# Patient Record
Sex: MALE | NOT HISPANIC OR LATINO | Employment: FULL TIME | URBAN - METROPOLITAN AREA
[De-identification: names, ages, dates, MRNs, and addresses within clinical notes are randomized per-mention and may not be internally consistent; named-entity substitution may affect disease eponyms.]

---

## 2017-02-05 DIAGNOSIS — E03.4 HYPOTHYROIDISM DUE TO ACQUIRED ATROPHY OF THYROID: Primary | ICD-10-CM

## 2017-02-06 NOTE — TELEPHONE ENCOUNTER
Levothyroxine     Last Written Prescription Date: 11/15/16  Last Quantity: 90, # refills: 1  Last Office Visit with G, P or [facility] prescribing provider: 11/15/16        TSH   Date Value Ref Range Status   11/15/2016 5.18* 0.40 - 4.00 mU/L Final       Sandy Mcbride CMA

## 2017-02-07 RX ORDER — LEVOTHYROXINE SODIUM 112 UG/1
TABLET ORAL
Qty: 90 TABLET | Refills: 1 | OUTPATIENT
Start: 2017-02-07

## 2017-02-07 NOTE — TELEPHONE ENCOUNTER
Routing refill request to provider for review/approval because:  Labs out of range:      Isa Greenwood RN  [facility]  [phone]

## 2017-02-07 NOTE — TELEPHONE ENCOUNTER
Levothyroxine     Last Written Prescription Date: 11/15/16  Last Quantity: 90, # refills: 1      Why does he need more medication ? Let me know.    Demetrio Lawson MD  [facility], [place]

## 2017-03-20 ENCOUNTER — ONCOLOGY VISIT (OUTPATIENT)
Dept: ONCOLOGY | Facility: CLINIC | Age: 67
End: 2017-03-20
Attending: INTERNAL MEDICINE
Payer: COMMERCIAL

## 2017-03-20 ENCOUNTER — APPOINTMENT (OUTPATIENT)
Dept: LAB | Facility: CLINIC | Age: 67
End: 2017-03-20
Attending: INTERNAL MEDICINE
Payer: COMMERCIAL

## 2017-03-20 VITALS
BODY MASS INDEX: 29.12 KG/M2 | SYSTOLIC BLOOD PRESSURE: 178 MMHG | OXYGEN SATURATION: 97 % | WEIGHT: 214.7 LBS | HEART RATE: 70 BPM | TEMPERATURE: 97.8 F | DIASTOLIC BLOOD PRESSURE: 99 MMHG | RESPIRATION RATE: 16 BRPM

## 2017-03-20 DIAGNOSIS — C83.07 SPLENIC MARGINAL ZONE B-CELL LYMPHOMA (H): ICD-10-CM

## 2017-03-20 LAB
ALBUMIN SERPL-MCNC: 3.8 G/DL (ref 3.4–5)
ALP SERPL-CCNC: 53 U/L (ref 40–150)
ALT SERPL W P-5'-P-CCNC: 23 U/L (ref 0–70)
ANION GAP SERPL CALCULATED.3IONS-SCNC: 6 MMOL/L (ref 3–14)
AST SERPL W P-5'-P-CCNC: 15 U/L (ref 0–45)
BASOPHILS # BLD AUTO: 0 10E9/L (ref 0–0.2)
BASOPHILS NFR BLD AUTO: 0.5 %
BILIRUB SERPL-MCNC: 0.6 MG/DL (ref 0.2–1.3)
BUN SERPL-MCNC: 14 MG/DL (ref 7–30)
CALCIUM SERPL-MCNC: 8.4 MG/DL (ref 8.5–10.1)
CHLORIDE SERPL-SCNC: 109 MMOL/L (ref 94–109)
CO2 SERPL-SCNC: 28 MMOL/L (ref 20–32)
CREAT SERPL-MCNC: 0.86 MG/DL (ref 0.66–1.25)
DIFFERENTIAL METHOD BLD: ABNORMAL
EOSINOPHIL # BLD AUTO: 0.1 10E9/L (ref 0–0.7)
EOSINOPHIL NFR BLD AUTO: 1.6 %
ERYTHROCYTE [DISTWIDTH] IN BLOOD BY AUTOMATED COUNT: 13 % (ref 10–15)
GFR SERPL CREATININE-BSD FRML MDRD: 89 ML/MIN/1.7M2
GLUCOSE SERPL-MCNC: 115 MG/DL (ref 70–99)
HCT VFR BLD AUTO: 43.6 % (ref 40–53)
HGB BLD-MCNC: 14.6 G/DL (ref 13.3–17.7)
IMM GRANULOCYTES # BLD: 0 10E9/L (ref 0–0.4)
IMM GRANULOCYTES NFR BLD: 0.2 %
LDH SERPL L TO P-CCNC: 192 U/L (ref 85–227)
LYMPHOCYTES # BLD AUTO: 1.3 10E9/L (ref 0.8–5.3)
LYMPHOCYTES NFR BLD AUTO: 21.8 %
MCH RBC QN AUTO: 33.7 PG (ref 26.5–33)
MCHC RBC AUTO-ENTMCNC: 33.5 G/DL (ref 31.5–36.5)
MCV RBC AUTO: 101 FL (ref 78–100)
MONOCYTES # BLD AUTO: 0.6 10E9/L (ref 0–1.3)
MONOCYTES NFR BLD AUTO: 9.9 %
NEUTROPHILS # BLD AUTO: 3.8 10E9/L (ref 1.6–8.3)
NEUTROPHILS NFR BLD AUTO: 66 %
NRBC # BLD AUTO: 0 10*3/UL
NRBC BLD AUTO-RTO: 0 /100
PLATELET # BLD AUTO: 246 10E9/L (ref 150–450)
POTASSIUM SERPL-SCNC: 4.6 MMOL/L (ref 3.4–5.3)
PROT SERPL-MCNC: 7.1 G/DL (ref 6.8–8.8)
RBC # BLD AUTO: 4.33 10E12/L (ref 4.4–5.9)
SODIUM SERPL-SCNC: 143 MMOL/L (ref 133–144)
WBC # BLD AUTO: 5.7 10E9/L (ref 4–11)

## 2017-03-20 PROCEDURE — 80053 COMPREHEN METABOLIC PANEL: CPT | Performed by: INTERNAL MEDICINE

## 2017-03-20 PROCEDURE — 99213 OFFICE O/P EST LOW 20 MIN: CPT | Mod: ZP | Performed by: INTERNAL MEDICINE

## 2017-03-20 PROCEDURE — 36415 COLL VENOUS BLD VENIPUNCTURE: CPT

## 2017-03-20 PROCEDURE — 99212 OFFICE O/P EST SF 10 MIN: CPT | Mod: ZF

## 2017-03-20 PROCEDURE — 83615 LACTATE (LD) (LDH) ENZYME: CPT | Performed by: INTERNAL MEDICINE

## 2017-03-20 PROCEDURE — 85025 COMPLETE CBC W/AUTO DIFF WBC: CPT | Performed by: INTERNAL MEDICINE

## 2017-03-20 NOTE — MR AVS SNAPSHOT
After Visit Summary   3/20/2017    Moose Serrano    MRN: [id number]           Patient Information     Date Of Birth          1950        Visit Information        Provider Department      3/20/2017 8:30 AM Carlos Beebe MD [facility]        Today's Diagnoses     Splenic marginal zone b-cell lymphoma (H)           Follow-ups after your visit        Your next 10 appointments already scheduled     Sep 18, 2017  8:00 AM CDT   [facility] with  [facility]   [facility] ([facility])    [address] [place] [id number] [phone]            Sep 18, 2017  8:30 AM CDT   (Arrive by 8:15 AM)   Return Visit with Carlos Beebe MD   [facility] ([facility])    [address] [place] [address] [id number] [phone]              Future tests that were ordered for you today     Open Future Orders        Priority Expected Expires Ordered    CBC with platelets differential Routine 9/21/2017 3/21/2018 3/21/2017    Comprehensive metabolic panel Routine 9/21/2017 3/21/2018 3/21/2017    Lactate Dehydrogenase Routine 9/21/2017 3/21/2018 3/21/2017            Who to contact     If you have questions or need follow up information about today's clinic visit or your schedule please contact [facility] directly at [phone].  Normal or non-critical lab and imaging results will be communicated to you by MyChart, letter or phone within 4 business days after the clinic has received the results. If you do not hear from us within 7 days, please contact the clinic through MyChart or phone. If you have a critical or abnormal lab result, we will notify you by phone as soon as possible.  Submit refill requests through Eved or call your pharmacy and they will forward the refill request to us. Please allow 3 business days for your  "refill to be completed.          Additional Information About Your Visit        Nugg-it Information     Nugg-it lets you send messages to your doctor, view your test results, renew your prescriptions, schedule appointments and more. To sign up, go to [email or web address] . Click on \"Log in\" on the left side of the screen, which will take you to the Welcome page. Then click on \"Sign up Now\" on the right side of the page.     You will be asked to enter the access code listed below, as well as some personal information. Please follow the directions to create your username and password.     Your access code is: [email or web address]  Expires: 2017  7:30 AM     Your access code will  in 90 days. If you need help or a new code, please call your [facility] clinic or [phone].        Care EveryWhere ID     This is your [facility] EveryWhere ID. This could be used by other organizations to access your [facility] medical records  [id number]        Your Vitals Were     Pulse Temperature Respirations Pulse Oximetry BMI (Body Mass Index)       70 97.8  F (36.6  C) (Oral) 16 97% 29.12 kg/m2        Blood Pressure from Last 3 Encounters:   17 (!) 178/99   11/15/16 128/88   16 (!) 179/97    Weight from Last 3 Encounters:   17 97.4 kg (214 lb 11.2 oz)   11/15/16 96.2 kg (212 lb)   16 99.8 kg (220 lb 0.3 oz)              We Performed the Following     CBC with platelets differential     Comprehensive metabolic panel     Lactate Dehydrogenase        Primary Care Provider    None       No address on file        Thank you!     Thank you for choosing [facility]  for your care. Our goal is always to provide you with excellent care. Hearing back from our patients is one way we can continue to improve our services. Please take a few minutes to complete the written survey that you may receive in the mail after your visit with us. Thank you!             Your Updated Medication List - Protect others " around you: Learn how to safely use, store and throw away your medicines at www.disposemymeds.org.          This list is accurate as of: 3/20/17 11:59 PM.  Always use your most recent med list.                   Brand Name Dispense Instructions for use    azithromycin 250 MG tablet    ZITHROMAX    6 tablet    Two tablets first day, then one tablet daily for four days.       levothyroxine 112 MCG tablet    SYNTHROID/LEVOTHROID    90 tablet    Take 1 tablet (112 mcg) by mouth daily       Multi-vitamin Tabs tablet      Take 1 tablet by mouth daily

## 2017-03-20 NOTE — NURSING NOTE
Chief Complaint   Patient presents with     Blood Draw     venipuncture     Vitals done and labs drawn, see flow sheets.  OLIVER BEAN, CMA

## 2017-03-20 NOTE — NURSING NOTE
Moose Serrano is a 66 year old male who presents for:  Chief Complaint   Patient presents with     Blood Draw     venipuncture     Oncology Clinic Visit     Splenic marginal zone b-cell lymphoma (H)        Initial Vitals:  BP (!) 178/99  Pulse 70  Temp 97.8  F (36.6  C) (Oral)  Resp 16  Wt 97.4 kg (214 lb 11.2 oz)  SpO2 97%  BMI 29.12 kg/m2 Estimated body mass index is 29.12 kg/(m^2) as calculated from the following:    Height as of 11/15/16: 1.829 m (6').    Weight as of this encounter: 97.4 kg (214 lb 11.2 oz).. Body surface area is 2.22 meters squared. BP completed using cuff size: regular  Data Unavailable No LMP for male patient. Allergies and medications reviewed.     Medications: Medication refills not needed today.  Pharmacy name entered into EPIC:    [facility] [place] [address] [facility] [facility] - [place], [place] - [address]    Comments:     6 minutes for nursing intake (face to face time)   Arlyn Rice CMA

## 2017-03-20 NOTE — PROGRESS NOTES
REASON FOR VISIT:  Follow up splenic marginal zone lymphoma.       HISTORY OF PRESENT ILLNESS:  Mr. 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   To summarize his course, he was noted to have progressive leukocytosis on routine blood work in mid 2013.  PET/CT revealed splenomegaly and lymphadenopathy in the axilla, pretracheal area, and portacaval area.  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  He was initially monitored without treatment.  After developing abdominal pain, weight loss, and night sweats he was started on rituximab in 7/2014.  He had difficulty tolerating rituximab due to severe infusion reaction, so he was treated with CVP alone and then rituximab was successfully added.  He completed 6 cycles of treatment at the end of 10/2014.  He is here for ongoing follow up.    Since his last visit, there have been no new issues.  He continues to feel well and remains very busy with his work.  Currently he is in [facility] for work.  He has had no fevers, chills or night sweats.  Appetite is decent.  Weight is stable.  He has no chest pain, difficulty with breathing or cough.  He has normal bowel function.  He has no urinary complaints.  He has no bleeding, bruising or skin rashes.  He has no headache, vision changes, focal weakness or sensory changes.  Overall, he is managing well.      REVIEW OF SYSTEMS:  A complete 10-point review of systems was negative other than noted.    MEDICATIONS:  Current Outpatient Prescriptions   Medication     azithromycin (ZITHROMAX) 250 MG tablet     levothyroxine (SYNTHROID, LEVOTHROID) 112 MCG tablet     multivitamin, therapeutic with minerals (MULTI-VITAMIN) TABS     [DISCONTINUED] NO ACTIVE MEDICATIONS     No current facility-administered medications for this visit.      PHYSICAL EXAMINATION:  BP (!)  178/99  Pulse 70  Temp 97.8  F (36.6  C) (Oral)  Resp 16  Wt 97.4 kg (214 lb 11.2 oz)  SpO2 97%  BMI 29.12 kg/m2  Wt Readings from Last 5 Encounters:   03/20/17 97.4 kg (214 lb 11.2 oz)   11/15/16 96.2 kg (212 lb)   08/29/16 99.8 kg (220 lb 0.3 oz)   05/16/16 99.5 kg (219 lb 4.8 oz)   02/15/16 101.1 kg (222 lb 14.4 oz)     General: Well appearing white male. No acute distress.  HEENT: Sclerae anicteric. No OP lesions, masses, or tonsilar enlargement.  Lungs: Clear bilaterally without wheezing or crackles.  Heart: Regular rate and rhythm without murmurs.  Abd: Bowel sounds present, no tenderness to palpation, spleen tip not palpable.  Extremities: No lower extremity edema.  Lymph:  No cervical, clavicular, axillary, epitrochlear, or inguinal lymphadenopathy.  Neuro: Grossly non-focal.  Performance status: ECOG 0  Skin:  Soft mobile subq lesions on R clavicle and forehead stable    LABORATORY DATA:  Recent Labs   Lab Test  03/20/17   0803  08/29/16   1018  05/16/16   0752   02/24/14   0826   11/25/13   1009  07/24/13   0755   WBC  5.7  5.9  5.0   < >  13.0*   < >  26.7*  15.9*   HGB  14.6  14.0  14.6   < >  12.7*   < >  14.2  13.9   PLT  246  229  279   < >  212   < >  245  227   ANEU  3.8  3.9  3.0   < >  4.3   < >  6.9  5.9   ALYM  1.3  1.3  1.1   < >  7.0*   < >  17.7*  7.5*   RETICABSCT   --    --    --    --   104.4*   --   80.0  59.2    < > = values in this interval not displayed.     Recent Labs   Lab Test  03/20/17   0803  08/29/16   1018  05/16/16   0752   05/18/15   0711   11/17/14   1204  10/27/14   1037   07/18/14   0906   NA  143  142  140   < >  140   < >  141  139   < >  142   POTASSIUM  4.6  4.6  4.8   < >  4.9   < >  4.0  4.3   < >  4.5   CHLORIDE  109  108  105   < >  106   < >  107  107   < >  102   CO2  28  28  29   < >  27   < >  24  26   < >  29   BUN  14  13  10   < >  13   < >  8  13   < >  16   CR  0.86  1.00  1.07   < >  0.89   < >  1.05  0.94   < >  0.89   RACHEAL  8.4*  8.6  8.5   < >  8.6   <  >  9.1  9.5   < >  9.0   PHOS   --    --    --    --    --    --    --    --    --   2.9   URIC   --    --    --    --   6.1   --   6.3  5.8   < >  5.0   LDH  192  172  202   < >  194   < >  183  218   < >  464    < > = values in this interval not displayed.     Recent Labs   Lab Test  03/20/17   0803  08/29/16   1018  05/16/16   0752   AST  15  9  22   ALT  23  21  34   ALKPHOS  53  57  68   BILITOTAL  0.6  0.6  0.7     IMPRESSION AND PLAN:  Mr. 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      In regards to his lymphoma, he clinically remains in remission.  Blood counts look great today.  We discussed plans for ongoing monitoring at 6-month intervals and the importance that he let us know if there is anything that comes up in between visits.       He has no active infectious issues.  He is up-to-date for pneumococcal and influenza vaccinations.  He will be due for a repeat Pneumovax in 2020.       He is now working with Dr. Lawson for his routine medical issues.  Notably, he continues to be hypertensive when he is at our clinic and relates it to stress of visit (and need to rush to the airport immediately after our visit).  Indeed, at his last visit in his primary care clinic his blood pressure was normal and so we will not make any changes, but will send this note to Dr. Lawson.       I plan to see him back in 6 months with labs.  I reminded him call in the interim if questions, concerns or new issues arise.     Carlos Beebe MD, PhD   of Medicine  Division of Hematology, Oncology, and Transplantation  [email or web address] email  [phone] office  [phone] pager

## 2017-05-03 DIAGNOSIS — E03.4 HYPOTHYROIDISM DUE TO ACQUIRED ATROPHY OF THYROID: ICD-10-CM

## 2017-05-03 NOTE — TELEPHONE ENCOUNTER
Routing refill request to provider for review/approval because:  Labs out of range:  tsh    Isa Greenwood,RN  [facility]  [phone]

## 2017-05-03 NOTE — TELEPHONE ENCOUNTER
levothyroxine (SYNTHROID/LEVOTHROID) 112 MCG tablet     Last Written Prescription Date: 11/15/2016  Last Quantity: 90, # refills: 1  Last Office Visit with [facility], [facility] or [facility] prescribing provider: 11/15/2016        TSH   Date Value Ref Range Status   11/15/2016 5.18 (H) 0.40 - 4.00 mU/L Final

## 2017-05-04 RX ORDER — LEVOTHYROXINE SODIUM 112 UG/1
TABLET ORAL
Qty: 90 TABLET | Refills: 0 | Status: SHIPPED | OUTPATIENT
Start: 2017-05-04 | End: 2017-06-20

## 2017-05-04 NOTE — TELEPHONE ENCOUNTER
"TC called and patient is in [place], should be back Monday  Will call to schedule a \"Lab Only\" appointment to chest TSH  Antonia Perdomo TC  "

## 2017-05-04 NOTE — TELEPHONE ENCOUNTER
Refill ordered.   Future TSH lab ordered. Notify patient to get it checked soon.    Demetrio Lawson MD  [facility], [place]

## 2017-06-14 DIAGNOSIS — E03.4 HYPOTHYROIDISM DUE TO ACQUIRED ATROPHY OF THYROID: ICD-10-CM

## 2017-06-14 LAB — TSH SERPL DL<=0.05 MIU/L-ACNC: 2.53 MU/L (ref 0.4–4)

## 2017-06-14 PROCEDURE — 36415 COLL VENOUS BLD VENIPUNCTURE: CPT | Performed by: FAMILY MEDICINE

## 2017-06-14 PROCEDURE — 84443 ASSAY THYROID STIM HORMONE: CPT | Performed by: FAMILY MEDICINE

## 2017-06-20 ENCOUNTER — OFFICE VISIT (OUTPATIENT)
Dept: FAMILY MEDICINE | Facility: CLINIC | Age: 67
End: 2017-06-20
Payer: COMMERCIAL

## 2017-06-20 VITALS
HEIGHT: 72 IN | SYSTOLIC BLOOD PRESSURE: 143 MMHG | DIASTOLIC BLOOD PRESSURE: 83 MMHG | TEMPERATURE: 98.4 F | OXYGEN SATURATION: 96 % | BODY MASS INDEX: 29.53 KG/M2 | WEIGHT: 218 LBS | HEART RATE: 92 BPM

## 2017-06-20 DIAGNOSIS — E03.4 HYPOTHYROIDISM DUE TO ACQUIRED ATROPHY OF THYROID: Primary | ICD-10-CM

## 2017-06-20 DIAGNOSIS — I10 HTN, GOAL BELOW 140/90: ICD-10-CM

## 2017-06-20 PROCEDURE — 99214 OFFICE O/P EST MOD 30 MIN: CPT | Performed by: FAMILY MEDICINE

## 2017-06-20 RX ORDER — AMLODIPINE BESYLATE 5 MG/1
5 TABLET ORAL DAILY
Qty: 30 TABLET | Refills: 3 | Status: SHIPPED | OUTPATIENT
Start: 2017-06-20 | End: 2017-08-01

## 2017-06-20 RX ORDER — LEVOTHYROXINE SODIUM 112 UG/1
TABLET ORAL
Qty: 90 TABLET | Refills: 3 | Status: SHIPPED | OUTPATIENT
Start: 2017-06-20 | End: 2018-08-03

## 2017-06-20 NOTE — PROGRESS NOTES
SUBJECTIVE:                                                    Moose Serrano is a 66 year old male who presents to clinic today for the following health issues:      Hypothyroidism Follow-up      Since last visit, patient describes the following symptoms: Weight stable, no hair loss, no skin changes, no constipation, no loose stools       Amount of exercise or physical activity: 6-7 days/week for an average of greater than 60 minutes    Problems taking medications regularly: No    Medication side effects: Joint pain from generic doses     Diet: regular (no restrictions)          Patient is noted to have elevated blood pressure today. Patient reports that he has had elevated blood pressure for a long time off and on. Never had been symptomatic therefore never started a medication. Denies any chest pain or pressure. Denies any shortness of breath or palpitations or leg swelling. Denies headaches or visual changes.    Problem list and histories reviewed & adjusted, as indicated.  Additional history: as documented    Patient Active Problem List   Diagnosis     CARDIOVASCULAR SCREENING; LDL GOAL LESS THAN 160     Splenic marginal zone b-cell lymphoma (H)     Hypothyroidism due to acquired atrophy of thyroid     HTN, goal below 140/90     Past Surgical History:   Procedure Laterality Date     COLONOSCOPY  7/30/2013    Procedure: COLONOSCOPY;  Colonoscopy;  Surgeon: Dieudonne Harden MD;  Location:  GI     FINGER SURGERY  1996     right knee surgery  Approx 2002     VASECTOMY       WRIST SURGERY  1996       Social History   Substance Use Topics     Smoking status: Former Smoker     Packs/day: 0.50     Years: 34.00     Types: Cigarettes     Smokeless tobacco: Current User     Types: Chew      Comment: 1 can every 3-4 weeks     Alcohol use Yes      Comment: 2 beers per day     Family History   Problem Relation Age of Onset     Unknown/Adopted No family hx of      Asthma No family hx of      C.A.D. No family hx of       DIABETES No family hx of      Hypertension No family hx of      CEREBROVASCULAR DISEASE No family hx of      Breast Cancer No family hx of      Cancer - colorectal No family hx of      Prostate Cancer No family hx of      Alcohol/Drug No family hx of      Allergies No family hx of      Alzheimer Disease No family hx of      Anesthesia Reaction No family hx of      Arthritis No family hx of      Blood Disease No family hx of      CANCER No family hx of      Cardiovascular No family hx of      Circulatory No family hx of      Congenital Anomalies No family hx of      Connective Tissue Disorder No family hx of      Depression No family hx of      Endocrine Disease No family hx of      Eye Disorder No family hx of      Genetic Disorder No family hx of      GASTROINTESTINAL DISEASE No family hx of      Genitourinary Problems No family hx of      Gynecology No family hx of      HEART DISEASE No family hx of      Lipids No family hx of      Musculoskeletal Disorder No family hx of      Neurologic Disorder No family hx of      Obesity No family hx of      OSTEOPOROSIS No family hx of      Psychotic Disorder No family hx of      Respiratory No family hx of      Thyroid Disease No family hx of      Family History Negative No family hx of      Hearing Loss No family hx of          Current Outpatient Prescriptions   Medication Sig Dispense Refill     levothyroxine (SYNTHROID/LEVOTHROID) 112 MCG tablet TAKE 1 TABLET (112 MCG) BY MOUTH DAILY 90 tablet 3     amLODIPine (NORVASC) 5 MG tablet Take 1 tablet (5 mg) by mouth daily 30 tablet 3     multivitamin, therapeutic with minerals (MULTI-VITAMIN) TABS Take 1 tablet by mouth daily       [DISCONTINUED] levothyroxine (SYNTHROID/LEVOTHROID) 112 MCG tablet TAKE 1 TABLET (112 MCG) BY MOUTH DAILY 90 tablet 0     [DISCONTINUED] NO ACTIVE MEDICATIONS        Allergies   Allergen Reactions     Penicillins Hives     Rituximab Other (See Comments)     Rigors, nausea       Reviewed and updated  as needed this visit by clinical staff       Reviewed and updated as needed this visit by Provider         ROS:  C: NEGATIVE for fever, chills, change in weight  E/M: NEGATIVE for ear, mouth and throat problems  R: NEGATIVE for significant cough or SOB  CV: NEGATIVE for chest pain, palpitations or peripheral edema    OBJECTIVE:                                                    /83  Pulse 92  Temp 98.4  F (36.9  C) (Tympanic)  Ht 6' (1.829 m)  Wt 218 lb (98.9 kg)  SpO2 96%  BMI 29.57 kg/m2  Body mass index is 29.57 kg/(m^2).   GENERAL: healthy, alert, well nourished, well hydrated, no distress  HENT: ear canals- normal; TMs- normal; Nose- normal; Mouth- no ulcers, no lesions  NECK: no tenderness, no adenopathy, no asymmetry, no masses, no stiffness; thyroid- normal to palpation  RESP: lungs clear to auscultation - no rales, no rhonchi, no wheezes  CV: regular rates and rhythm, normal S1 S2, no S3 or S4 and no murmur, no click or rub -  ABDOMEN: soft, no tenderness, no  hepatosplenomegaly, no masses, normal bowel sounds  MS: extremities- no gross deformities noted, no edema    TSH   Date Value Ref Range Status   06/14/2017 2.53 0.40 - 4.00 mU/L Final   ]       ASSESSMENT/PLAN:                                                      1. Hypothyroidism due to acquired atrophy of thyroid  Well-controlled. Refill on medication given. Resume current dose  - levothyroxine (SYNTHROID/LEVOTHROID) 112 MCG tablet; TAKE 1 TABLET (112 MCG) BY MOUTH DAILY  Dispense: 90 tablet; Refill: 3    2. HTN, goal below 140/90  New diagnosis. Recommended to get it treated as elevated blood pressure may lead to cardiac hypertrophy and failure. Patient is in agreement to starting a medication  Recommended to start amlodipine 5 mg daily. Side effect profile and mechanism of action was discussed. Follow-up in one month for recheck.  - amLODIPine (NORVASC) 5 MG tablet; Take 1 tablet (5 mg) by mouth daily  Dispense: 30 tablet; Refill:  3      Follow up with Provider - 1 month     Demetrio Lawson MD  [facility]

## 2017-06-20 NOTE — NURSING NOTE
Chief Complaint   Patient presents with     Thyroid Problem     follow up        Initial /83  Pulse 92  Temp 98.4  F (36.9  C) (Tympanic)  Ht 6' (1.829 m)  Wt 218 lb (98.9 kg)  SpO2 96%  BMI 29.57 kg/m2 Estimated body mass index is 29.57 kg/(m^2) as calculated from the following:    Height as of this encounter: 6' (1.829 m).    Weight as of this encounter: 218 lb (98.9 kg).  Medication Reconciliation: complete

## 2017-06-20 NOTE — MR AVS SNAPSHOT
After Visit Summary   6/20/2017    Moose Serrano    MRN: [id number]           Patient Information     Date Of Birth          1950        Visit Information        Provider Department      6/20/2017 10:00 AM Demetrio Lawson MD [facility] [place]        Today's Diagnoses     HTN, goal below 140/90    -  1    Hypothyroidism due to acquired atrophy of thyroid           Follow-ups after your visit        Follow-up notes from your care team     Return in about 4 weeks (around 7/18/2017) for Physical Exam, Lab Work, BP Recheck.      Your next 10 appointments already scheduled     Sep 18, 2017  8:00 AM CDT   Masonic Lab Draw with  MASONIC LAB DRAW   [facility] ([facility])    [address] [place] [id number] [phone]            Sep 18, 2017  8:30 AM CDT   (Arrive by 8:15 AM)   Return Visit with Carlos Beebe MD   [facility] ([facility])    [address] [place] [id number] [phone]              Who to contact     If you have questions or need follow up information about today's clinic visit or your schedule please contact [facility] [place] directly at [phone].  Normal or non-critical lab and imaging results will be communicated to you by [employer], letter or phone within 4 business days after the clinic has received the results. If you do not hear from us within 7 days, please contact the clinic through MyChart or phone. If you have a critical or abnormal lab result, we will notify you by phone as soon as possible.  Submit refill requests through [email or web address] or call your pharmacy and they will forward the refill request to us. Please allow 3 business days for your refill to be completed.          Additional Information About Your Visit        [email or web address] Information     [email or web address] lets you send messages to your doctor, view your test  "results, renew your prescriptions, schedule appointments and more. To sign up, go to [email or web address] . Click on \"Log in\" on the left side of the screen, which will take you to the Welcome page. Then click on \"Sign up Now\" on the right side of the page.     You will be asked to enter the access code listed below, as well as some personal information. Please follow the directions to create your username and password.     Your access code is: [email or web address]  Expires: 2017 10:24 AM     Your access code will  in 90 days. If you need help or a new code, please call your [place] clinic or [phone].        Care EveryWhere ID     This is your Care EveryWhere ID. This could be used by other organizations to access your [place] medical records  [id number]        Your Vitals Were     Pulse Temperature Height Pulse Oximetry BMI (Body Mass Index)       92 98.4  F (36.9  C) (Tympanic) 6' (1.829 m) 96% 29.57 kg/m2        Blood Pressure from Last 3 Encounters:   17 143/83   17 (!) 178/99   11/15/16 128/88    Weight from Last 3 Encounters:   17 218 lb (98.9 kg)   17 214 lb 11.2 oz (97.4 kg)   11/15/16 212 lb (96.2 kg)              Today, you had the following     No orders found for display         Today's Medication Changes          These changes are accurate as of: 17 10:24 AM.  If you have any questions, ask your nurse or doctor.               Start taking these medicines.        Dose/Directions    amLODIPine 5 MG tablet   Commonly known as:  NORVASC   Used for:  HTN, goal below 140/90   Started by:  Demetrio Lawson MD        Dose:  5 mg   Take 1 tablet (5 mg) by mouth daily   Quantity:  30 tablet   Refills:  3         These medicines have changed or have updated prescriptions.        Dose/Directions    levothyroxine 112 MCG tablet   Commonly known as:  SYNTHROID/LEVOTHROID   This may have changed:  See the new instructions.   Used for:  Hypothyroidism due to acquired atrophy of " thyroid   Changed by:  Demetrio Lawson MD        TAKE 1 TABLET (112 MCG) BY MOUTH DAILY   Quantity:  90 tablet   Refills:  3            Where to get your medicines      [place] [facility] [place] [address]  [address], [place] [address]     Phone:  [phone]     amLODIPine 5 MG tablet    levothyroxine 112 MCG tablet                Primary Care Provider    None       No address on file        Thank you!     Thank you for choosing [facility]  for your care. Our goal is always to provide you with excellent care. Hearing back from our patients is one way we can continue to improve our services. Please take a few minutes to complete the written survey that you may receive in the mail after your visit with us. Thank you!             Your Updated Medication List - Protect others around you: Learn how to safely use, store and throw away your medicines at www.disposemymeds.org.          This list is accurate as of: 6/20/17 10:24 AM.  Always use your most recent med list.                   Brand Name Dispense Instructions for use    amLODIPine 5 MG tablet    NORVASC    30 tablet    Take 1 tablet (5 mg) by mouth daily       levothyroxine 112 MCG tablet    SYNTHROID/LEVOTHROID    90 tablet    TAKE 1 TABLET (112 MCG) BY MOUTH DAILY       Multi-vitamin Tabs tablet      Take 1 tablet by mouth daily

## 2017-08-01 ENCOUNTER — OFFICE VISIT (OUTPATIENT)
Dept: FAMILY MEDICINE | Facility: CLINIC | Age: 67
End: 2017-08-01
Payer: COMMERCIAL

## 2017-08-01 VITALS
HEIGHT: 72 IN | WEIGHT: 218 LBS | DIASTOLIC BLOOD PRESSURE: 82 MMHG | SYSTOLIC BLOOD PRESSURE: 134 MMHG | OXYGEN SATURATION: 95 % | TEMPERATURE: 98 F | HEART RATE: 80 BPM | BODY MASS INDEX: 29.53 KG/M2

## 2017-08-01 DIAGNOSIS — I10 HTN, GOAL BELOW 140/90: ICD-10-CM

## 2017-08-01 PROCEDURE — 99213 OFFICE O/P EST LOW 20 MIN: CPT | Performed by: FAMILY MEDICINE

## 2017-08-01 RX ORDER — AMLODIPINE BESYLATE 5 MG/1
5 TABLET ORAL DAILY
Qty: 90 TABLET | Refills: 3 | Status: SHIPPED | OUTPATIENT
Start: 2017-08-01 | End: 2018-08-14

## 2017-08-01 NOTE — PROGRESS NOTES
SUBJECTIVE:                                                    Moose Serrano is a 66 year old male who presents to clinic today for the following health issues:      Hypertension Follow-up      Outpatient blood pressures are not being checked.    Low Salt Diet: no added salt        Amount of exercise or physical activity: None    Problems taking medications regularly: No    Medication side effects: none  Diet: regular (no restrictions)        Problem list and histories reviewed & adjusted, as indicated.  Additional history: as documented    Patient Active Problem List   Diagnosis     CARDIOVASCULAR SCREENING; LDL GOAL LESS THAN 160     Splenic marginal zone b-cell lymphoma (H)     Hypothyroidism due to acquired atrophy of thyroid     HTN, goal below 140/90     Past Surgical History:   Procedure Laterality Date     COLONOSCOPY  7/30/2013    Procedure: COLONOSCOPY;  Colonoscopy;  Surgeon: Dieudonne Harden MD;  Location:  GI     FINGER SURGERY  1996     right knee surgery  Approx 2002     VASECTOMY       WRIST SURGERY  1996       Social History   Substance Use Topics     Smoking status: Former Smoker     Packs/day: 0.50     Years: 34.00     Types: Cigarettes     Smokeless tobacco: Current User     Types: Chew      Comment: 1 can every 3-4 weeks     Alcohol use Yes      Comment: 2 beers per day     Family History   Problem Relation Age of Onset     Unknown/Adopted No family hx of      Asthma No family hx of      C.A.D. No family hx of      DIABETES No family hx of      Hypertension No family hx of      CEREBROVASCULAR DISEASE No family hx of      Breast Cancer No family hx of      Cancer - colorectal No family hx of      Prostate Cancer No family hx of      Alcohol/Drug No family hx of      Allergies No family hx of      Alzheimer Disease No family hx of      Anesthesia Reaction No family hx of      Arthritis No family hx of      Blood Disease No family hx of      CANCER No family hx of      Cardiovascular No family  hx of      Circulatory No family hx of      Congenital Anomalies No family hx of      Connective Tissue Disorder No family hx of      Depression No family hx of      Endocrine Disease No family hx of      Eye Disorder No family hx of      Genetic Disorder No family hx of      GASTROINTESTINAL DISEASE No family hx of      Genitourinary Problems No family hx of      Gynecology No family hx of      HEART DISEASE No family hx of      Lipids No family hx of      Musculoskeletal Disorder No family hx of      Neurologic Disorder No family hx of      Obesity No family hx of      OSTEOPOROSIS No family hx of      Psychotic Disorder No family hx of      Respiratory No family hx of      Thyroid Disease No family hx of      Family History Negative No family hx of      Hearing Loss No family hx of          Current Outpatient Prescriptions   Medication Sig Dispense Refill     amLODIPine (NORVASC) 5 MG tablet Take 1 tablet (5 mg) by mouth daily 90 tablet 3     levothyroxine (SYNTHROID/LEVOTHROID) 112 MCG tablet TAKE 1 TABLET (112 MCG) BY MOUTH DAILY 90 tablet 3     multivitamin, therapeutic with minerals (MULTI-VITAMIN) TABS Take 1 tablet by mouth daily       [DISCONTINUED] amLODIPine (NORVASC) 5 MG tablet Take 1 tablet (5 mg) by mouth daily 30 tablet 3     [DISCONTINUED] NO ACTIVE MEDICATIONS        Allergies   Allergen Reactions     Penicillins Hives     Rituximab Other (See Comments)     Rigors, nausea         Reviewed and updated as needed this visit by clinical staff     Reviewed and updated as needed this visit by Provider         ROS:  C: NEGATIVE for fever, chills, change in weight  E/M: NEGATIVE for ear, mouth and throat problems  R: NEGATIVE for significant cough or SOB  CV: NEGATIVE for chest pain, palpitations or peripheral edema    OBJECTIVE:                                                    /82 (BP Location: Right arm, Patient Position: Chair, Cuff Size: Adult Large)  Pulse 80  Temp 98  F (36.7  C)  (Tympanic)  Ht 6' (1.829 m)  Wt 218 lb (98.9 kg)  SpO2 95%  BMI 29.57 kg/m2  Body mass index is 29.57 kg/(m^2).   GENERAL: healthy, alert, well nourished, well hydrated, no distress  HENT: ear canals- normal; TMs- normal; Nose- normal; Mouth- no ulcers, no lesions  NECK: no tenderness, no adenopathy, no asymmetry, no masses, no stiffness; thyroid- normal to palpation  RESP: lungs clear to auscultation - no rales, no rhonchi, no wheezes  CV: regular rates and rhythm, normal S1 S2, no S3 or S4 and no murmur, no click or rub -  ABDOMEN: soft, no tenderness, no  hepatosplenomegaly, no masses, normal bowel sounds  MS: extremities- no gross deformities noted, no edema         ASSESSMENT/PLAN:                                                        ICD-10-CM    1. HTN, goal below 140/90 I10 amLODIPine (NORVASC) 5 MG tablet     Lipid panel     Glucose     Well controlled HYPERTENSION. Recommended to resume Amlodipine 5 mg QD. Patient is tolerating it well.   F/u in 6 months for BP check with me.   Fasting labs ordered. Patient would need a lab only visit for that.     Follow up with Provider - 6 months or prn.     Demetrio Lawson MD  [facility]

## 2017-08-01 NOTE — NURSING NOTE
Chief Complaint   Patient presents with     Hypertension       Initial /82 (BP Location: Right arm, Patient Position: Chair, Cuff Size: Adult Large)  Pulse 80  Temp 98  F (36.7  C) (Tympanic)  Ht 6' (1.829 m)  Wt 218 lb (98.9 kg)  SpO2 95%  BMI 29.57 kg/m2 Estimated body mass index is 29.57 kg/(m^2) as calculated from the following:    Height as of this encounter: 6' (1.829 m).    Weight as of this encounter: 218 lb (98.9 kg).  Medication Reconciliation: complete     Sandy Mcbride CMA

## 2017-08-01 NOTE — MR AVS SNAPSHOT
After Visit Summary   8/1/2017    Moose Serrano    MRN: [id number]           Patient Information     Date Of Birth          1950        Visit Information        Provider Department      8/1/2017 11:40 AM Demetrio Lawson MD [facility] [place]        Today's Diagnoses     HTN, goal below 140/90           Follow-ups after your visit        Your next 10 appointments already scheduled     Oct 02, 2017  8:00 AM CDT   Masonic Lab Draw with  [employer] LAB DRAW   [facility]    [address] [place] [id number]   [phone]            Oct 02, 2017  8:30 AM CDT   (Arrive by 8:15 AM)   Return Visit with Carlos Beebe MD   [facility] ([facility])    [address] [place] [id number] [phone]              Future tests that were ordered for you today     Open Future Orders        Priority Expected Expires Ordered    Glucose Routine  3/1/2018 8/1/2017    Lipid panel Routine  3/1/2018 8/1/2017            Who to contact     If you have questions or need follow up information about today's clinic visit or your schedule please contact [facility] [place] directly at [phone].  Normal or non-critical lab and imaging results will be communicated to you by Share[employer]hart, letter or phone within 4 business days after the clinic has received the results. If you do not hear from us within 7 days, please contact the clinic through MyChart or phone. If you have a critical or abnormal lab result, we will notify you by phone as soon as possible.  Submit refill requests through My Single Point or call your pharmacy and they will forward the refill request to us. Please allow 3 business days for your refill to be completed.          Additional Information About Your Visit        My Single Point Information     My Single Point lets you send messages to your doctor, view your  "test results, renew your prescriptions, schedule appointments and more. To sign up, go to [email or web address] . Click on \"Log in\" on the left side of the screen, which will take you to the Welcome page. Then click on \"Sign up Now\" on the right side of the page.     You will be asked to enter the access code listed below, as well as some personal information. Please follow the directions to create your username and password.     Your access code is: [email or web address]  Expires: 2017 10:24 AM     Your access code will  in 90 days. If you need help or a new code, please call your [place] clinic or [phone].        Care EveryWhere ID     This is your Care EveryWhere ID. This could be used by other organizations to access your [place] medical records  [id number]        Your Vitals Were     Pulse Temperature Height Pulse Oximetry BMI (Body Mass Index)       80 98  F (36.7  C) (Tympanic) 6' (1.829 m) 95% 29.57 kg/m2        Blood Pressure from Last 3 Encounters:   17 134/82   17 143/83   17 (!) 178/99    Weight from Last 3 Encounters:   17 218 lb (98.9 kg)   17 218 lb (98.9 kg)   17 214 lb 11.2 oz (97.4 kg)                 Where to get your medicines      These medications were sent to [facility] - [facility], [place] - [address]  [address] [address]     Phone:  [phone]     amLODIPine 5 MG tablet          Primary Care Provider    None       No address on file        Equal Access to Services     MORIAH MENON : Hadii antonio carrasco Sonancy, waaxda luqadaha, qaybta kaalmada adejun, shirley cruz. So [facility] [phone].    ATENCIÓN: Si habla español, tiene a escalante disposición servicios gratuitos de asistencia lingüística. Llame al [phone].    We comply with applicable federal civil rights laws and [place] laws. We do not discriminate on the basis of race, color, national origin, age, disability " sex, sexual orientation or gender identity.            Thank you!     Thank you for choosing [facility] [place]  for your care. Our goal is always to provide you with excellent care. Hearing back from our patients is one way we can continue to improve our services. Please take a few minutes to complete the written survey that you may receive in the mail after your visit with us. Thank you!             Your Updated Medication List - Protect others around you: Learn how to safely use, store and throw away your medicines at www.disposemymeds.org.          This list is accurate as of: 8/1/17 11:56 AM.  Always use your most recent med list.                   Brand Name Dispense Instructions for use Diagnosis    amLODIPine 5 MG tablet    NORVASC    90 tablet    Take 1 tablet (5 mg) by mouth daily    HTN, goal below 140/90       levothyroxine 112 MCG tablet    SYNTHROID/LEVOTHROID    90 tablet    TAKE 1 TABLET (112 MCG) BY MOUTH DAILY    Hypothyroidism due to acquired atrophy of thyroid       Multi-vitamin Tabs tablet      Take 1 tablet by mouth daily

## 2017-10-16 ENCOUNTER — APPOINTMENT (OUTPATIENT)
Dept: LAB | Facility: CLINIC | Age: 67
End: 2017-10-16
Attending: INTERNAL MEDICINE
Payer: COMMERCIAL

## 2017-10-16 ENCOUNTER — ONCOLOGY VISIT (OUTPATIENT)
Dept: ONCOLOGY | Facility: CLINIC | Age: 67
End: 2017-10-16
Attending: INTERNAL MEDICINE
Payer: COMMERCIAL

## 2017-10-16 VITALS
BODY MASS INDEX: 30.57 KG/M2 | DIASTOLIC BLOOD PRESSURE: 107 MMHG | WEIGHT: 225.7 LBS | OXYGEN SATURATION: 99 % | HEART RATE: 79 BPM | SYSTOLIC BLOOD PRESSURE: 182 MMHG | TEMPERATURE: 97.9 F | RESPIRATION RATE: 16 BRPM | HEIGHT: 72 IN

## 2017-10-16 DIAGNOSIS — C83.07 SPLENIC MARGINAL ZONE B-CELL LYMPHOMA (H): ICD-10-CM

## 2017-10-16 LAB
ALBUMIN SERPL-MCNC: 3.9 G/DL (ref 3.4–5)
ALP SERPL-CCNC: 66 U/L (ref 40–150)
ALT SERPL W P-5'-P-CCNC: 27 U/L (ref 0–70)
ANION GAP SERPL CALCULATED.3IONS-SCNC: 5 MMOL/L (ref 3–14)
AST SERPL W P-5'-P-CCNC: 15 U/L (ref 0–45)
BASOPHILS # BLD AUTO: 0.1 10E9/L (ref 0–0.2)
BASOPHILS NFR BLD AUTO: 1 %
BILIRUB SERPL-MCNC: 0.8 MG/DL (ref 0.2–1.3)
BUN SERPL-MCNC: 13 MG/DL (ref 7–30)
CALCIUM SERPL-MCNC: 8.4 MG/DL (ref 8.5–10.1)
CHLORIDE SERPL-SCNC: 106 MMOL/L (ref 94–109)
CO2 SERPL-SCNC: 27 MMOL/L (ref 20–32)
CREAT SERPL-MCNC: 1.04 MG/DL (ref 0.66–1.25)
DIFFERENTIAL METHOD BLD: ABNORMAL
EOSINOPHIL # BLD AUTO: 0.1 10E9/L (ref 0–0.7)
EOSINOPHIL NFR BLD AUTO: 2.1 %
ERYTHROCYTE [DISTWIDTH] IN BLOOD BY AUTOMATED COUNT: 12.6 % (ref 10–15)
GFR SERPL CREATININE-BSD FRML MDRD: 71 ML/MIN/1.7M2
GLUCOSE SERPL-MCNC: 106 MG/DL (ref 70–99)
HCT VFR BLD AUTO: 46 % (ref 40–53)
HGB BLD-MCNC: 15.1 G/DL (ref 13.3–17.7)
IMM GRANULOCYTES # BLD: 0 10E9/L (ref 0–0.4)
IMM GRANULOCYTES NFR BLD: 0.3 %
LDH SERPL L TO P-CCNC: 197 U/L (ref 85–227)
LYMPHOCYTES # BLD AUTO: 1.7 10E9/L (ref 0.8–5.3)
LYMPHOCYTES NFR BLD AUTO: 27.8 %
MCH RBC QN AUTO: 33.5 PG (ref 26.5–33)
MCHC RBC AUTO-ENTMCNC: 32.8 G/DL (ref 31.5–36.5)
MCV RBC AUTO: 102 FL (ref 78–100)
MONOCYTES # BLD AUTO: 0.7 10E9/L (ref 0–1.3)
MONOCYTES NFR BLD AUTO: 12.1 %
NEUTROPHILS # BLD AUTO: 3.5 10E9/L (ref 1.6–8.3)
NEUTROPHILS NFR BLD AUTO: 56.7 %
NRBC # BLD AUTO: 0 10*3/UL
NRBC BLD AUTO-RTO: 0 /100
PLATELET # BLD AUTO: 207 10E9/L (ref 150–450)
POTASSIUM SERPL-SCNC: 4.7 MMOL/L (ref 3.4–5.3)
PROT SERPL-MCNC: 7.6 G/DL (ref 6.8–8.8)
RBC # BLD AUTO: 4.51 10E12/L (ref 4.4–5.9)
SODIUM SERPL-SCNC: 139 MMOL/L (ref 133–144)
WBC # BLD AUTO: 6.1 10E9/L (ref 4–11)

## 2017-10-16 PROCEDURE — 83615 LACTATE (LD) (LDH) ENZYME: CPT | Performed by: INTERNAL MEDICINE

## 2017-10-16 PROCEDURE — 90662 IIV NO PRSV INCREASED AG IM: CPT | Mod: ZF | Performed by: INTERNAL MEDICINE

## 2017-10-16 PROCEDURE — 99213 OFFICE O/P EST LOW 20 MIN: CPT | Mod: ZP | Performed by: INTERNAL MEDICINE

## 2017-10-16 PROCEDURE — G0008 ADMIN INFLUENZA VIRUS VAC: HCPCS

## 2017-10-16 PROCEDURE — 36415 COLL VENOUS BLD VENIPUNCTURE: CPT

## 2017-10-16 PROCEDURE — 99213 OFFICE O/P EST LOW 20 MIN: CPT | Mod: 25

## 2017-10-16 PROCEDURE — 25000128 H RX IP 250 OP 636: Mod: ZF | Performed by: INTERNAL MEDICINE

## 2017-10-16 PROCEDURE — 80053 COMPREHEN METABOLIC PANEL: CPT | Performed by: INTERNAL MEDICINE

## 2017-10-16 PROCEDURE — 85025 COMPLETE CBC W/AUTO DIFF WBC: CPT | Performed by: INTERNAL MEDICINE

## 2017-10-16 RX ADMIN — INFLUENZA A VIRUSA/MICHIGAN/45/2015 X-275 (H1N1) ANTIGEN (FORMALDEHYDE INACTIVATED), INFLUENZA A VIRUS A/HONG KONG/4801/2014 X-263B (H3N2) ANTIGEN (FORMALDEHYDE INACTIVATED), AND INFLUENZA B VIRUS B/BRISBANE/60/2008 ANTIGEN (FORMALDEHYDE INACTIVATED) 0.5 ML: 60; 60; 60 INJECTION, SUSPENSION INTRAMUSCULAR at 08:40

## 2017-10-16 ASSESSMENT — PAIN SCALES - GENERAL: PAINLEVEL: NO PAIN (0)

## 2017-10-16 NOTE — NURSING NOTE
Chief Complaint   Patient presents with     Blood Draw     vpt     Vitals done and labs drawn, see flow sheets.  OLIVER BEAN, CMA

## 2017-10-16 NOTE — NURSING NOTE
"Oncology Rooming Note    October 16, 2017 7:52 AM   Moose Serrano is a 66 year old male who presents for:    Chief Complaint   Patient presents with     Blood Draw     vpt     Oncology Clinic Visit     Lymphoma 6 month follow up- elevated blood pressure today     Initial Vitals: BP (!) 177/106  Pulse 79  Temp 97.9  F (36.6  C) (Oral)  Resp 16  Ht 1.829 m (6' 0.01\")  Wt 102.4 kg (225 lb 11.2 oz)  SpO2 99%  BMI 30.6 kg/m2 Estimated body mass index is 30.6 kg/(m^2) as calculated from the following:    Height as of this encounter: 1.829 m (6' 0.01\").    Weight as of this encounter: 102.4 kg (225 lb 11.2 oz). Body surface area is 2.28 meters squared.  No Pain (0) Comment: Data Unavailable   No LMP for male patient.  Allergies reviewed: Yes  Medications reviewed: Yes    Medications: Medication refills not needed today.  Pharmacy name entered into EPIC:    [facility] [place] [address] [facility] - [place], [place] - [address]    Clinical concerns: Patients blood pressure elevated today. Pain on left side about 1 month ago. Patient would like flu injection today.     10 minutes for nursing intake (face to face time)     Lien Early LPN            "

## 2017-10-16 NOTE — MR AVS SNAPSHOT
After Visit Summary   10/16/2017    Moose Serrano    MRN: [id number]           Patient Information     Date Of Birth          1950        Visit Information        Provider Department      10/16/2017 8:30 AM Carlos Beebe MD [facility]        Today's Diagnoses     Splenic marginal zone b-cell lymphoma (H)           Follow-ups after your visit        Your next 10 appointments already scheduled     Apr 16, 2018  7:30 AM CDT   Masonic Lab Draw with  MASONIC LAB DRAW   [facility] [place]    [address] [place] [id number] [phone]            Apr 16, 2018  8:00 AM CDT   (Arrive by 7:45 AM)   Return Visit with Carlos Beebe MD   [facility]    [address] [place] [id number] [phone]              Future tests that were ordered for you today     Open Future Orders        Priority Expected Expires Ordered    CBC with platelets differential Routine 4/16/2018 10/16/2018 10/16/2017    Comprehensive metabolic panel Routine 4/16/2018 10/16/2018 10/16/2017    Lactate Dehydrogenase Routine 4/16/2018 10/16/2018 10/16/2017            Who to contact     If you have questions or need follow up information about today's clinic visit or your schedule please contact [facility] directly at [phone].  Normal or non-critical lab and imaging results will be communicated to you by MyChart, letter or phone within 4 business days after the clinic has received the results. If you do not hear from us within 7 days, please contact the clinic through MyChart or phone. If you have a critical or abnormal lab result, we will notify you by phone as soon as possible.  Submit refill requests through [employer] or call your pharmacy and they will forward the refill request to us. Please allow 3 business days for  "your refill to be completed.          Additional Information About Your Visit        [employer] Information     [employer] lets you send messages to your doctor, view your test results, renew your prescriptions, schedule appointments and more. To sign up, go to [email or web address] . Click on \"Log in\" on the left side of the screen, which will take you to the Welcome page. Then click on \"Sign up Now\" on the right side of the page.     You will be asked to enter the access code listed below, as well as some personal information. Please follow the directions to create your username and password.     Your access code is: [email or web address]  Expires: 2017  6:30 AM     Your access code will  in 90 days. If you need help or a new code, please call your [place] clinic or [phone].        Care EveryWhere ID     This is your Care EveryWhere ID. This could be used by other organizations to access your [place] medical records  [id number]        Your Vitals Were     Pulse Temperature Respirations Height Pulse Oximetry BMI (Body Mass Index)    79 97.9  F (36.6  C) (Oral) 16 1.829 m (6' 0.01\") 99% 30.6 kg/m2       Blood Pressure from Last 3 Encounters:   10/16/17 (!) 182/107   17 134/82   17 143/83    Weight from Last 3 Encounters:   10/16/17 102.4 kg (225 lb 11.2 oz)   17 98.9 kg (218 lb)   17 98.9 kg (218 lb)              We Performed the Following     CBC with platelets differential     Comprehensive metabolic panel     Lactate Dehydrogenase        Primary Care Provider    None Specified       No primary provider on file.        Equal Access to Services     [facility] : Hadii antonio Leiva, jamila romero, shirley pagan . So [facility] [phone].    ATENCIÓN: Si habla español, tiene a escalante disposición servicios gratuitos de asistencia lingüística. Llame al [phone].    We comply with applicable federal civil rights laws and " [place] laws. We do not discriminate on the basis of race, color, national origin, age, disability, sex, sexual orientation, or gender identity.            Thank you!     Thank you for choosing [facility]  for your care. Our goal is always to provide you with excellent care. Hearing back from our patients is one way we can continue to improve our services. Please take a few minutes to complete the written survey that you may receive in the mail after your visit with us. Thank you!             Your Updated Medication List - Protect others around you: Learn how to safely use, store and throw away your medicines at www.disposemymeds.org.          This list is accurate as of: 10/16/17 10:23 AM.  Always use your most recent med list.                   Brand Name Dispense Instructions for use Diagnosis    amLODIPine 5 MG tablet    NORVASC    90 tablet    Take 1 tablet (5 mg) by mouth daily    HTN, goal below 140/90       levothyroxine 112 MCG tablet    SYNTHROID/LEVOTHROID    90 tablet    TAKE 1 TABLET (112 MCG) BY MOUTH DAILY    Hypothyroidism due to acquired atrophy of thyroid       Multi-vitamin Tabs tablet      Take 1 tablet by mouth daily

## 2017-10-16 NOTE — LETTER
10/16/2017       RE: Moose Serrano  [address] [address]     Dear Colleague,    Thank you for referring your patient, Moose Serrano, to the [facility]. Please see a copy of my visit note below.    REASON FOR VISIT:  Follow up splenic marginal zone lymphoma.       HISTORY OF PRESENT ILLNESS:  Mr. 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   To summarize his course, he was noted to have progressive leukocytosis on routine blood work in mid 2013.  PET/CT revealed splenomegaly and lymphadenopathy in the axilla, pretracheal area, and portacaval area.  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  He was initially monitored without treatment.  After developing abdominal pain, weight loss, and night sweats he was started on rituximab in 7/2014.  He had difficulty tolerating rituximab due to severe infusion reaction, so he was treated with CVP alone and then rituximab was successfully added.  He completed 6 cycles of treatment at the end of 10/2014.  He is here for ongoing follow up.    Since his last visit, there have been no new issues.  He continues to feel well and remains very busy with his work.  Currently he is working in [place].  He has noticed intermittent left flank discomfort, but wonders if it might be digestive/gas.  It is not very bothersome, but he can't help but wonder if it could be lymphoma.  No fevers, chills or night sweats.  Appetite is good.  Weight is up a bit.  He has no chest pain, difficulty with breathing or cough.  He has normal bowel function.  He has no urinary complaints.  He has no bleeding, bruising or skin rashes.  He has no headache, vision changes, focal weakness or sensory changes.  Overall, he is managing well.      REVIEW OF SYSTEMS:  A complete 10-point review of systems was  "negative other than noted.    MEDICATIONS:  Current Outpatient Prescriptions   Medication     amLODIPine (NORVASC) 5 MG tablet     levothyroxine (SYNTHROID/LEVOTHROID) 112 MCG tablet     multivitamin, therapeutic with minerals (MULTI-VITAMIN) TABS     [DISCONTINUED] NO ACTIVE MEDICATIONS     No current facility-administered medications for this visit.      PHYSICAL EXAMINATION:  BP (!) 182/107 (BP Location: Right arm, Patient Position: Sitting, Cuff Size: Adult Regular)  Pulse 79  Temp 97.9  F (36.6  C) (Oral)  Resp 16  Ht 1.829 m (6' 0.01\")  Wt 102.4 kg (225 lb 11.2 oz)  SpO2 99%  BMI 30.6 kg/m2  Wt Readings from Last 5 Encounters:   10/16/17 102.4 kg (225 lb 11.2 oz)   08/01/17 98.9 kg (218 lb)   06/20/17 98.9 kg (218 lb)   03/20/17 97.4 kg (214 lb 11.2 oz)   11/15/16 96.2 kg (212 lb)     BP recheck 165/95  General: Well appearing white male. No acute distress.  HEENT: Sclerae anicteric. No OP lesions, masses, or tonsilar enlargement.  Lungs: Clear bilaterally without wheezing or crackles.  Heart: Regular rate and rhythm without murmurs.  Gastrointestinal: Bowel sounds present, no tenderness to palpation, spleen tip not palpable.  Extremities: No lower extremity edema.  Lymph:  No cervical, clavicular, axillary, epitrochlear, or inguinal lymphadenopathy.  Neuro: Grossly non-focal.  Skin/access: No visible rash. No IV access.  Performance status: ECOG 0    LABORATORY DATA:  Recent Labs   Lab Test  10/16/17   0732  03/20/17   0803  08/29/16   1018   02/24/14   0826   11/25/13   1009  07/24/13   0755   WBC  6.1  5.7  5.9   < >  13.0*   < >  26.7*  15.9*   HGB  15.1  14.6  14.0   < >  12.7*   < >  14.2  13.9   PLT  207  246  229   < >  212   < >  245  227   ANEU  3.5  3.8  3.9   < >  4.3   < >  6.9  5.9   ALYM  1.7  1.3  1.3   < >  7.0*   < >  17.7*  7.5*   RETICABSCT   --    --    --    --   104.4*   --   80.0  59.2    < > = values in this interval not displayed.     Recent Labs   Lab Test  10/16/17   0732  " 03/20/17   0803  08/29/16   1018   05/18/15   0711   11/17/14   1204  10/27/14   1037   07/18/14   0906   NA  139  143  142   < >  140   < >  141  139   < >  142   POTASSIUM  4.7  4.6  4.6   < >  4.9   < >  4.0  4.3   < >  4.5   CHLORIDE  106  109  108   < >  106   < >  107  107   < >  102   CO2  27  28  28   < >  27   < >  24  26   < >  29   BUN  13  14  13   < >  13   < >  8  13   < >  16   CR  1.04  0.86  1.00   < >  0.89   < >  1.05  0.94   < >  0.89   RACHEAL  8.4*  8.4*  8.6   < >  8.6   < >  9.1  9.5   < >  9.0   PHOS   --    --    --    --    --    --    --    --    --   2.9   URIC   --    --    --    --   6.1   --   6.3  5.8   < >  5.0   LDH  197  192  172   < >  194   < >  183  218   < >  464    < > = values in this interval not displayed.     Recent Labs   Lab Test  10/16/17   0732  03/20/17   0803  08/29/16   1018   AST  15  15  9   ALT  27  23  21   ALKPHOS  66  53  57   BILITOTAL  0.8  0.6  0.6     IMPRESSION AND PLAN:  Mr. 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      In regards to his lymphoma, he clinically remains in remission.  Blood counts look great today.  He will let us know if he develops more bothersome or persistent flank discomfort.  We discussed plans for ongoing monitoring at 6-month intervals and the importance that he let us know if there is anything that comes up in between visits.       He has no active infectious issues.  He is up-to-date for pneumococcal vaccinations.  He got a flu shot today.  He will be due for a repeat Pneumovax in 2020.       His blood pressure was elevated again today.  He is asymptomatic and it is coming down.  He has been on amlodipine 5 mg daily.  This has been a recurring issue in our clinic but recent check at PCP was much better.  He will check when he gets home and for the next few days and let Dr. Lawson know if it remains high.       I plan to see him back in about 6 months with labs.  I reminded him call  in the interim if questions, concerns or new issues arise.     Carlos Beebe MD, PhD   of Medicine  Division of Hematology, Oncology, and Transplantation  [email or web address] email  [phone] office  [phone] pager

## 2017-10-16 NOTE — NURSING NOTE
Moose Serrano      1.  Has the patient received the information for the influenza vaccine? YES    2.  Does the patient have any of the following contraindications?     Allergy to eggs? No     Allergic reaction to previous influenza vaccines? No     Any other problems to previous influenza vaccines? No     Paralyzed by Guillain-Eek syndrome? No     Currently pregnant? NO     Current moderate or severe illness? No     Allergy to contact lens solution? No    3.  The vaccine has been administered in the usual fashion : YES    Vaccination given by Lien Early.  Recorded by Lien Early

## 2017-10-16 NOTE — PROGRESS NOTES
REASON FOR VISIT:  Follow up splenic marginal zone lymphoma.       HISTORY OF PRESENT ILLNESS:  Mr. 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   To summarize his course, he was noted to have progressive leukocytosis on routine blood work in mid 2013.  PET/CT revealed splenomegaly and lymphadenopathy in the axilla, pretracheal area, and portacaval area.  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  He was initially monitored without treatment.  After developing abdominal pain, weight loss, and night sweats he was started on rituximab in 7/2014.  He had difficulty tolerating rituximab due to severe infusion reaction, so he was treated with CVP alone and then rituximab was successfully added.  He completed 6 cycles of treatment at the end of 10/2014.  He is here for ongoing follow up.    Since his last visit, there have been no new issues.  He continues to feel well and remains very busy with his work.  Currently he is working in [place].  He has noticed intermittent left flank discomfort, but wonders if it might be digestive/gas.  It is not very bothersome, but he can't help but wonder if it could be lymphoma.  No fevers, chills or night sweats.  Appetite is good.  Weight is up a bit.  He has no chest pain, difficulty with breathing or cough.  He has normal bowel function.  He has no urinary complaints.  He has no bleeding, bruising or skin rashes.  He has no headache, vision changes, focal weakness or sensory changes.  Overall, he is managing well.      REVIEW OF SYSTEMS:  A complete 10-point review of systems was negative other than noted.    MEDICATIONS:  Current Outpatient Prescriptions   Medication     amLODIPine (NORVASC) 5 MG tablet     levothyroxine (SYNTHROID/LEVOTHROID) 112 MCG tablet     multivitamin, therapeutic with minerals (MULTI-VITAMIN)  "TABS     [DISCONTINUED] NO ACTIVE MEDICATIONS     No current facility-administered medications for this visit.      PHYSICAL EXAMINATION:  BP (!) 182/107 (BP Location: Right arm, Patient Position: Sitting, Cuff Size: Adult Regular)  Pulse 79  Temp 97.9  F (36.6  C) (Oral)  Resp 16  Ht 1.829 m (6' 0.01\")  Wt 102.4 kg (225 lb 11.2 oz)  SpO2 99%  BMI 30.6 kg/m2  Wt Readings from Last 5 Encounters:   10/16/17 102.4 kg (225 lb 11.2 oz)   08/01/17 98.9 kg (218 lb)   06/20/17 98.9 kg (218 lb)   03/20/17 97.4 kg (214 lb 11.2 oz)   11/15/16 96.2 kg (212 lb)     BP recheck 165/95  General: Well appearing white male. No acute distress.  HEENT: Sclerae anicteric. No OP lesions, masses, or tonsilar enlargement.  Lungs: Clear bilaterally without wheezing or crackles.  Heart: Regular rate and rhythm without murmurs.  Gastrointestinal: Bowel sounds present, no tenderness to palpation, spleen tip not palpable.  Extremities: No lower extremity edema.  Lymph:  No cervical, clavicular, axillary, epitrochlear, or inguinal lymphadenopathy.  Neuro: Grossly non-focal.  Skin/access: No visible rash. No IV access.  Performance status: ECOG 0    LABORATORY DATA:  Recent Labs   Lab Test  10/16/17   0732  03/20/17   0803  08/29/16   1018   02/24/14   0826   11/25/13   1009  07/24/13   0755   WBC  6.1  5.7  5.9   < >  13.0*   < >  26.7*  15.9*   HGB  15.1  14.6  14.0   < >  12.7*   < >  14.2  13.9   PLT  207  246  229   < >  212   < >  245  227   ANEU  3.5  3.8  3.9   < >  4.3   < >  6.9  5.9   ALYM  1.7  1.3  1.3   < >  7.0*   < >  17.7*  7.5*   RETICABSCT   --    --    --    --   104.4*   --   80.0  59.2    < > = values in this interval not displayed.     Recent Labs   Lab Test  10/16/17   0732  03/20/17   0803  08/29/16   1018   05/18/15   0711   11/17/14   1204  10/27/14   1037   07/18/14   0906   NA  139  143  142   < >  140   < >  141  139   < >  142   POTASSIUM  4.7  4.6  4.6   < >  4.9   < >  4.0  4.3   < >  4.5   CHLORIDE  106  " 109  108   < >  106   < >  107  107   < >  102   CO2  27  28  28   < >  27   < >  24  26   < >  29   BUN  13  14  13   < >  13   < >  8  13   < >  16   CR  1.04  0.86  1.00   < >  0.89   < >  1.05  0.94   < >  0.89   RACHEAL  8.4*  8.4*  8.6   < >  8.6   < >  9.1  9.5   < >  9.0   PHOS   --    --    --    --    --    --    --    --    --   2.9   URIC   --    --    --    --   6.1   --   6.3  5.8   < >  5.0   LDH  197  192  172   < >  194   < >  183  218   < >  464    < > = values in this interval not displayed.     Recent Labs   Lab Test  10/16/17   0732  03/20/17   0803  08/29/16   1018   AST  15  15  9   ALT  27  23  21   ALKPHOS  66  53  57   BILITOTAL  0.8  0.6  0.6     IMPRESSION AND PLAN:  Mr. 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      In regards to his lymphoma, he clinically remains in remission.  Blood counts look great today.  He will let us know if he develops more bothersome or persistent flank discomfort.  We discussed plans for ongoing monitoring at 6-month intervals and the importance that he let us know if there is anything that comes up in between visits.       He has no active infectious issues.  He is up-to-date for pneumococcal vaccinations.  He got a flu shot today.  He will be due for a repeat Pneumovax in 2020.       His blood pressure was elevated again today.  He is asymptomatic and it is coming down.  He has been on amlodipine 5 mg daily.  This has been a recurring issue in our clinic but recent check at PCP was much better.  He will check when he gets home and for the next few days and let Dr. Lawson know if it remains high.       I plan to see him back in about 6 months with labs.  I reminded him call in the interim if questions, concerns or new issues arise.     Carlos Beebe MD, PhD   of Medicine  Division of Hematology, Oncology, and Transplantation  [email or web address] email  [phone] office  [phone] pager

## 2018-04-16 ENCOUNTER — ONCOLOGY VISIT (OUTPATIENT)
Dept: ONCOLOGY | Facility: CLINIC | Age: 68
End: 2018-04-16
Attending: INTERNAL MEDICINE
Payer: COMMERCIAL

## 2018-04-16 VITALS
RESPIRATION RATE: 18 BRPM | OXYGEN SATURATION: 96 % | SYSTOLIC BLOOD PRESSURE: 186 MMHG | WEIGHT: 218.9 LBS | HEART RATE: 65 BPM | DIASTOLIC BLOOD PRESSURE: 96 MMHG | BODY MASS INDEX: 29.68 KG/M2 | TEMPERATURE: 98 F

## 2018-04-16 DIAGNOSIS — C83.07 SPLENIC MARGINAL ZONE B-CELL LYMPHOMA (H): ICD-10-CM

## 2018-04-16 DIAGNOSIS — C83.07 SPLENIC MARGINAL ZONE B-CELL LYMPHOMA (H): Primary | ICD-10-CM

## 2018-04-16 DIAGNOSIS — I10 HTN, GOAL BELOW 140/90: ICD-10-CM

## 2018-04-16 LAB
ALBUMIN SERPL-MCNC: 3.9 G/DL (ref 3.4–5)
ALP SERPL-CCNC: 59 U/L (ref 40–150)
ALT SERPL W P-5'-P-CCNC: 19 U/L (ref 0–70)
ANION GAP SERPL CALCULATED.3IONS-SCNC: 7 MMOL/L (ref 3–14)
AST SERPL W P-5'-P-CCNC: 16 U/L (ref 0–45)
BASOPHILS # BLD AUTO: 0.1 10E9/L (ref 0–0.2)
BASOPHILS NFR BLD AUTO: 0.9 %
BILIRUB SERPL-MCNC: 0.6 MG/DL (ref 0.2–1.3)
BUN SERPL-MCNC: 11 MG/DL (ref 7–30)
CALCIUM SERPL-MCNC: 8.6 MG/DL (ref 8.5–10.1)
CHLORIDE SERPL-SCNC: 106 MMOL/L (ref 94–109)
CO2 SERPL-SCNC: 27 MMOL/L (ref 20–32)
CREAT SERPL-MCNC: 0.98 MG/DL (ref 0.66–1.25)
DIFFERENTIAL METHOD BLD: ABNORMAL
EOSINOPHIL # BLD AUTO: 0.1 10E9/L (ref 0–0.7)
EOSINOPHIL NFR BLD AUTO: 1.6 %
ERYTHROCYTE [DISTWIDTH] IN BLOOD BY AUTOMATED COUNT: 12 % (ref 10–15)
GFR SERPL CREATININE-BSD FRML MDRD: 76 ML/MIN/1.7M2
GLUCOSE SERPL-MCNC: 103 MG/DL (ref 70–99)
HCT VFR BLD AUTO: 45.1 % (ref 40–53)
HGB BLD-MCNC: 15.6 G/DL (ref 13.3–17.7)
IMM GRANULOCYTES # BLD: 0 10E9/L (ref 0–0.4)
IMM GRANULOCYTES NFR BLD: 0.2 %
LDH SERPL L TO P-CCNC: 162 U/L (ref 85–227)
LYMPHOCYTES # BLD AUTO: 1.5 10E9/L (ref 0.8–5.3)
LYMPHOCYTES NFR BLD AUTO: 26.1 %
MCH RBC QN AUTO: 34.1 PG (ref 26.5–33)
MCHC RBC AUTO-ENTMCNC: 34.6 G/DL (ref 31.5–36.5)
MCV RBC AUTO: 99 FL (ref 78–100)
MONOCYTES # BLD AUTO: 0.6 10E9/L (ref 0–1.3)
MONOCYTES NFR BLD AUTO: 11.1 %
NEUTROPHILS # BLD AUTO: 3.5 10E9/L (ref 1.6–8.3)
NEUTROPHILS NFR BLD AUTO: 60.1 %
NRBC # BLD AUTO: 0 10*3/UL
NRBC BLD AUTO-RTO: 0 /100
PLATELET # BLD AUTO: 208 10E9/L (ref 150–450)
POTASSIUM SERPL-SCNC: 4.4 MMOL/L (ref 3.4–5.3)
PROT SERPL-MCNC: 7.3 G/DL (ref 6.8–8.8)
RBC # BLD AUTO: 4.58 10E12/L (ref 4.4–5.9)
SODIUM SERPL-SCNC: 141 MMOL/L (ref 133–144)
WBC # BLD AUTO: 5.8 10E9/L (ref 4–11)

## 2018-04-16 PROCEDURE — 99214 OFFICE O/P EST MOD 30 MIN: CPT | Mod: GC | Performed by: INTERNAL MEDICINE

## 2018-04-16 PROCEDURE — 36415 COLL VENOUS BLD VENIPUNCTURE: CPT

## 2018-04-16 PROCEDURE — 80053 COMPREHEN METABOLIC PANEL: CPT | Performed by: INTERNAL MEDICINE

## 2018-04-16 PROCEDURE — 83615 LACTATE (LD) (LDH) ENZYME: CPT | Performed by: INTERNAL MEDICINE

## 2018-04-16 PROCEDURE — 85025 COMPLETE CBC W/AUTO DIFF WBC: CPT | Performed by: INTERNAL MEDICINE

## 2018-04-16 PROCEDURE — G0463 HOSPITAL OUTPT CLINIC VISIT: HCPCS | Mod: ZF

## 2018-04-16 ASSESSMENT — PAIN SCALES - GENERAL: PAINLEVEL: NO PAIN (0)

## 2018-04-16 NOTE — MR AVS SNAPSHOT
"              After Visit Summary   4/16/2018    Moose Serrano    MRN: [id number]           Patient Information     Date Of Birth          1950        Visit Information        Provider Department      4/16/2018 8:00 AM Carlos Beebe MD [facility]         Follow-ups after your visit        Your next 10 appointments already scheduled     Oct 15, 2018  7:30 AM CDT   [facility] with  [facility]   [facility] ([facility])    [address] [place] [address]  [address]  [facility] [address] [phone]            Oct 15, 2018  8:00 AM CDT   (Arrive by 7:45 AM)   Return Visit with Carlos Beebe MD   [facility] ([facility])    [address] [place] [address]  [address] [place] [id number] [phone]              Who to contact     If you have questions or need follow up information about today's clinic visit or your schedule please contact [facility] directly at [phone].  Normal or non-critical lab and imaging results will be communicated to you by Revolverhart, letter or phone within 4 business days after the clinic has received the results. If you do not hear from us within 7 days, please contact the clinic through Revolverhart or phone. If you have a critical or abnormal lab result, we will notify you by phone as soon as possible.  Submit refill requests through [email or web address] or call your pharmacy and they will forward the refill request to us. Please allow 3 business days for your refill to be completed.          Additional Information About Your Visit        [email or web address] Information     [email or web address] lets you send messages to your doctor, view your test results, renew your prescriptions, schedule appointments and more. To sign up, go to [email or web address] . Click on \"Log in\" on the left side of the screen, which will take you to the Welcome page. Then click on \"Sign up Now\" on the " right side of the page.     You will be asked to enter the access code listed below, as well as some personal information. Please follow the directions to create your username and password.     Your access code is: [email or web address]  Expires: 2018  6:30 AM     Your access code will  in 90 days. If you need help or a new code, please call your [facility] or [phone].        Care EveryWhere ID     This is your Care EveryWhere ID. This could be used by other organizations to access your [place] medical records  [id number]        Your Vitals Were     Pulse Temperature Respirations Pulse Oximetry BMI (Body Mass Index)       65 98  F (36.7  C) (Oral) 18 96% 29.68 kg/m2        Blood Pressure from Last 3 Encounters:   18 (!) 186/96   10/16/17 (!) 182/107   17 134/82    Weight from Last 3 Encounters:   18 99.3 kg (218 lb 14.4 oz)   10/16/17 102.4 kg (225 lb 11.2 oz)   17 98.9 kg (218 lb)              Today, you had the following     No orders found for display       Primary Care Provider Fax #    Physician No Ref-Primary [phone]       No address on file        Equal Access to Services     GEOVANNI MENON : Hadii antonio britoo Soomaali, waaxda luqadaha, qaybta kaalmada adeegyada, shirley lama . So [facility] [phone].    ATENCIÓN: Si habla español, tiene a escalante disposición servicios gratuitos de asistencia lingüística. Llame al [phone].    We comply with applicable federal civil rights laws and [place] laws. We do not discriminate on the basis of race, color, national origin, age, disability, sex, sexual orientation, or gender identity.            Thank you!     Thank you for choosing [facility]  for your care. Our goal is always to provide you with excellent care. Hearing back from our patients is one way we can continue to improve our services. Please take a few minutes to complete the written survey that you may receive in the  mail after your visit with us. Thank you!             Your Updated Medication List - Protect others around you: Learn how to safely use, store and throw away your medicines at www.disposemymeds.org.          This list is accurate as of 4/16/18  8:50 AM.  Always use your most recent med list.                   Brand Name Dispense Instructions for use Diagnosis    amLODIPine 5 MG tablet    NORVASC    90 tablet    Take 1 tablet (5 mg) by mouth daily    HTN, goal below 140/90       levothyroxine 112 MCG tablet    SYNTHROID/LEVOTHROID    90 tablet    TAKE 1 TABLET (112 MCG) BY MOUTH DAILY    Hypothyroidism due to acquired atrophy of thyroid       Multi-vitamin Tabs tablet      Take 1 tablet by mouth daily

## 2018-04-16 NOTE — NURSING NOTE
"Oncology Rooming Note    April 16, 2018 7:57 AM   Moose Serrano is a 67 year old male who presents for:    Chief Complaint   Patient presents with     Oncology Clinic Visit     Return: Lymphoma      Initial Vitals: BP (!) 186/96  Pulse 65  Temp 98  F (36.7  C) (Oral)  Resp 18  Wt 99.3 kg (218 lb 14.4 oz)  SpO2 96%  BMI 29.68 kg/m2 Estimated body mass index is 29.68 kg/(m^2) as calculated from the following:    Height as of 10/16/17: 1.829 m (6' 0.01\").    Weight as of this encounter: 99.3 kg (218 lb 14.4 oz). Body surface area is 2.25 meters squared.  No Pain (0) Comment: Data Unavailable   No LMP for male patient.  Allergies reviewed: Yes  Medications reviewed: Yes    Medications: Medication refills not needed today.  Pharmacy name entered into EPIC:    [facility] [place] [address] [facility] - [place], [place] - [address]    Clinical concerns: no new concerns Dr. Beebe was NOT notified.    10 minutes for nursing intake (face to face time)     Noemí Hammer CMA              "

## 2018-04-16 NOTE — LETTER
4/16/2018       RE: Moose Serrano  [address] [facility] [address]     Dear Colleague,    Thank you for referring your patient, Moose Serrano, to the [facility]. Please see a copy of my visit note below.    REASON FOR VISIT:  Follow up splenic marginal zone lymphoma.       HISTORY OF PRESENT ILLNESS:  Mr. 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   To summarize his course, he was noted to have progressive leukocytosis on routine blood work in mid 2013.  PET/CT revealed splenomegaly and lymphadenopathy in the axilla, pretracheal area, and portacaval area.  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  He was initially monitored without treatment.  After developing abdominal pain, weight loss, and night sweats he was started on rituximab in 7/2014.  He had difficulty tolerating rituximab due to severe infusion reaction, so he was treated with CVP alone and then rituximab was successfully added.  He completed 6 cycles of treatment at the end of 10/2014.  He is here for ongoing follow up.    Since his last visit, there have been no new issues.  He continues to feel well and remains busy with his work.  No fevers, chills, abdominal pain or night sweats.  Appetite is good.  He has no chest pain, difficulty with breathing or cough.  He has not had any serious infectious episodes.  He has not been taking his HTN meds for the past 2 days. Otherwise, overall he is doing quite well.       REVIEW OF SYSTEMS:  A complete 10-point review of systems was negative other than noted.    MEDICATIONS:  Current Outpatient Prescriptions   Medication     amLODIPine (NORVASC) 5 MG tablet     levothyroxine (SYNTHROID/LEVOTHROID) 112 MCG tablet     multivitamin, therapeutic with minerals (MULTI-VITAMIN) TABS     [DISCONTINUED] NO ACTIVE MEDICATIONS      No current facility-administered medications for this visit.      PHYSICAL EXAMINATION:  BP (!) 186/96  Pulse 65  Temp 98  F (36.7  C) (Oral)  Resp 18  Wt 99.3 kg (218 lb 14.4 oz)  SpO2 96%  BMI 29.68 kg/m2  Wt Readings from Last 5 Encounters:   04/16/18 99.3 kg (218 lb 14.4 oz)   10/16/17 102.4 kg (225 lb 11.2 oz)   08/01/17 98.9 kg (218 lb)   06/20/17 98.9 kg (218 lb)   03/20/17 97.4 kg (214 lb 11.2 oz)     BP recheck 165/95  General: Well appearing white male. No acute distress.  HEENT: Sclerae anicteric. No OP lesions, masses, or tonsilar enlargement.  Lungs: Clear bilaterally without wheezing or crackles.  Heart: Regular rate and rhythm without murmurs.  Gastrointestinal: Bowel sounds present, no tenderness to palpation, spleen tip not palpable.  Extremities: No lower extremity edema.  Lymph:  No cervical, clavicular, axillary, epitrochlear, or inguinal lymphadenopathy.  Neuro: Grossly non-focal.  Skin/access: No visible rash. No IV access.  Performance status: ECOG 0    LABORATORY DATA:  Recent Labs   Lab Test  04/16/18   0721  10/16/17   0732  03/20/17   0803   02/24/14   0826   11/25/13   1009  07/24/13   0755   WBC  5.8  6.1  5.7   < >  13.0*   < >  26.7*  15.9*   HGB  15.6  15.1  14.6   < >  12.7*   < >  14.2  13.9   PLT  208  207  246   < >  212   < >  245  227   ANEU  3.5  3.5  3.8   < >  4.3   < >  6.9  5.9   ALYM  1.5  1.7  1.3   < >  7.0*   < >  17.7*  7.5*   RETICABSCT   --    --    --    --   104.4*   --   80.0  59.2    < > = values in this interval not displayed.     Recent Labs   Lab Test  04/16/18   0721  10/16/17   0732  03/20/17   0803   05/18/15   0711   11/17/14   1204  10/27/14   1037   07/18/14   0906   NA  141  139  143   < >  140   < >  141  139   < >  142   POTASSIUM  4.4  4.7  4.6   < >  4.9   < >  4.0  4.3   < >  4.5   CHLORIDE  106  106  109   < >  106   < >  107  107   < >  102   CO2  27  27  28   < >  27   < >  24  26   < >  29   BUN  11  13  14   < >  13   < >  8  13   <  >  16   CR  0.98  1.04  0.86   < >  0.89   < >  1.05  0.94   < >  0.89   RACHEAL  8.6  8.4*  8.4*   < >  8.6   < >  9.1  9.5   < >  9.0   PHOS   --    --    --    --    --    --    --    --    --   2.9   URIC   --    --    --    --   6.1   --   6.3  5.8   < >  5.0   LDH  162  197  192   < >  194   < >  183  218   < >  464    < > = values in this interval not displayed.     Recent Labs   Lab Test  04/16/18   0721  10/16/17   0732  03/20/17   0803   AST  16  15  15   ALT  19  27  23   ALKPHOS  59  66  53   BILITOTAL  0.6  0.8  0.6       IMPRESSION AND PLAN:  Mr. 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      In regards to his lymphoma, he clinically remains in remission.  Blood counts look great today. There is no splenomegaly on physical exam.  We discussed plans for ongoing monitoring at 6-month intervals and the importance that he let us know if there is anything that comes up in between visits.       He has no active infectious issues.  He is up-to-date for pneumococcal vaccinations.  He got a flu shot today.  He will be due for a repeat Pneumovax in 2020.       His blood pressure was elevated again today.  He is asymptomatic. He admits to being forgetful about his blood pressure medication.  I reinforced him to take anti-HTN medication as prescribed.  He will continue to work with Dr. Lawson for his general health maintenance including hypertension.        I plan to see him back in about 6 months with labs.  I reminded him to call in the interim if questions, concerns or new issues arise.     The patient was seen and discussed with Dr. Beebe.    Daniela Medeiros MD  BMT/Hem/Onc Fellow    ATTENDING ADDENDUM:  The patient has been seen and evaluated by me.  I have reviewed today's vital signs, medications, labs, and imaging results independently, and have discussed the patient and plan with the fellow, and agree with the findings and plan outlined in this note.  The  impression and plan were jointly made.    Carlos Beebe MD, PhD  Attending Physician, [facility]   of Medicine, [facility]  Division of Hematology, Oncology, and Transplantation  [email or web address] email  [phone] [facility]  [phone] office  [phone] pager

## 2018-04-16 NOTE — NURSING NOTE
Chief Complaint   Patient presents with     Blood Draw     Labs only     Vitals done, labs drawn by venipuncture.  See doc flow sheets for details.  Cee Reese CMA

## 2018-04-17 NOTE — PROGRESS NOTES
REASON FOR VISIT:  Follow up splenic marginal zone lymphoma.       HISTORY OF PRESENT ILLNESS:  Mr. 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   To summarize his course, he was noted to have progressive leukocytosis on routine blood work in mid 2013.  PET/CT revealed splenomegaly and lymphadenopathy in the axilla, pretracheal area, and portacaval area.  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  He was initially monitored without treatment.  After developing abdominal pain, weight loss, and night sweats he was started on rituximab in 7/2014.  He had difficulty tolerating rituximab due to severe infusion reaction, so he was treated with CVP alone and then rituximab was successfully added.  He completed 6 cycles of treatment at the end of 10/2014.  He is here for ongoing follow up.    Since his last visit, there have been no new issues.  He continues to feel well and remains busy with his work.  No fevers, chills, abdominal pain or night sweats.  Appetite is good.  He has no chest pain, difficulty with breathing or cough.  He has not had any serious infectious episodes.  He has not been taking his HTN meds for the past 2 days. Otherwise, overall he is doing quite well.       REVIEW OF SYSTEMS:  A complete 10-point review of systems was negative other than noted.    MEDICATIONS:  Current Outpatient Prescriptions   Medication     amLODIPine (NORVASC) 5 MG tablet     levothyroxine (SYNTHROID/LEVOTHROID) 112 MCG tablet     multivitamin, therapeutic with minerals (MULTI-VITAMIN) TABS     [DISCONTINUED] NO ACTIVE MEDICATIONS     No current facility-administered medications for this visit.      PHYSICAL EXAMINATION:  BP (!) 186/96  Pulse 65  Temp 98  F (36.7  C) (Oral)  Resp 18  Wt 99.3 kg (218 lb 14.4 oz)  SpO2 96%  BMI 29.68 kg/m2  Wt Readings from  Last 5 Encounters:   04/16/18 99.3 kg (218 lb 14.4 oz)   10/16/17 102.4 kg (225 lb 11.2 oz)   08/01/17 98.9 kg (218 lb)   06/20/17 98.9 kg (218 lb)   03/20/17 97.4 kg (214 lb 11.2 oz)     BP recheck 165/95  General: Well appearing white male. No acute distress.  HEENT: Sclerae anicteric. No OP lesions, masses, or tonsilar enlargement.  Lungs: Clear bilaterally without wheezing or crackles.  Heart: Regular rate and rhythm without murmurs.  Gastrointestinal: Bowel sounds present, no tenderness to palpation, spleen tip not palpable.  Extremities: No lower extremity edema.  Lymph:  No cervical, clavicular, axillary, epitrochlear, or inguinal lymphadenopathy.  Neuro: Grossly non-focal.  Skin/access: No visible rash. No IV access.  Performance status: ECOG 0    LABORATORY DATA:  Recent Labs   Lab Test  04/16/18   0721  10/16/17   0732  03/20/17   0803   02/24/14   0826   11/25/13   1009  07/24/13   0755   WBC  5.8  6.1  5.7   < >  13.0*   < >  26.7*  15.9*   HGB  15.6  15.1  14.6   < >  12.7*   < >  14.2  13.9   PLT  208  207  246   < >  212   < >  245  227   ANEU  3.5  3.5  3.8   < >  4.3   < >  6.9  5.9   ALYM  1.5  1.7  1.3   < >  7.0*   < >  17.7*  7.5*   RETICABSCT   --    --    --    --   104.4*   --   80.0  59.2    < > = values in this interval not displayed.     Recent Labs   Lab Test  04/16/18   0721  10/16/17   0732  03/20/17   0803   05/18/15   0711   11/17/14   1204  10/27/14   1037   07/18/14   0906   NA  141  139  143   < >  140   < >  141  139   < >  142   POTASSIUM  4.4  4.7  4.6   < >  4.9   < >  4.0  4.3   < >  4.5   CHLORIDE  106  106  109   < >  106   < >  107  107   < >  102   CO2  27  27  28   < >  27   < >  24  26   < >  29   BUN  11  13  14   < >  13   < >  8  13   < >  16   CR  0.98  1.04  0.86   < >  0.89   < >  1.05  0.94   < >  0.89   RACHEAL  8.6  8.4*  8.4*   < >  8.6   < >  9.1  9.5   < >  9.0   PHOS   --    --    --    --    --    --    --    --    --   2.9   URIC   --    --    --    --   6.1    --   6.3  5.8   < >  5.0   LDH  162  197  192   < >  194   < >  183  218   < >  464    < > = values in this interval not displayed.     Recent Labs   Lab Test  04/16/18   0721  10/16/17   0732  03/20/17   0803   AST  16  15  15   ALT  19  27  23   ALKPHOS  59  66  53   BILITOTAL  0.6  0.8  0.6       IMPRESSION AND PLAN:  Mr. 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      In regards to his lymphoma, he clinically remains in remission.  Blood counts look great today. There is no splenomegaly on physical exam.  We discussed plans for ongoing monitoring at 6-month intervals and the importance that he let us know if there is anything that comes up in between visits.       He has no active infectious issues.  He is up-to-date for pneumococcal vaccinations.  He got a flu shot today.  He will be due for a repeat Pneumovax in 2020.       His blood pressure was elevated again today.  He is asymptomatic. He admits to being forgetful about his blood pressure medication.  I reinforced him to take anti-HTN medication as prescribed.  He will continue to work with Dr. Lawson for his general health maintenance including hypertension.        I plan to see him back in about 6 months with labs.  I reminded him to call in the interim if questions, concerns or new issues arise.     The patient was seen and discussed with Dr. Beebe.    Daniela Medeiros MD  BMT/Hem/Onc Fellow    ATTENDING ADDENDUM:  The patient has been seen and evaluated by me.  I have reviewed today's vital signs, medications, labs, and imaging results independently, and have discussed the patient and plan with the fellow, and agree with the findings and plan outlined in this note.  The impression and plan were jointly made.    Carlos Beebe MD, PhD  Attending Physician, [facility]   of Medicine, [facility]  Division of Hematology, Oncology, and  Transplantation  [email or web address] email  [phone] [facility]  [phone] office  [phone] pager

## 2018-06-21 NOTE — LETTER
"Citlali Karimi is a 70 y.o. female patient.    Temp: 98 °F (36.7 °C) (06/21/18 1130)  Pulse: 90 (06/21/18 1301)  Resp: 14 (06/21/18 1301)  BP: (!) 132/55 (06/21/18 1135)  SpO2: 100 % (06/21/18 1301)  Weight: 77 kg (169 lb 12.1 oz) (06/21/18 0515)  Height: 5' 2" (157.5 cm) (06/21/18 0515)  Mallampati Scale: Class II  ASA Classification: Class 2    Central Line  Date/Time: 6/21/2018 2:30 PM  Performed by: AXEL FITCH  Pre-operative Diagnosis: respiratory failure  Post-operative diagnosis: same  Consent Done: Yes  Time out: Immediately prior to procedure a "time out" was called to verify the correct patient, procedure, equipment, support staff and site/side marked as required.  Indications: med administration and vascular access  Anesthesia: local infiltration    Anesthesia:  Local Anesthetic: lidocaine 1% without epinephrine  Anesthetic total: 10 mL  Preparation: skin prepped with ChloraPrep  Skin prep agent dried: skin prep agent completely dried prior to procedure  Sterile barriers: all five maximum sterile barriers used - cap, mask, sterile gown, sterile gloves, and large sterile sheet  Hand hygiene: hand hygiene performed prior to central venous catheter insertion  Location details: left internal jugular  Catheter type: triple lumen  Catheter size: 8 Fr  Catheter Length: 14cm    Ultrasound guidance: yes  Vessel Caliber: small, compressibility poor  Needle advanced into vessel with real time Ultrasound guidance.  Guidewire confirmed in vessel.  Sterile sheath used.  Manometry: No   Number of attempts: 2  Assessment: placement verified by x-ray and successful placement  Complications: none  Specimens: No  Implants: No  Post-procedure: line sutured,  chlorhexidine patch,  sterile dressing applied and blood return through all ports  Complications: No          Axel Fitch  6/21/2018  " 3/20/2017       RE: Moose Serrano  [address] [facility] [address]     Dear Colleague,    Thank you for referring your patient, Moose Serrano, to the [facility]. Please see a copy of my visit note below.    REASON FOR VISIT:  Follow up splenic marginal zone lymphoma.       HISTORY OF PRESENT ILLNESS:  Mr. 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   To summarize his course, he was noted to have progressive leukocytosis on routine blood work in mid 2013.  PET/CT revealed splenomegaly and lymphadenopathy in the axilla, pretracheal area, and portacaval area.  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  He was initially monitored without treatment.  After developing abdominal pain, weight loss, and night sweats he was started on rituximab in 7/2014.  He had difficulty tolerating rituximab due to severe infusion reaction, so he was treated with CVP alone and then rituximab was successfully added.  He completed 6 cycles of treatment at the end of 10/2014.  He is here for ongoing follow up.    Since his last visit, there have been no new issues.  He continues to feel well and remains very busy with his work.  Currently he is in [facility] for work.  He has had no fevers, chills or night sweats.  Appetite is decent.  Weight is stable.  He has no chest pain, difficulty with breathing or cough.  He has normal bowel function.  He has no urinary complaints.  He has no bleeding, bruising or skin rashes.  He has no headache, vision changes, focal weakness or sensory changes.  Overall, he is managing well.      REVIEW OF SYSTEMS:  A complete 10-point review of systems was negative other than noted.    MEDICATIONS:  Current Outpatient Prescriptions   Medication     azithromycin (ZITHROMAX) 250 MG tablet     levothyroxine  (SYNTHROID, LEVOTHROID) 112 MCG tablet     multivitamin, therapeutic with minerals (MULTI-VITAMIN) TABS     [DISCONTINUED] NO ACTIVE MEDICATIONS     No current facility-administered medications for this visit.      PHYSICAL EXAMINATION:  BP (!) 178/99  Pulse 70  Temp 97.8  F (36.6  C) (Oral)  Resp 16  Wt 97.4 kg (214 lb 11.2 oz)  SpO2 97%  BMI 29.12 kg/m2  Wt Readings from Last 5 Encounters:   03/20/17 97.4 kg (214 lb 11.2 oz)   11/15/16 96.2 kg (212 lb)   08/29/16 99.8 kg (220 lb 0.3 oz)   05/16/16 99.5 kg (219 lb 4.8 oz)   02/15/16 101.1 kg (222 lb 14.4 oz)     General: Well appearing white male. No acute distress.  HEENT: Sclerae anicteric. No OP lesions, masses, or tonsilar enlargement.  Lungs: Clear bilaterally without wheezing or crackles.  Heart: Regular rate and rhythm without murmurs.  Abd: Bowel sounds present, no tenderness to palpation, spleen tip not palpable.  Extremities: No lower extremity edema.  Lymph:  No cervical, clavicular, axillary, epitrochlear, or inguinal lymphadenopathy.  Neuro: Grossly non-focal.  Performance status: ECOG 0  Skin:  Soft mobile subq lesions on R clavicle and forehead stable    LABORATORY DATA:  Recent Labs   Lab Test  03/20/17   0803  08/29/16   1018  05/16/16   0752   02/24/14   0826   11/25/13   1009  07/24/13   0755   WBC  5.7  5.9  5.0   < >  13.0*   < >  26.7*  15.9*   HGB  14.6  14.0  14.6   < >  12.7*   < >  14.2  13.9   PLT  246  229  279   < >  212   < >  245  227   ANEU  3.8  3.9  3.0   < >  4.3   < >  6.9  5.9   ALYM  1.3  1.3  1.1   < >  7.0*   < >  17.7*  7.5*   RETICABSCT   --    --    --    --   104.4*   --   80.0  59.2    < > = values in this interval not displayed.     Recent Labs   Lab Test  03/20/17   0803  08/29/16   1018  05/16/16   0752   05/18/15   0711   11/17/14   1204  10/27/14   1037   07/18/14   0906   NA  143  142  140   < >  140   < >  141  139   < >  142   POTASSIUM  4.6  4.6  4.8   < >  4.9   < >  4.0  4.3   < >  4.5   CHLORIDE  109  108   105   < >  106   < >  107  107   < >  102   CO2  28  28  29   < >  27   < >  24  26   < >  29   BUN  14  13  10   < >  13   < >  8  13   < >  16   CR  0.86  1.00  1.07   < >  0.89   < >  1.05  0.94   < >  0.89   RACHEAL  8.4*  8.6  8.5   < >  8.6   < >  9.1  9.5   < >  9.0   PHOS   --    --    --    --    --    --    --    --    --   2.9   URIC   --    --    --    --   6.1   --   6.3  5.8   < >  5.0   LDH  192  172  202   < >  194   < >  183  218   < >  464    < > = values in this interval not displayed.     Recent Labs   Lab Test  03/20/17   0803  08/29/16   1018  05/16/16   0752   AST  15  9  22   ALT  23  21  34   ALKPHOS  53  57  68   BILITOTAL  0.6  0.6  0.7     IMPRESSION AND PLAN:  Mr. 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      In regards to his lymphoma, he clinically remains in remission.  Blood counts look great today.  We discussed plans for ongoing monitoring at 6-month intervals and the importance that he let us know if there is anything that comes up in between visits.       He has no active infectious issues.  He is up-to-date for pneumococcal and influenza vaccinations.  He will be due for a repeat Pneumovax in 2020.       He is now working with Dr. Lawson for his routine medical issues.  Notably, he continues to be hypertensive when he is at our clinic and relates it to stress of visit (and need to rush to the airport immediately after our visit).  Indeed, at his last visit in his primary care clinic his blood pressure was normal and so we will not make any changes, but will send this note to Dr. Lawson.       I plan to see him back in 6 months with labs.  I reminded him call in the interim if questions, concerns or new issues arise.     Carlos Beebe MD, PhD   of Medicine  Division of Hematology, Oncology, and Transplantation  [email or web address] email  [phone] office  [phone] pager

## 2018-08-03 DIAGNOSIS — E03.4 HYPOTHYROIDISM DUE TO ACQUIRED ATROPHY OF THYROID: ICD-10-CM

## 2018-08-03 RX ORDER — LEVOTHYROXINE SODIUM 112 UG/1
TABLET ORAL
Qty: 30 TABLET | Refills: 0 | Status: SHIPPED | OUTPATIENT
Start: 2018-08-03 | End: 2018-08-14

## 2018-08-03 NOTE — TELEPHONE ENCOUNTER
Patient due for non fasting office visit- 30 days supply given.  Routing to team to schedule appointment     Isa Greenwood RN  [facility]  [phone]

## 2018-08-03 NOTE — TELEPHONE ENCOUNTER
"Requested Prescriptions   Pending Prescriptions Disp Refills     levothyroxine (SYNTHROID/LEVOTHROID) 112 MCG tablet [Pharmacy Med Name: LEVOTHYROXINE SODIUM 112 MCG TABLET] 90 tablet     Last Written Prescription Date:  6/20/17  Last Fill Quantity: 90,  # refills: 3   Last office visit: 8/1/2017 with prescribing provider:     Future Office Visit:     Sig: TAKE 1 TABLET (112 MCG) BY MOUTH DAILY    Thyroid Protocol Failed    8/3/2018  8:00 AM       Failed - Recent (12 mo) or future (30 days) visit within the authorizing provider's specialty    Patient had office visit in the last 12 months or has a visit in the next 30 days with authorizing provider or within the authorizing provider's specialty.  See \"Patient Info\" tab in inbasket, or \"Choose Columns\" in Meds & Orders section of the refill encounter.           Failed - Normal TSH on file in past 12 months    Recent Labs   Lab Test  06/14/17   0817   TSH  2.53             Passed - Patient is 12 years or older          "

## 2018-08-14 ENCOUNTER — OFFICE VISIT (OUTPATIENT)
Dept: FAMILY MEDICINE | Facility: CLINIC | Age: 68
End: 2018-08-14
Payer: COMMERCIAL

## 2018-08-14 VITALS
HEIGHT: 72 IN | DIASTOLIC BLOOD PRESSURE: 78 MMHG | HEART RATE: 64 BPM | SYSTOLIC BLOOD PRESSURE: 118 MMHG | OXYGEN SATURATION: 98 % | BODY MASS INDEX: 26.55 KG/M2 | TEMPERATURE: 98.6 F | WEIGHT: 196 LBS

## 2018-08-14 DIAGNOSIS — I10 HTN, GOAL BELOW 140/90: ICD-10-CM

## 2018-08-14 DIAGNOSIS — C83.07 SPLENIC MARGINAL ZONE B-CELL LYMPHOMA (H): ICD-10-CM

## 2018-08-14 DIAGNOSIS — E03.4 HYPOTHYROIDISM DUE TO ACQUIRED ATROPHY OF THYROID: Primary | ICD-10-CM

## 2018-08-14 DIAGNOSIS — Z13.1 SCREENING FOR DIABETES MELLITUS: ICD-10-CM

## 2018-08-14 DIAGNOSIS — N30.00 ACUTE CYSTITIS WITHOUT HEMATURIA: ICD-10-CM

## 2018-08-14 DIAGNOSIS — Z12.5 SCREENING FOR PROSTATE CANCER: ICD-10-CM

## 2018-08-14 LAB
ALBUMIN UR-MCNC: NEGATIVE MG/DL
APPEARANCE UR: CLEAR
BILIRUB UR QL STRIP: NEGATIVE
CHOLEST SERPL-MCNC: 151 MG/DL
COLOR UR AUTO: YELLOW
GLUCOSE SERPL-MCNC: 98 MG/DL (ref 70–99)
GLUCOSE UR STRIP-MCNC: NEGATIVE MG/DL
HDLC SERPL-MCNC: 93 MG/DL
HGB UR QL STRIP: NEGATIVE
KETONES UR STRIP-MCNC: NEGATIVE MG/DL
LDLC SERPL CALC-MCNC: 40 MG/DL
LEUKOCYTE ESTERASE UR QL STRIP: NEGATIVE
NITRATE UR QL: NEGATIVE
NONHDLC SERPL-MCNC: 58 MG/DL
PH UR STRIP: 7 PH (ref 5–7)
SOURCE: NORMAL
SP GR UR STRIP: 1.01 (ref 1–1.03)
TRIGL SERPL-MCNC: 89 MG/DL
TSH SERPL DL<=0.005 MIU/L-ACNC: 1.28 MU/L (ref 0.4–4)
UROBILINOGEN UR STRIP-ACNC: 0.2 EU/DL (ref 0.2–1)

## 2018-08-14 PROCEDURE — 82947 ASSAY GLUCOSE BLOOD QUANT: CPT | Performed by: FAMILY MEDICINE

## 2018-08-14 PROCEDURE — 99214 OFFICE O/P EST MOD 30 MIN: CPT | Performed by: FAMILY MEDICINE

## 2018-08-14 PROCEDURE — 81003 URINALYSIS AUTO W/O SCOPE: CPT | Performed by: FAMILY MEDICINE

## 2018-08-14 PROCEDURE — 84443 ASSAY THYROID STIM HORMONE: CPT | Performed by: FAMILY MEDICINE

## 2018-08-14 PROCEDURE — 36415 COLL VENOUS BLD VENIPUNCTURE: CPT | Performed by: FAMILY MEDICINE

## 2018-08-14 PROCEDURE — G0103 PSA SCREENING: HCPCS | Performed by: FAMILY MEDICINE

## 2018-08-14 PROCEDURE — 80061 LIPID PANEL: CPT | Performed by: FAMILY MEDICINE

## 2018-08-14 RX ORDER — AMLODIPINE BESYLATE 5 MG/1
5 TABLET ORAL DAILY
Qty: 90 TABLET | Refills: 3 | Status: SHIPPED | OUTPATIENT
Start: 2018-08-14 | End: 2019-04-29

## 2018-08-14 RX ORDER — LEVOTHYROXINE SODIUM 112 UG/1
112 TABLET ORAL DAILY
Qty: 90 TABLET | Refills: 3 | Status: SHIPPED | OUTPATIENT
Start: 2018-08-14 | End: 2019-09-08

## 2018-08-14 NOTE — PROGRESS NOTES
SUBJECTIVE:   Moose Serrano is a 67 year old male who presents to clinic today for the following health issues:      Hypertension Follow-up      Outpatient blood pressures are being checked at home.  Results are 140/90.    Low Salt Diet: no added salt      Amount of exercise or physical activity: push up and active at work    Problems taking medications regularly: No    Medication side effects: none    Diet: regular (no restrictions)        Medication Followup of levothyroxine    Taking Medication as prescribed: yes    Side Effects:  None    Medication Helping Symptoms:  yes       Problem list and histories reviewed & adjusted, as indicated.  Additional history: as documented    Patient Active Problem List   Diagnosis     CARDIOVASCULAR SCREENING; LDL GOAL LESS THAN 160     Splenic marginal zone b-cell lymphoma (H)     Hypothyroidism due to acquired atrophy of thyroid     HTN, goal below 140/90     Past Surgical History:   Procedure Laterality Date     COLONOSCOPY  7/30/2013    Procedure: COLONOSCOPY;  Colonoscopy;  Surgeon: Dieudonne Harden MD;  Location:  GI     FINGER SURGERY  1996     right knee surgery  Approx 2002     VASECTOMY       WRIST SURGERY  1996       Social History   Substance Use Topics     Smoking status: Former Smoker     Packs/day: 0.50     Years: 34.00     Types: Cigarettes     Smokeless tobacco: Current User     Types: Chew      Comment: 1 can every 3-4 weeks     Alcohol use Yes      Comment: 2 beers per day     Family History   Problem Relation Age of Onset     Unknown/Adopted No family hx of      Asthma No family hx of      C.A.D. No family hx of      Diabetes No family hx of      Hypertension No family hx of      Cerebrovascular Disease No family hx of      Breast Cancer No family hx of      Cancer - colorectal No family hx of      Prostate Cancer No family hx of      Alcohol/Drug No family hx of      Allergies No family hx of      Alzheimer Disease No family hx of      Anesthesia  Reaction No family hx of      Arthritis No family hx of      Blood Disease No family hx of      Cancer No family hx of      Cardiovascular No family hx of      Circulatory No family hx of      Congenital Anomalies No family hx of      Connective Tissue Disorder No family hx of      Depression No family hx of      Endocrine Disease No family hx of      Eye Disorder No family hx of      Genetic Disorder No family hx of      GASTROINTESTINAL DISEASE No family hx of      Genitourinary Problems No family hx of      Gynecology No family hx of      HEART DISEASE No family hx of      Lipids No family hx of      Musculoskeletal Disorder No family hx of      Neurologic Disorder No family hx of      Obesity No family hx of      Osteoperosis No family hx of      Psychotic Disorder No family hx of      Respiratory No family hx of      Thyroid Disease No family hx of      Family History Negative No family hx of      Hearing Loss No family hx of          Current Outpatient Prescriptions   Medication Sig Dispense Refill     amLODIPine (NORVASC) 5 MG tablet Take 1 tablet (5 mg) by mouth daily 90 tablet 3     levothyroxine (SYNTHROID/LEVOTHROID) 112 MCG tablet Take 1 tablet (112 mcg) by mouth daily 90 tablet 3     multivitamin, therapeutic with minerals (MULTI-VITAMIN) TABS Take 1 tablet by mouth daily       [DISCONTINUED] amLODIPine (NORVASC) 5 MG tablet Take 1 tablet (5 mg) by mouth daily 90 tablet 3     [DISCONTINUED] levothyroxine (SYNTHROID/LEVOTHROID) 112 MCG tablet TAKE 1 TABLET (112 MCG) BY MOUTH DAILY 30 tablet 0     [DISCONTINUED] NO ACTIVE MEDICATIONS        Allergies   Allergen Reactions     Penicillins Hives     Rituximab Other (See Comments)     Rigors, nausea     BP Readings from Last 3 Encounters:   08/14/18 118/78   04/16/18 (!) 186/96   10/16/17 (!) 182/107    Wt Readings from Last 3 Encounters:   08/14/18 196 lb (88.9 kg)   04/16/18 218 lb 14.4 oz (99.3 kg)   10/16/17 225 lb 11.2 oz (102.4 kg)                  Labs  "reviewed in EPIC    Reviewed and updated as needed this visit by clinical staff  Tobacco  Allergies  Meds  Med Hx  Surg Hx  Fam Hx  Soc Hx      Reviewed and updated as needed this visit by Provider  Tobacco         ROS:  CONSTITUTIONAL: NEGATIVE for fever, chills, change in weight  ENT/MOUTH: NEGATIVE for ear, mouth and throat problems  RESP: NEGATIVE for significant cough or SOB  CV: NEGATIVE for chest pain, palpitations or peripheral edema    OBJECTIVE:                                                    /78  Pulse 64  Temp 98.6  F (37  C) (Tympanic)  Ht 6' 0.01\" (1.829 m)  Wt 196 lb (88.9 kg)  SpO2 98%  BMI 26.58 kg/m2  Body mass index is 26.58 kg/(m^2).   GENERAL: healthy, alert, well nourished, well hydrated, no distress  HENT: ear canals- normal; TMs- normal; Nose- normal; Mouth- no ulcers, no lesions  NECK: no tenderness, no adenopathy, no asymmetry, no masses, no stiffness; thyroid- normal to palpation  RESP: lungs clear to auscultation - no rales, no rhonchi, no wheezes  CV: regular rates and rhythm, normal S1 S2, no S3 or S4 and no murmur, no click or rub -  ABDOMEN: soft, no tenderness, no  hepatosplenomegaly, no masses, normal bowel sounds  MS: extremities- no gross deformities noted, no edema           ASSESSMENT/PLAN:                                                      1. Hypothyroidism due to acquired atrophy of thyroid  Previously well controlled.  Await the blood work results.  Refill ordered.  - TSH  - levothyroxine (SYNTHROID/LEVOTHROID) 112 MCG tablet; Take 1 tablet (112 mcg) by mouth daily  Dispense: 90 tablet; Refill: 3    2. HTN, goal below 140/90  Hypertension is well managed.  Screening for cholesterol ordered.  Renal functions are normal and were checked in April of this year.  Refill on medication ordered.  - Lipid Profile  - amLODIPine (NORVASC) 5 MG tablet; Take 1 tablet (5 mg) by mouth daily  Dispense: 90 tablet; Refill: 3    3. Screening for diabetes mellitus    - " Glucose    4. Screening for prostate cancer    - Prostate spec antigen screen    5. Acute cystitis without hematuria  Patient has had a recent urinary tract infection.  No active symptoms.  Requested a repeat urine analysis to make sure infection has resolved completely.  - *UA reflex to Microscopic      6. Splenic marginal zone b-cell lymphoma (H)  Management per oncology.  Patient is currently being seen every 6 months.      Follow up with Provider - 1 year     Demetrio Lawson MD  [facility]

## 2018-08-14 NOTE — MR AVS SNAPSHOT
After Visit Summary   8/14/2018    Moose Serrano    MRN: [id number]           Patient Information     Date Of Birth          1950        Visit Information        Provider Department      8/14/2018 8:40 AM Demetrio Lawson MD [facility] [place]        Today's Diagnoses     Hypothyroidism due to acquired atrophy of thyroid    -  1    HTN, goal below 140/90        Screening for diabetes mellitus        Screening for prostate cancer        Acute cystitis without hematuria           Follow-ups after your visit        Follow-up notes from your care team     Return in about 1 year (around 8/14/2019) for Annual Physical Exam.      Your next 10 appointments already scheduled     Oct 15, 2018  7:30 AM CDT   [facility] with  [facility]   [facility])    [address] [place] [address]  [address] [place] [id number] [phone]            Oct 15, 2018  8:00 AM CDT   (Arrive by 7:45 AM)   Return Visit with Carlos Beebe MD   [facility] ([facility])    [address] [place] [address]  [address] [place] [id number] [phone]              Who to contact     If you have questions or need follow up information about today's clinic visit or your schedule please contact [facility] [place] directly at [phone].  Normal or non-critical lab and imaging results will be communicated to you by MyChart, letter or phone within 4 business days after the clinic has received the results. If you do not hear from us within 7 days, please contact the clinic through MyChart or phone. If you have a critical or abnormal lab result, we will notify you by phone as soon as possible.  Submit refill requests through [employer] or call your pharmacy and they will forward the refill request to us. Please allow 3 business days for your refill to be completed.          Additional Information About Your  "Visit        Care EveryWhere ID     This is your Care EveryWhere ID. This could be used by other organizations to access your [place] medical records  [id number]        Your Vitals Were     Pulse Temperature Height Pulse Oximetry BMI (Body Mass Index)       64 98.6  F (37  C) (Tympanic) 6' 0.01\" (1.829 m) 98% 26.58 kg/m2        Blood Pressure from Last 3 Encounters:   08/14/18 118/78   04/16/18 (!) 186/96   10/16/17 (!) 182/107    Weight from Last 3 Encounters:   08/14/18 196 lb (88.9 kg)   04/16/18 218 lb 14.4 oz (99.3 kg)   10/16/17 225 lb 11.2 oz (102.4 kg)              We Performed the Following     *UA reflex to Microscopic     Glucose     Lipid Profile     Prostate spec antigen screen     TSH          Where to get your medicines      These [place] [facility] [place] [address]  [address] [address]     Phone:  [phone]     amLODIPine 5 MG tablet    levothyroxine 112 MCG tablet          Primary Care Provider Office Phone # Fax #    Demetrio Lawson [phone]       [address]  [place] [address]        Equal Access to Services     [facility] AH: Hadii aad ku hadasho Soomaali, waaxda luqadaha, qaybta kaalmada adeegyada, waxay edgar hayhawa laam . So [facility] [phone].    ATENCIÓN: Si habla español, tiene a escalante disposición servicios gratuitos de asistencia lingüística. Llame al [phone].    We comply with applicable federal civil rights laws and [place] laws. We do not discriminate on the basis of race, color, national origin, age, disability, sex, sexual orientation, or gender identity.            Thank you!     Thank you for choosing [facility] [place]  for your care. Our goal is always to provide you with excellent care. Hearing back from our patients is one way we can continue to improve our services. Please take a few minutes to complete the written survey that you may " receive in the mail after your visit with us. Thank you!             Your Updated Medication List - Protect others around you: Learn how to safely use, store and throw away your medicines at www.disposemymeds.org.          This list is accurate as of 8/14/18  8:58 AM.  Always use your most recent med list.                   Brand Name Dispense Instructions for use Diagnosis    amLODIPine 5 MG tablet    NORVASC    90 tablet    Take 1 tablet (5 mg) by mouth daily    HTN, goal below 140/90       levothyroxine 112 MCG tablet    SYNTHROID/LEVOTHROID    90 tablet    Take 1 tablet (112 mcg) by mouth daily    Hypothyroidism due to acquired atrophy of thyroid       Multi-vitamin Tabs tablet      Take 1 tablet by mouth daily

## 2018-08-14 NOTE — LETTER
August 15, 2018      Moose Serrano  [address] [address]        Dear ,    I have reviewed your recent labs. Here are the results:    -Cholesterol levels (LDL,HDL, Triglycerides) are normal.  ADVISE: rechecking in 1 year.  -PSA (prostate specific antigen) test is normal.  This indicates a low likelihood of prostate cancer.  ADVISE: yearly recheck.   -TSH (thyroid stimulating hormone) level is normal which indicates normal thyroid function.  -Glucose (diabetic screening test) is normal.  -Urine is normal.    Resulted Orders   *UA reflex to Microscopic   Result Value Ref Range    Color Urine Yellow     Appearance Urine Clear     Glucose Urine Negative NEG^Negative mg/dL    Bilirubin Urine Negative NEG^Negative    Ketones Urine Negative NEG^Negative mg/dL    Specific Gravity Urine 1.010 1.003 - 1.035    Blood Urine Negative NEG^Negative    pH Urine 7.0 5.0 - 7.0 pH    Protein Albumin Urine Negative NEG^Negative mg/dL    Urobilinogen Urine 0.2 0.2 - 1.0 EU/dL    Nitrite Urine Negative NEG^Negative    Leukocyte Esterase Urine Negative NEG^Negative    Source Midstream Urine    TSH   Result Value Ref Range    TSH 1.28 0.40 - 4.00 mU/L   Lipid Profile   Result Value Ref Range    Cholesterol 151 <200 mg/dL    Triglycerides 89 <150 mg/dL      Comment:      Fasting specimen    HDL Cholesterol 93 >39 mg/dL    LDL Cholesterol Calculated 40 <100 mg/dL      Comment:      Desirable:       <100 mg/dl    Non HDL Cholesterol 58 <130 mg/dL   Prostate spec antigen screen   Result Value Ref Range    PSA 1.39 0 - 4 ug/L      Comment:      Assay Method:  Chemiluminescence using Siemens Vista analyzer   Glucose   Result Value Ref Range    Glucose 98 70 - 99 mg/dL      Comment:      Fasting specimen       If you have any questions or concerns, please call the clinic at the number listed above.       Sincerely,        Demetrio Lawson MD

## 2018-08-15 LAB — PSA SERPL-ACNC: 1.39 UG/L (ref 0–4)

## 2018-09-15 DIAGNOSIS — I10 HTN, GOAL BELOW 140/90: ICD-10-CM

## 2018-09-17 RX ORDER — AMLODIPINE BESYLATE 5 MG/1
TABLET ORAL
Qty: 90 TABLET | Refills: 3 | Status: SHIPPED | OUTPATIENT
Start: 2018-09-17 | End: 2019-09-17

## 2018-09-17 NOTE — TELEPHONE ENCOUNTER
"Requested Prescriptions   Pending Prescriptions Disp Refills     amLODIPine (NORVASC) 5 MG tablet [Pharmacy Med Name: AMLODIPINE BESYLATE 5 MG TABLET]  Last Written Prescription Date:  8/14/18  Last Fill Quantity: 90,  # refills: 3   Last office visit: 8/14/2018 with prescribing provider:  Renny   Future Office Visit:     90 tablet      Sig: Take 1 tablet (5 mg) by mouth daily    Calcium Channel Blockers Protocol  Passed    9/15/2018  8:00 AM       Passed - Blood pressure under 140/90 in past 12 months    BP Readings from Last 3 Encounters:   08/14/18 118/78   04/16/18 (!) 186/96   10/16/17 (!) 182/107                Passed - Recent (12 mo) or future (30 days) visit within the authorizing provider's specialty    Patient had office visit in the last 12 months or has a visit in the next 30 days with authorizing provider or within the authorizing provider's specialty.  See \"Patient Info\" tab in inbasket, or \"Choose Columns\" in Meds & Orders section of the refill encounter.           Passed - Patient is age 18 or older       Passed - Normal serum creatinine on file in past 12 months    Recent Labs   Lab Test  04/16/18   0721   CR  0.98               "

## 2018-10-26 ENCOUNTER — APPOINTMENT (OUTPATIENT)
Dept: LAB | Facility: CLINIC | Age: 68
End: 2018-10-26
Attending: INTERNAL MEDICINE
Payer: COMMERCIAL

## 2018-10-26 ENCOUNTER — ONCOLOGY VISIT (OUTPATIENT)
Dept: ONCOLOGY | Facility: CLINIC | Age: 68
End: 2018-10-26
Attending: INTERNAL MEDICINE
Payer: COMMERCIAL

## 2018-10-26 VITALS
RESPIRATION RATE: 18 BRPM | HEIGHT: 72 IN | OXYGEN SATURATION: 97 % | HEART RATE: 81 BPM | DIASTOLIC BLOOD PRESSURE: 106 MMHG | TEMPERATURE: 98.6 F | WEIGHT: 197.8 LBS | SYSTOLIC BLOOD PRESSURE: 154 MMHG | BODY MASS INDEX: 26.79 KG/M2

## 2018-10-26 DIAGNOSIS — C83.07 SPLENIC MARGINAL ZONE B-CELL LYMPHOMA (H): ICD-10-CM

## 2018-10-26 LAB
ALBUMIN SERPL-MCNC: 3.9 G/DL (ref 3.4–5)
ALP SERPL-CCNC: 58 U/L (ref 40–150)
ALT SERPL W P-5'-P-CCNC: 26 U/L (ref 0–70)
ANION GAP SERPL CALCULATED.3IONS-SCNC: 8 MMOL/L (ref 3–14)
AST SERPL W P-5'-P-CCNC: 19 U/L (ref 0–45)
BASOPHILS # BLD AUTO: 0.1 10E9/L (ref 0–0.2)
BASOPHILS NFR BLD AUTO: 0.9 %
BILIRUB SERPL-MCNC: 0.4 MG/DL (ref 0.2–1.3)
BUN SERPL-MCNC: 17 MG/DL (ref 7–30)
CALCIUM SERPL-MCNC: 8.4 MG/DL (ref 8.5–10.1)
CHLORIDE SERPL-SCNC: 106 MMOL/L (ref 94–109)
CO2 SERPL-SCNC: 23 MMOL/L (ref 20–32)
CREAT SERPL-MCNC: 1.1 MG/DL (ref 0.66–1.25)
DIFFERENTIAL METHOD BLD: ABNORMAL
EOSINOPHIL # BLD AUTO: 0.1 10E9/L (ref 0–0.7)
EOSINOPHIL NFR BLD AUTO: 1.8 %
ERYTHROCYTE [DISTWIDTH] IN BLOOD BY AUTOMATED COUNT: 12.6 % (ref 10–15)
GFR SERPL CREATININE-BSD FRML MDRD: 67 ML/MIN/1.7M2
GLUCOSE SERPL-MCNC: 98 MG/DL (ref 70–99)
HCT VFR BLD AUTO: 42.1 % (ref 40–53)
HGB BLD-MCNC: 14.6 G/DL (ref 13.3–17.7)
IMM GRANULOCYTES # BLD: 0 10E9/L (ref 0–0.4)
IMM GRANULOCYTES NFR BLD: 0.3 %
LDH SERPL L TO P-CCNC: 212 U/L (ref 85–227)
LYMPHOCYTES # BLD AUTO: 1.6 10E9/L (ref 0.8–5.3)
LYMPHOCYTES NFR BLD AUTO: 24 %
MCH RBC QN AUTO: 34.1 PG (ref 26.5–33)
MCHC RBC AUTO-ENTMCNC: 34.7 G/DL (ref 31.5–36.5)
MCV RBC AUTO: 98 FL (ref 78–100)
MONOCYTES # BLD AUTO: 0.7 10E9/L (ref 0–1.3)
MONOCYTES NFR BLD AUTO: 10.9 %
NEUTROPHILS # BLD AUTO: 4.1 10E9/L (ref 1.6–8.3)
NEUTROPHILS NFR BLD AUTO: 62.1 %
NRBC # BLD AUTO: 0 10*3/UL
NRBC BLD AUTO-RTO: 0 /100
PLATELET # BLD AUTO: 259 10E9/L (ref 150–450)
POTASSIUM SERPL-SCNC: 4.3 MMOL/L (ref 3.4–5.3)
PROT SERPL-MCNC: 7.7 G/DL (ref 6.8–8.8)
RBC # BLD AUTO: 4.28 10E12/L (ref 4.4–5.9)
SODIUM SERPL-SCNC: 137 MMOL/L (ref 133–144)
WBC # BLD AUTO: 6.5 10E9/L (ref 4–11)

## 2018-10-26 PROCEDURE — 99213 OFFICE O/P EST LOW 20 MIN: CPT | Mod: ZP | Performed by: INTERNAL MEDICINE

## 2018-10-26 PROCEDURE — 36415 COLL VENOUS BLD VENIPUNCTURE: CPT

## 2018-10-26 PROCEDURE — 83615 LACTATE (LD) (LDH) ENZYME: CPT | Performed by: INTERNAL MEDICINE

## 2018-10-26 PROCEDURE — G0463 HOSPITAL OUTPT CLINIC VISIT: HCPCS | Mod: ZF

## 2018-10-26 PROCEDURE — 80053 COMPREHEN METABOLIC PANEL: CPT | Performed by: INTERNAL MEDICINE

## 2018-10-26 PROCEDURE — 85025 COMPLETE CBC W/AUTO DIFF WBC: CPT | Performed by: INTERNAL MEDICINE

## 2018-10-26 ASSESSMENT — PAIN SCALES - GENERAL: PAINLEVEL: NO PAIN (0)

## 2018-10-26 NOTE — NURSING NOTE
Chief Complaint   Patient presents with     Blood Draw     Labs drawn via  by RN. VS taken.     Kenia Cox RN

## 2018-10-26 NOTE — PROGRESS NOTES
REASON FOR VISIT:  Follow up splenic marginal zone lymphoma.       HISTORY OF PRESENT ILLNESS:  Mr. 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   His wife, Trevor, was not with him today.  To summarize his course, he was noted to have progressive leukocytosis on routine blood work in mid 2013.  PET/CT revealed splenomegaly and lymphadenopathy in the axilla, pretracheal area, and portacaval area.  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  He was initially monitored without treatment.  After developing abdominal pain, weight loss, and night sweats he was started on rituximab in 7/2014.  He had difficulty tolerating rituximab due to severe infusion reaction, so he was treated with CVP alone and then rituximab was successfully added with later cycles.  Otherwise, he tolerated treatment very well and completed 6 cycles at the end of 10/2014.  He is here for ongoing follow up.    Since his last visit, there have been no new issues.  Energy level and appetite are good.  Weight down with dieting.  No fevers, chills, or night sweats.  No headache, vision changes, focal weakness or sensory changes.  No dyspnea, cough, or chest pain.  Normal bowel function.  No urinary complaints.  No bleeding, bruising, or skin rashes.  No pain.  No lumps or bumps.  Managing well.  [place]     REVIEW OF SYSTEMS:  A complete 10-point review of systems was negative other than noted.    MEDICATIONS:  Current Outpatient Prescriptions   Medication     amLODIPine (NORVASC) 5 MG tablet     amLODIPine (NORVASC) 5 MG tablet     levothyroxine (SYNTHROID/LEVOTHROID) 112 MCG tablet     multivitamin, therapeutic with minerals (MULTI-VITAMIN) TABS     [DISCONTINUED] NO ACTIVE MEDICATIONS     No current facility-administered medications for this  "visit.      PHYSICAL EXAMINATION:  BP (!) 154/106 (BP Location: Right arm, Patient Position: Sitting, Cuff Size: Adult Regular)  Pulse 81  Temp 98.6  F (37  C) (Oral)  Resp 18  Ht 1.829 m (6' 0.01\")  Wt 89.7 kg (197 lb 12.8 oz)  SpO2 97%  BMI 26.82 kg/m2  Wt Readings from Last 5 Encounters:   10/26/18 89.7 kg (197 lb 12.8 oz)   08/14/18 88.9 kg (196 lb)   04/16/18 99.3 kg (218 lb 14.4 oz)   10/16/17 102.4 kg (225 lb 11.2 oz)   08/01/17 98.9 kg (218 lb)     BP recheck 165/95  General: Well appearing white male. No acute distress.  HEENT: Sclerae anicteric. No OP lesions, masses, or tonsilar enlargement.  Lungs: Clear bilaterally without wheezing or crackles.  Heart: Regular rate and rhythm without murmurs.  Gastrointestinal: Bowel sounds present, no tenderness to palpation, spleen tip not palpable.  Extremities: No lower extremity edema.  Lymph:  No cervical, clavicular, axillary, epitrochlear, or inguinal lymphadenopathy.  Neuro: Grossly non-focal.  Skin/access: No visible rash. No IV access.  Performance status: ECOG 0    LABORATORY DATA:  Recent Labs   Lab Test  10/26/18   1101  04/16/18   0721  10/16/17   0732   02/24/14   0826   11/25/13   1009  07/24/13   0755   WBC  6.5  5.8  6.1   < >  13.0*   < >  26.7*  15.9*   HGB  14.6  15.6  15.1   < >  12.7*   < >  14.2  13.9   PLT  259  208  207   < >  212   < >  245  227   ANEU  4.1  3.5  3.5   < >  4.3   < >  6.9  5.9   ALYM  1.6  1.5  1.7   < >  7.0*   < >  17.7*  7.5*   RETICABSCT   --    --    --    --   104.4*   --   80.0  59.2    < > = values in this interval not displayed.     Recent Labs   Lab Test  10/26/18   1101  04/16/18   0721  10/16/17   0732   05/18/15   0711   11/17/14   1204  10/27/14   1037   07/18/14   0906   NA  137  141  139   < >  140   < >  141  139   < >  142   POTASSIUM  4.3  4.4  4.7   < >  4.9   < >  4.0  4.3   < >  4.5   CHLORIDE  106  106  106   < >  106   < >  107  107   < >  102   CO2  23  27  27   < >  27   < >  24  26   < >  29 "   BUN  17  11  13   < >  13   < >  8  13   < >  16   CR  1.10  0.98  1.04   < >  0.89   < >  1.05  0.94   < >  0.89   RACHEAL  8.4*  8.6  8.4*   < >  8.6   < >  9.1  9.5   < >  9.0   PHOS   --    --    --    --    --    --    --    --    --   2.9   URIC   --    --    --    --   6.1   --   6.3  5.8   < >  5.0   LDH  212  162  197   < >  194   < >  183  218   < >  464    < > = values in this interval not displayed.     Recent Labs   Lab Test  10/26/18   1101  04/16/18   0721  10/16/17   0732   AST  19  16  15   ALT  26  19  27   ALKPHOS  58  59  66   BILITOTAL  0.4  0.6  0.8       IMPRESSION AND PLAN:  Mr. 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      In regards to his lymphoma, he clinically remains in remission.  Blood counts look great.  There is no splenomegaly or adenopathy on exam.  He has no lymphoma related complications.  We will continue to monitor.       He has no active infectious issues.  He declined a flu shot but will get one soon.  I also suggested he would benefit from Shingrix but he declined at this time.  He will be due for a repeat Pneumovax sometime in 2020.       His blood pressure was slightly elevated again today but has been better at home.  He will continue to work with Dr. Lawson for his general health maintenance including hypertension.        I plan to see him back in about 6 months with labs.  I reminded him to call in the interim if questions, concerns or new issues arise.     Carlos Beebe MD, PhD  Attending Physician, [facility]   of Medicine, [facility]  Division of Hematology, Oncology, and Transplantation  [email or web address] email  [phone] clinic  [phone] office  [phone] pager

## 2018-10-26 NOTE — LETTER
10/26/2018       RE: Moose Serrano  [address] [facility] [address]     Dear Colleague,    Thank you for referring your patient, Moose Serrano, to the [facility]. Please see a copy of my visit note below.    REASON FOR VISIT:  Follow up splenic marginal zone lymphoma.       HISTORY OF PRESENT ILLNESS:  Mr. 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   His wife, Trevor, was not with him today.  To summarize his course, he was noted to have progressive leukocytosis on routine blood work in mid 2013.  PET/CT revealed splenomegaly and lymphadenopathy in the axilla, pretracheal area, and portacaval area.  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  He was initially monitored without treatment.  After developing abdominal pain, weight loss, and night sweats he was started on rituximab in 7/2014.  He had difficulty tolerating rituximab due to severe infusion reaction, so he was treated with CVP alone and then rituximab was successfully added with later cycles.  Otherwise, he tolerated treatment very well and completed 6 cycles at the end of 10/2014.  He is here for ongoing follow up.    Since his last visit, there have been no new issues.  Energy level and appetite are good.  Weight down with dieting.  No fevers, chills, or night sweats.  No headache, vision changes, focal weakness or sensory changes.  No dyspnea, cough, or chest pain.  Normal bowel function.  No urinary complaints.  No bleeding, bruising, or skin rashes.  No pain.  No lumps or bumps.  Managing well.  [place]     REVIEW OF SYSTEMS:  A complete 10-point review of systems was negative other than noted.    MEDICATIONS:  Current Outpatient Prescriptions   Medication     amLODIPine (NORVASC) 5 MG tablet     amLODIPine (NORVASC)  "5 MG tablet     levothyroxine (SYNTHROID/LEVOTHROID) 112 MCG tablet     multivitamin, therapeutic with minerals (MULTI-VITAMIN) TABS     [DISCONTINUED] NO ACTIVE MEDICATIONS     No current facility-administered medications for this visit.      PHYSICAL EXAMINATION:  BP (!) 154/106 (BP Location: Right arm, Patient Position: Sitting, Cuff Size: Adult Regular)  Pulse 81  Temp 98.6  F (37  C) (Oral)  Resp 18  Ht 1.829 m (6' 0.01\")  Wt 89.7 kg (197 lb 12.8 oz)  SpO2 97%  BMI 26.82 kg/m2  Wt Readings from Last 5 Encounters:   10/26/18 89.7 kg (197 lb 12.8 oz)   08/14/18 88.9 kg (196 lb)   04/16/18 99.3 kg (218 lb 14.4 oz)   10/16/17 102.4 kg (225 lb 11.2 oz)   08/01/17 98.9 kg (218 lb)     BP recheck 165/95  General: Well appearing white male. No acute distress.  HEENT: Sclerae anicteric. No OP lesions, masses, or tonsilar enlargement.  Lungs: Clear bilaterally without wheezing or crackles.  Heart: Regular rate and rhythm without murmurs.  Gastrointestinal: Bowel sounds present, no tenderness to palpation, spleen tip not palpable.  Extremities: No lower extremity edema.  Lymph:  No cervical, clavicular, axillary, epitrochlear, or inguinal lymphadenopathy.  Neuro: Grossly non-focal.  Skin/access: No visible rash. No IV access.  Performance status: ECOG 0    LABORATORY DATA:  Recent Labs   Lab Test  10/26/18   1101  04/16/18   0721  10/16/17   0732   02/24/14   0826   11/25/13   1009  07/24/13   0755   WBC  6.5  5.8  6.1   < >  13.0*   < >  26.7*  15.9*   HGB  14.6  15.6  15.1   < >  12.7*   < >  14.2  13.9   PLT  259  208  207   < >  212   < >  245  227   ANEU  4.1  3.5  3.5   < >  4.3   < >  6.9  5.9   ALYM  1.6  1.5  1.7   < >  7.0*   < >  17.7*  7.5*   RETICABSCT   --    --    --    --   104.4*   --   80.0  59.2    < > = values in this interval not displayed.     Recent Labs   Lab Test  10/26/18   1101  04/16/18   0721  10/16/17   0732   05/18/15   0711   11/17/14   1204  10/27/14   1037   07/18/14   0906   NA  " 137  141  139   < >  140   < >  141  139   < >  142   POTASSIUM  4.3  4.4  4.7   < >  4.9   < >  4.0  4.3   < >  4.5   CHLORIDE  106  106  106   < >  106   < >  107  107   < >  102   CO2  23  27  27   < >  27   < >  24  26   < >  29   BUN  17  11  13   < >  13   < >  8  13   < >  16   CR  1.10  0.98  1.04   < >  0.89   < >  1.05  0.94   < >  0.89   RACHEAL  8.4*  8.6  8.4*   < >  8.6   < >  9.1  9.5   < >  9.0   PHOS   --    --    --    --    --    --    --    --    --   2.9   URIC   --    --    --    --   6.1   --   6.3  5.8   < >  5.0   LDH  212  162  197   < >  194   < >  183  218   < >  464    < > = values in this interval not displayed.     Recent Labs   Lab Test  10/26/18   1101  04/16/18   0721  10/16/17   0732   AST  19  16  15   ALT  26  19  27   ALKPHOS  58  59  66   BILITOTAL  0.4  0.6  0.8       IMPRESSION AND PLAN:  Mr. 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      In regards to his lymphoma, he clinically remains in remission.  Blood counts look great.  There is no splenomegaly or adenopathy on exam.  He has no lymphoma related complications.  We will continue to monitor.       He has no active infectious issues.  He declined a flu shot but will get one soon.  I also suggested he would benefit from Shingrix but he declined at this time.  He will be due for a repeat Pneumovax sometime in 2020.       His blood pressure was slightly elevated again today but has been better at home.  He will continue to work with Dr. Lawson for his general health maintenance including hypertension.        I plan to see him back in about 6 months with labs.  I reminded him to call in the interim if questions, concerns or new issues arise.     Carlos Beebe MD, PhD  Attending Physician, [facility]   of Medicine, [facility]  Division of Hematology, Oncology, and Transplantation  [email or web address] email  [phone] [facility]  [phone]  office  [phone] pager

## 2018-10-26 NOTE — NURSING NOTE
"Oncology Rooming Note    October 26, 2018 11:49 AM   Moose Serrano is a 67 year old male who presents for:    Chief Complaint   Patient presents with     Blood Draw     Labs drawn via  by RN. VS taken.     Oncology Clinic Visit     Return visit related to Lymphoma     Initial Vitals: BP (!) 154/106 (BP Location: Right arm, Patient Position: Sitting, Cuff Size: Adult Regular)  Pulse 81  Temp 98.6  F (37  C) (Oral)  Resp 18  Ht 1.829 m (6' 0.01\")  Wt 89.7 kg (197 lb 12.8 oz)  SpO2 97%  BMI 26.82 kg/m2 Estimated body mass index is 26.82 kg/(m^2) as calculated from the following:    Height as of this encounter: 1.829 m (6' 0.01\").    Weight as of this encounter: 89.7 kg (197 lb 12.8 oz). Body surface area is 2.13 meters squared.  No Pain (0) Comment: Data Unavailable   No LMP for male patient.  Allergies reviewed: Yes  Medications reviewed: Yes    Medications: Medication refills not needed today.  Pharmacy name entered into EPIC:    [facility] [place] [address] [facility] - [place], [place] - [address]    Clinical concerns: No new concerns. Provider was notified.    10 minutes for nursing intake (face to face time)     Bridget Huang LPN            "

## 2018-10-26 NOTE — MR AVS SNAPSHOT
After Visit Summary   10/26/2018    Moose Serrano    MRN: [id number]           Patient Information     Date Of Birth          1950        Visit Information        Provider Department      10/26/2018 12:00 PM Carlos Beebe MD [facility]        Today's Diagnoses     Splenic marginal zone b-cell lymphoma (H)           Follow-ups after your visit        Your next 10 appointments already scheduled     Apr 29, 2019  6:45 AM CDT   [facility] with [facility] LAB DRAW   [facility] Draw ([facility])    [address] [place] [address]  [address] [place] [id number] [phone]            Apr 29, 2019  7:15 AM CDT   (Arrive by 7:00 AM)   Return Visit with Carlos Beebe MD   [facility] ([facility])    [address] [place] [address]  [address] [place] [id number] [phone]              Who to contact     If you have questions or need follow up information about today's clinic visit or your schedule please contact [facility] directly at [phone].  Normal or non-critical lab and imaging results will be communicated to you by MyChart, letter or phone within 4 business days after the clinic has received the results. If you do not hear from us within 7 days, please contact the clinic through MyChart or phone. If you have a critical or abnormal lab result, we will notify you by phone as soon as possible.  Submit refill requests through [employer] or call your pharmacy and they will forward the refill request to us. Please allow 3 business days for your refill to be completed.          Additional Information About Your Visit        Care EveryWhere ID     This is your Care EveryWhere ID. This could be used by other organizations to access your [facility] medical records  [id number]        Your Vitals Were     Pulse Temperature Respirations Height Pulse Oximetry BMI (Body Mass  "Index)    81 98.6  F (37  C) (Oral) 18 1.829 m (6' 0.01\") 97% 26.82 kg/m2       Blood Pressure from Last 3 Encounters:   10/26/18 (!) 154/106   08/14/18 118/78   04/16/18 (!) 186/96    Weight from Last 3 Encounters:   10/26/18 89.7 kg (197 lb 12.8 oz)   08/14/18 88.9 kg (196 lb)   04/16/18 99.3 kg (218 lb 14.4 oz)              We Performed the Following     CBC with platelets differential     Comprehensive metabolic panel     Lactate Dehydrogenase        Primary Care Provider Office Phone # Fax #    Demetrio Lawson [phone]       [address]  [place] [address]        Equal Access to Services     [facility]: Hadii aad ku hadasho Sonancy, waaxda luqadaha, qaybta kaalmada tiffany, shirley lama . So [facility] [phone].    ATENCIÓN: Si habla español, tiene a escalante disposición servicios gratuitos de asistencia lingüística. [facility] [phone].    We comply with applicable federal civil rights laws and [place] laws. We do not discriminate on the basis of race, color, national origin, age, disability, sex, sexual orientation, or gender identity.            Thank you!     Thank you for choosing [facility]  for your care. Our goal is always to provide you with excellent care. Hearing back from our patients is one way we can continue to improve our services. Please take a few minutes to complete the written survey that you may receive in the mail after your visit with us. Thank you!             Your Updated Medication List - Protect others around you: Learn how to safely use, store and throw away your medicines at www.disposemymeds.org.          This list is accurate as of 10/26/18 11:59 PM.  Always use your most recent med list.                   Brand Name Dispense Instructions for use Diagnosis    * amLODIPine 5 MG tablet    NORVASC    90 tablet    Take 1 tablet (5 mg) by mouth daily    HTN, goal below 140/90       * amLODIPine 5 MG tablet "    NORVASC    90 tablet    Take 1 tablet (5 mg) by mouth daily    HTN, goal below 140/90       levothyroxine 112 MCG tablet    SYNTHROID/LEVOTHROID    90 tablet    Take 1 tablet (112 mcg) by mouth daily    Hypothyroidism due to acquired atrophy of thyroid       Multi-vitamin Tabs tablet      Take 1 tablet by mouth daily        * Notice:  This list has 2 medication(s) that are the same as other medications prescribed for you. Read the directions carefully, and ask your doctor or other care provider to review them with you.

## 2019-04-29 ENCOUNTER — APPOINTMENT (OUTPATIENT)
Dept: LAB | Facility: CLINIC | Age: 69
End: 2019-04-29
Attending: INTERNAL MEDICINE
Payer: COMMERCIAL

## 2019-04-29 ENCOUNTER — ONCOLOGY VISIT (OUTPATIENT)
Dept: ONCOLOGY | Facility: CLINIC | Age: 69
End: 2019-04-29
Attending: INTERNAL MEDICINE
Payer: COMMERCIAL

## 2019-04-29 VITALS
RESPIRATION RATE: 16 BRPM | BODY MASS INDEX: 27.51 KG/M2 | TEMPERATURE: 98.2 F | HEART RATE: 64 BPM | SYSTOLIC BLOOD PRESSURE: 160 MMHG | WEIGHT: 202.9 LBS | OXYGEN SATURATION: 97 % | DIASTOLIC BLOOD PRESSURE: 94 MMHG

## 2019-04-29 DIAGNOSIS — C83.07 SPLENIC MARGINAL ZONE B-CELL LYMPHOMA (H): ICD-10-CM

## 2019-04-29 LAB
ALBUMIN SERPL-MCNC: 3.8 G/DL (ref 3.4–5)
ALP SERPL-CCNC: 64 U/L (ref 40–150)
ALT SERPL W P-5'-P-CCNC: 22 U/L (ref 0–70)
ANION GAP SERPL CALCULATED.3IONS-SCNC: 5 MMOL/L (ref 3–14)
AST SERPL W P-5'-P-CCNC: 17 U/L (ref 0–45)
BASOPHILS # BLD AUTO: 0.1 10E9/L (ref 0–0.2)
BASOPHILS NFR BLD AUTO: 0.9 %
BILIRUB SERPL-MCNC: 0.6 MG/DL (ref 0.2–1.3)
BUN SERPL-MCNC: 18 MG/DL (ref 7–30)
CALCIUM SERPL-MCNC: 8.8 MG/DL (ref 8.5–10.1)
CHLORIDE SERPL-SCNC: 104 MMOL/L (ref 94–109)
CO2 SERPL-SCNC: 28 MMOL/L (ref 20–32)
CREAT SERPL-MCNC: 1.01 MG/DL (ref 0.66–1.25)
DIFFERENTIAL METHOD BLD: ABNORMAL
EOSINOPHIL # BLD AUTO: 0.1 10E9/L (ref 0–0.7)
EOSINOPHIL NFR BLD AUTO: 2 %
ERYTHROCYTE [DISTWIDTH] IN BLOOD BY AUTOMATED COUNT: 12.2 % (ref 10–15)
GFR SERPL CREATININE-BSD FRML MDRD: 76 ML/MIN/{1.73_M2}
GLUCOSE SERPL-MCNC: 128 MG/DL (ref 70–99)
HCT VFR BLD AUTO: 44 % (ref 40–53)
HGB BLD-MCNC: 14.8 G/DL (ref 13.3–17.7)
IMM GRANULOCYTES # BLD: 0 10E9/L (ref 0–0.4)
IMM GRANULOCYTES NFR BLD: 0.5 %
LDH SERPL L TO P-CCNC: 191 U/L (ref 85–227)
LYMPHOCYTES # BLD AUTO: 1.4 10E9/L (ref 0.8–5.3)
LYMPHOCYTES NFR BLD AUTO: 25.3 %
MCH RBC QN AUTO: 33.9 PG (ref 26.5–33)
MCHC RBC AUTO-ENTMCNC: 33.6 G/DL (ref 31.5–36.5)
MCV RBC AUTO: 101 FL (ref 78–100)
MONOCYTES # BLD AUTO: 0.7 10E9/L (ref 0–1.3)
MONOCYTES NFR BLD AUTO: 12.6 %
NEUTROPHILS # BLD AUTO: 3.3 10E9/L (ref 1.6–8.3)
NEUTROPHILS NFR BLD AUTO: 58.7 %
NRBC # BLD AUTO: 0 10*3/UL
NRBC BLD AUTO-RTO: 0 /100
PLATELET # BLD AUTO: 248 10E9/L (ref 150–450)
POTASSIUM SERPL-SCNC: 4.3 MMOL/L (ref 3.4–5.3)
PROT SERPL-MCNC: 7.3 G/DL (ref 6.8–8.8)
RBC # BLD AUTO: 4.37 10E12/L (ref 4.4–5.9)
SODIUM SERPL-SCNC: 137 MMOL/L (ref 133–144)
WBC # BLD AUTO: 5.6 10E9/L (ref 4–11)

## 2019-04-29 PROCEDURE — 99207: CPT | Mod: ZP | Performed by: INTERNAL MEDICINE

## 2019-04-29 PROCEDURE — 83615 LACTATE (LD) (LDH) ENZYME: CPT | Performed by: INTERNAL MEDICINE

## 2019-04-29 PROCEDURE — 36415 COLL VENOUS BLD VENIPUNCTURE: CPT

## 2019-04-29 PROCEDURE — 80053 COMPREHEN METABOLIC PANEL: CPT | Performed by: INTERNAL MEDICINE

## 2019-04-29 PROCEDURE — 85025 COMPLETE CBC W/AUTO DIFF WBC: CPT | Performed by: INTERNAL MEDICINE

## 2019-04-29 ASSESSMENT — PAIN SCALES - GENERAL: PAINLEVEL: NO PAIN (0)

## 2019-04-29 NOTE — LETTER
4/29/2019       RE: Moose Serrano  [address] [address]     Dear Colleague,    Thank you for referring your patient, Moose Serrano, to the [facility]. Please see a copy of my visit note below.    No show    Again, thank you for allowing me to participate in the care of your patient.      Sincerely,    Carlos Beebe MD

## 2019-04-29 NOTE — NURSING NOTE
Chief Complaint   Patient presents with     Blood Draw     Labs drawn via  by RN in lab. VS taken.      Labs drawn via venipuncture. Vital signs taken. Checked into next appointment.     Jammie Monson RN

## 2019-05-01 ENCOUNTER — CARE COORDINATION (OUTPATIENT)
Dept: ONCOLOGY | Facility: CLINIC | Age: 69
End: 2019-05-01

## 2019-05-01 DIAGNOSIS — C83.07 SPLENIC MARGINAL ZONE B-CELL LYMPHOMA (H): Primary | ICD-10-CM

## 2019-05-01 NOTE — PROGRESS NOTES
Unable to reach Corbin on either phone. Left Mondays appointment without being seen. Had to leave for work, even though appointment time was on time. Labs done, looked good. Left generic message on identified VM stating that everything form Monday looked good. Caller would call back next week with more details/recommendations. Per someone at home number, he was in [place].

## 2019-05-02 NOTE — PROGRESS NOTES
I spoke to patient today; we reviewed Dr Beebe's recommendations. Labs fr 4/29/19 look stable, plan to see MD with labs again in 6 months. Most importantly, pt should plan to stay and see Dr Beebe after labs get drawn. Pt agrees & verbalizes understanding of this plan.

## 2019-09-08 DIAGNOSIS — E03.4 HYPOTHYROIDISM DUE TO ACQUIRED ATROPHY OF THYROID: ICD-10-CM

## 2019-09-09 RX ORDER — LEVOTHYROXINE SODIUM 112 UG/1
112 TABLET ORAL DAILY
Qty: 30 TABLET | Refills: 0 | Status: SHIPPED | OUTPATIENT
Start: 2019-09-09 | End: 2019-09-17

## 2019-09-09 NOTE — TELEPHONE ENCOUNTER
"Requested Prescriptions   Pending Prescriptions Disp Refills     levothyroxine (SYNTHROID/LEVOTHROID) 112 MCG tablet [Pharmacy Med Name: LEVOTHYROXINE SODIUM 112 MCG TABLET] 90 tablet 3     Sig: Take 1 tablet (112 mcg) by mouth daily  Last Written Prescription Date:  8/14/18  Last Fill Quantity: 90,  # refills: 3   Last office visit: 8/14/2018 with prescribing provider:  Renny   Future Office Visit:           Thyroid Protocol Failed - 9/8/2019  8:01 AM        Failed - Recent (12 mo) or future (30 days) visit within the authorizing provider's specialty     Patient had office visit in the last 12 months or has a visit in the next 30 days with authorizing provider or within the authorizing provider's specialty.  See \"Patient Info\" tab in inbasket, or \"Choose Columns\" in Meds & Orders section of the refill encounter.              Failed - Normal TSH on file in past 12 months     Recent Labs   Lab Test 08/14/18  0900   TSH 1.28              Passed - Patient is 12 years or older        Passed - Medication is active on med list          "

## 2019-09-09 NOTE — TELEPHONE ENCOUNTER
30 day supply given.  Patient is due for yearly physical and lab work.  Please call and assist with scheduling appointment prior to next refill   Dominique CHUNG RN   [facility]

## 2019-09-17 ENCOUNTER — OFFICE VISIT (OUTPATIENT)
Dept: FAMILY MEDICINE | Facility: CLINIC | Age: 69
End: 2019-09-17
Payer: COMMERCIAL

## 2019-09-17 VITALS
HEIGHT: 73 IN | RESPIRATION RATE: 16 BRPM | BODY MASS INDEX: 27.96 KG/M2 | WEIGHT: 211 LBS | DIASTOLIC BLOOD PRESSURE: 80 MMHG | SYSTOLIC BLOOD PRESSURE: 136 MMHG | TEMPERATURE: 98.1 F | HEART RATE: 78 BPM | OXYGEN SATURATION: 97 %

## 2019-09-17 DIAGNOSIS — Z23 NEED FOR PROPHYLACTIC VACCINATION AND INOCULATION AGAINST INFLUENZA: ICD-10-CM

## 2019-09-17 DIAGNOSIS — Z12.5 SCREENING FOR PROSTATE CANCER: ICD-10-CM

## 2019-09-17 DIAGNOSIS — E03.4 HYPOTHYROIDISM DUE TO ACQUIRED ATROPHY OF THYROID: ICD-10-CM

## 2019-09-17 DIAGNOSIS — I10 HTN, GOAL BELOW 140/90: Primary | ICD-10-CM

## 2019-09-17 LAB
CHOLEST SERPL-MCNC: 178 MG/DL
HDLC SERPL-MCNC: 63 MG/DL
LDLC SERPL CALC-MCNC: 98 MG/DL
NONHDLC SERPL-MCNC: 115 MG/DL
PSA SERPL-ACNC: 1.68 UG/L (ref 0–4)
TRIGL SERPL-MCNC: 87 MG/DL
TSH SERPL DL<=0.005 MIU/L-ACNC: 2.34 MU/L (ref 0.4–4)

## 2019-09-17 PROCEDURE — 80061 LIPID PANEL: CPT | Performed by: FAMILY MEDICINE

## 2019-09-17 PROCEDURE — 90471 IMMUNIZATION ADMIN: CPT | Performed by: FAMILY MEDICINE

## 2019-09-17 PROCEDURE — 99214 OFFICE O/P EST MOD 30 MIN: CPT | Mod: 25 | Performed by: FAMILY MEDICINE

## 2019-09-17 PROCEDURE — G0103 PSA SCREENING: HCPCS | Performed by: FAMILY MEDICINE

## 2019-09-17 PROCEDURE — 36415 COLL VENOUS BLD VENIPUNCTURE: CPT | Performed by: FAMILY MEDICINE

## 2019-09-17 PROCEDURE — 90662 IIV NO PRSV INCREASED AG IM: CPT | Performed by: FAMILY MEDICINE

## 2019-09-17 PROCEDURE — 84443 ASSAY THYROID STIM HORMONE: CPT | Performed by: FAMILY MEDICINE

## 2019-09-17 RX ORDER — AMLODIPINE BESYLATE 5 MG/1
5 TABLET ORAL DAILY
Qty: 90 TABLET | Refills: 3 | Status: SHIPPED | OUTPATIENT
Start: 2019-09-17 | End: 2020-09-16

## 2019-09-17 RX ORDER — LEVOTHYROXINE SODIUM 112 UG/1
112 TABLET ORAL DAILY
Qty: 90 TABLET | Refills: 3 | Status: SHIPPED | OUTPATIENT
Start: 2019-09-17 | End: 2020-09-16

## 2019-09-17 ASSESSMENT — MIFFLIN-ST. JEOR: SCORE: 1777.79

## 2019-09-17 NOTE — PROGRESS NOTES
Subjective     Moose Serrano is a 68 year old male who presents to clinic today for the following health issues:    HPI   Hypertension Follow-up      Do you check your blood pressure regularly outside of the clinic? No     Are you following a low salt diet? Yes    Are your blood pressures ever more than 140 on the top number (systolic) OR more   than 90 on the bottom number (diastolic), for example 140/90? No      How many servings of fruits and vegetables do you eat daily?  2-3    On average, how many sweetened beverages do you drink each day (soda, juice, sweet tea, etc)?   0    How many days per week do you miss taking your medication? 0        Medication Followup of thyroid medication    Taking Medication as prescribed: yes    Side Effects:  None    Medication Helping Symptoms:  yes     Hypothyroidism Follow-up      Since last visit, patient describes the following symptoms: Weight stable, no hair loss, no skin changes, no constipation, no loose stools      Patient Active Problem List   Diagnosis     CARDIOVASCULAR SCREENING; LDL GOAL LESS THAN 160     Splenic marginal zone b-cell lymphoma (H)     Hypothyroidism due to acquired atrophy of thyroid     HTN, goal below 140/90     Past Surgical History:   Procedure Laterality Date     COLONOSCOPY  7/30/2013    Procedure: COLONOSCOPY;  Colonoscopy;  Surgeon: Dieudonne Harden MD;  Location:  GI     FINGER SURGERY  1996     right knee surgery  Approx 2002     VASECTOMY       WRIST SURGERY  1996       Social History     Tobacco Use     Smoking status: Former Smoker     Packs/day: 0.50     Years: 34.00     Pack years: 17.00     Types: Cigarettes     Smokeless tobacco: Current User     Types: Chew     Tobacco comment: 1 can every 3-4 weeks   Substance Use Topics     Alcohol use: Yes     Comment: 2 beers per day     Family History   Problem Relation Age of Onset     Unknown/Adopted No family hx of      Asthma No family hx of      C.A.D. No family hx of      Diabetes No  family hx of      Hypertension No family hx of      Cerebrovascular Disease No family hx of      Breast Cancer No family hx of      Cancer - colorectal No family hx of      Prostate Cancer No family hx of      Alcohol/Drug No family hx of      Allergies No family hx of      Alzheimer Disease No family hx of      Anesthesia Reaction No family hx of      Arthritis No family hx of      Blood Disease No family hx of      Cancer No family hx of      Cardiovascular No family hx of      Circulatory No family hx of      Congenital Anomalies No family hx of      Connective Tissue Disorder No family hx of      Depression No family hx of      Endocrine Disease No family hx of      Eye Disorder No family hx of      Genetic Disorder No family hx of      Gastrointestinal Disease No family hx of      Genitourinary Problems No family hx of      Gynecology No family hx of      Heart Disease No family hx of      Lipids No family hx of      Musculoskeletal Disorder No family hx of      Neurologic Disorder No family hx of      Obesity No family hx of      Osteoporosis No family hx of      Psychotic Disorder No family hx of      Respiratory No family hx of      Thyroid Disease No family hx of      Family History Negative No family hx of      Hearing Loss No family hx of          Current Outpatient Medications   Medication Sig Dispense Refill     amLODIPine (NORVASC) 5 MG tablet Take 1 tablet (5 mg) by mouth daily 90 tablet 3     levothyroxine (SYNTHROID/LEVOTHROID) 112 MCG tablet Take 1 tablet (112 mcg) by mouth daily 90 tablet 3     multivitamin, therapeutic with minerals (MULTI-VITAMIN) TABS Take 1 tablet by mouth daily       Allergies   Allergen Reactions     Penicillins Hives     Rituximab Nausea     Rigors         Reviewed and updated as needed this visit by Provider         Review of Systems   ROS COMP: CONSTITUTIONAL: NEGATIVE for fever, chills, change in weight  ENT/MOUTH: NEGATIVE for ear, mouth and throat problems  RESP:  "NEGATIVE for significant cough or SOB  CV: NEGATIVE for chest pain, palpitations or peripheral edema      Objective    /80   Pulse 78   Temp 98.1  F (36.7  C) (Tympanic)   Resp 16   Ht 1.849 m (6' 0.8\")   Wt 95.7 kg (211 lb)   SpO2 97%   BMI 27.99 kg/m    Body mass index is 27.99 kg/m .  Physical Exam   GENERAL: healthy, alert and no distress  NECK: no adenopathy, no asymmetry, masses, or scars and thyroid normal to palpation  RESP: lungs clear to auscultation - no rales, rhonchi or wheezes  CV: regular rate and rhythm, normal S1 S2, no S3 or S4, no murmur, click or rub, no peripheral edema and peripheral pulses strong  ABDOMEN: soft, nontender, no hepatosplenomegaly, no masses and bowel sounds normal  MS: no gross musculoskeletal defects noted, no edema            Assessment & Plan     1. HTN, goal below 140/90  Well-controlled.  Medications ordered.  Discussed importance of monitoring blood pressure himself.  If any abnormal readings noted, follow-up.  - amLODIPine (NORVASC) 5 MG tablet; Take 1 tablet (5 mg) by mouth daily  Dispense: 90 tablet; Refill: 3  - Lipid panel reflex to direct LDL Fasting    2. Hypothyroidism due to acquired atrophy of thyroid  Previously well controlled over a year ago.  Repeat labs ordered.    - levothyroxine (SYNTHROID/LEVOTHROID) 112 MCG tablet; Take 1 tablet (112 mcg) by mouth daily  Dispense: 90 tablet; Refill: 3  - TSH    3. Screening for prostate cancer    - Prostate spec antigen screen    4. Need for prophylactic vaccination and inoculation against influenza    - INFLUENZA (HIGH DOSE) 3 VALENT VACCINE [85435]  - Vaccine Administration, Initial [40672]     BMI:   Estimated body mass index is 27.99 kg/m  as calculated from the following:    Height as of this encounter: 1.849 m (6' 0.8\").    Weight as of this encounter: 95.7 kg (211 lb).   Weight management plan: Discussed healthy diet and exercise guidelines            Return in about 1 year (around 9/17/2020) for " Annual Physical Exam.    Demetrio Lawson MD  [facility] [place]

## 2019-10-24 NOTE — PROGRESS NOTES
REASON FOR VISIT:  Follow up splenic marginal zone lymphoma.       HISTORY OF PRESENT ILLNESS:  Mr. Serrano is a 68 year old male with splenic marginal zone lymphoma.   His wife, Trevor, was not with him today.  To summarize his course, he was noted to have progressive leukocytosis on routine blood work in mid 2013.  PET/CT revealed splenomegaly and lymphadenopathy in the axilla, pretracheal area, and portacaval area.  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  He was initially monitored without treatment.  After developing abdominal pain, weight loss, and night sweats he was started on rituximab in 7/2014.  He had difficulty tolerating rituximab due to severe infusion reaction, so he was treated with CVP alone and then rituximab was successfully added with later cycles.  Otherwise, he tolerated treatment very well and completed 6 cycles at the end of 10/2014.  He is here for ongoing follow up.    Since his last visit, there have been no new issues.  Energy level and appetite are good.  Weight stable.  No fevers, chills, or night sweats.  No headache, vision changes, focal weakness or sensory changes.  No dyspnea, cough, or chest pain.  Normal bowel function.  No urinary complaints.  No bleeding, bruising, or skin rashes.  No pain.  No lumps or bumps.  Managing well.  He is off to [employer] for work for the week.     REVIEW OF SYSTEMS:  A complete review of systems was negative other than noted.    MEDICATIONS:  Current Outpatient Medications   Medication     amLODIPine (NORVASC) 5 MG tablet     levothyroxine (SYNTHROID/LEVOTHROID) 112 MCG tablet     multivitamin, therapeutic with minerals (MULTI-VITAMIN) TABS     No current facility-administered medications for this visit.      PHYSICAL EXAMINATION:  BP (!) 163/106   Pulse 69   Temp 97.9  F (36.6  C) (Oral)   Resp 18   Ht  1.829 m (6')   Wt 95 kg (209 lb 6.4 oz)   SpO2 98%   BMI 28.40 kg/m    Wt Readings from Last 5 Encounters:   10/28/19 95 kg (209 lb 6.4 oz)   09/17/19 95.7 kg (211 lb)   04/29/19 92 kg (202 lb 14.4 oz)   10/26/18 89.7 kg (197 lb 12.8 oz)   08/14/18 88.9 kg (196 lb)     BP recheck 165/95  General: Well appearing white male. No acute distress.  HEENT: Sclerae anicteric. No OP lesions, masses, or tonsilar enlargement.  Lungs: Clear bilaterally without wheezing or crackles.  Heart: Regular rate and rhythm without murmurs.  Gastrointestinal: Bowel sounds present, no tenderness to palpation, spleen tip not palpable.  Extremities: No lower extremity edema.  Lymph:  No cervical, clavicular, axillary, epitrochlear, or inguinal lymphadenopathy.  Neuro: Grossly non-focal.  Skin/access: No visible rash. No IV access.  Performance status: ECOG 0    LABORATORY DATA:  Recent Labs   Lab Test 10/28/19  0814 04/29/19  0645 10/26/18  1101  02/24/14  0826  11/25/13  1009 07/24/13  0755   WBC 5.2 5.6 6.5   < > 13.0*   < > 26.7* 15.9*   HGB 14.9 14.8 14.6   < > 12.7*   < > 14.2 13.9    248 259   < > 212   < > 245 227   ANEU 3.1 3.3 4.1   < > 4.3   < > 6.9 5.9   ALYM 1.3 1.4 1.6   < > 7.0*   < > 17.7* 7.5*   RETICABSCT  --   --   --   --  104.4*  --  80.0 59.2    < > = values in this interval not displayed.     Recent Labs   Lab Test 10/28/19  0814 04/29/19  0645 10/26/18  1101  05/18/15  0711  11/17/14  1204 10/27/14  1037  07/18/14  0906    137 137   < > 140   < > 141 139   < > 142   POTASSIUM 4.1 4.3 4.3   < > 4.9   < > 4.0 4.3   < > 4.5   CHLORIDE 107 104 106   < > 106   < > 107 107   < > 102   CO2 27 28 23   < > 27   < > 24 26   < > 29   BUN 15 18 17   < > 13   < > 8 13   < > 16   CR 1.00 1.01 1.10   < > 0.89   < > 1.05 0.94   < > 0.89   RACHEAL 8.8 8.8 8.4*   < > 8.6   < > 9.1 9.5   < > 9.0   PHOS  --   --   --   --   --   --   --   --   --  2.9   URIC  --   --   --   --  6.1  --  6.3 5.8   < > 5.0    191 212   <  > 194   < > 183 218   < > 464    < > = values in this interval not displayed.     Recent Labs   Lab Test 10/28/19  0814 04/29/19  0645 10/26/18  1101   AST 17 17 19   ALT 26 22 26   ALKPHOS 60 64 58   BILITOTAL 0.6 0.6 0.4       IMPRESSION AND PLAN:  Mr. Serrano is a 68 year old male with splenic marginal zone lymphoma, here for ongoing management.      In regards to his lymphoma, he clinically remains in remission.  Blood counts look great.  There is no splenomegaly or adenopathy on exam.  He has no lymphoma related complications.  We will continue to monitor.       He has no active infectious issues.  He already got a flu shot.  I recommended Shingrix but he declined at this time and will consider it in the future.  He will be due for a repeat Pneumovax sometime in 2020.       His blood pressure was slightly elevated again today but has been better at home, he'll recheck later this week and let Dr. Lawson know if it remains elevated.  He will continue to work with Dr. Lawson for his general health maintenance including hypertension.        I plan to see him back in about 6 months with labs.  I reminded him to contact us in the interim if questions, concerns or new issues arise.     Carlos Beebe MD, PhD  Attending Physician, [facility]   of Medicine, [facility]  Division of Hematology, Oncology, and Transplantation  [email or web address] email  [phone] clinic  [phone] pager

## 2019-10-28 ENCOUNTER — APPOINTMENT (OUTPATIENT)
Dept: LAB | Facility: CLINIC | Age: 69
End: 2019-10-28
Attending: INTERNAL MEDICINE
Payer: COMMERCIAL

## 2019-10-28 ENCOUNTER — ONCOLOGY VISIT (OUTPATIENT)
Dept: ONCOLOGY | Facility: CLINIC | Age: 69
End: 2019-10-28
Attending: INTERNAL MEDICINE
Payer: COMMERCIAL

## 2019-10-28 VITALS
SYSTOLIC BLOOD PRESSURE: 163 MMHG | DIASTOLIC BLOOD PRESSURE: 106 MMHG | WEIGHT: 209.4 LBS | HEIGHT: 72 IN | OXYGEN SATURATION: 98 % | RESPIRATION RATE: 18 BRPM | BODY MASS INDEX: 28.36 KG/M2 | TEMPERATURE: 97.9 F | HEART RATE: 69 BPM

## 2019-10-28 DIAGNOSIS — C83.07 SPLENIC MARGINAL ZONE B-CELL LYMPHOMA (H): Primary | ICD-10-CM

## 2019-10-28 LAB
ALBUMIN SERPL-MCNC: 4 G/DL (ref 3.4–5)
ALP SERPL-CCNC: 60 U/L (ref 40–150)
ALT SERPL W P-5'-P-CCNC: 26 U/L (ref 0–70)
ANION GAP SERPL CALCULATED.3IONS-SCNC: 3 MMOL/L (ref 3–14)
AST SERPL W P-5'-P-CCNC: 17 U/L (ref 0–45)
BASOPHILS # BLD AUTO: 0.1 10E9/L (ref 0–0.2)
BASOPHILS NFR BLD AUTO: 1 %
BILIRUB SERPL-MCNC: 0.6 MG/DL (ref 0.2–1.3)
BUN SERPL-MCNC: 15 MG/DL (ref 7–30)
CALCIUM SERPL-MCNC: 8.8 MG/DL (ref 8.5–10.1)
CHLORIDE SERPL-SCNC: 107 MMOL/L (ref 94–109)
CO2 SERPL-SCNC: 27 MMOL/L (ref 20–32)
CREAT SERPL-MCNC: 1 MG/DL (ref 0.66–1.25)
DIFFERENTIAL METHOD BLD: ABNORMAL
EOSINOPHIL # BLD AUTO: 0.1 10E9/L (ref 0–0.7)
EOSINOPHIL NFR BLD AUTO: 1.7 %
ERYTHROCYTE [DISTWIDTH] IN BLOOD BY AUTOMATED COUNT: 12.4 % (ref 10–15)
GFR SERPL CREATININE-BSD FRML MDRD: 77 ML/MIN/{1.73_M2}
GLUCOSE SERPL-MCNC: 99 MG/DL (ref 70–99)
HCT VFR BLD AUTO: 45 % (ref 40–53)
HGB BLD-MCNC: 14.9 G/DL (ref 13.3–17.7)
IMM GRANULOCYTES # BLD: 0 10E9/L (ref 0–0.4)
IMM GRANULOCYTES NFR BLD: 0.2 %
LDH SERPL L TO P-CCNC: 191 U/L (ref 85–227)
LYMPHOCYTES # BLD AUTO: 1.3 10E9/L (ref 0.8–5.3)
LYMPHOCYTES NFR BLD AUTO: 24.9 %
MCH RBC QN AUTO: 33.4 PG (ref 26.5–33)
MCHC RBC AUTO-ENTMCNC: 33.1 G/DL (ref 31.5–36.5)
MCV RBC AUTO: 101 FL (ref 78–100)
MONOCYTES # BLD AUTO: 0.6 10E9/L (ref 0–1.3)
MONOCYTES NFR BLD AUTO: 12.3 %
NEUTROPHILS # BLD AUTO: 3.1 10E9/L (ref 1.6–8.3)
NEUTROPHILS NFR BLD AUTO: 59.9 %
NRBC # BLD AUTO: 0 10*3/UL
NRBC BLD AUTO-RTO: 0 /100
PLATELET # BLD AUTO: 209 10E9/L (ref 150–450)
POTASSIUM SERPL-SCNC: 4.1 MMOL/L (ref 3.4–5.3)
PROT SERPL-MCNC: 7.5 G/DL (ref 6.8–8.8)
RBC # BLD AUTO: 4.46 10E12/L (ref 4.4–5.9)
SODIUM SERPL-SCNC: 137 MMOL/L (ref 133–144)
WBC # BLD AUTO: 5.2 10E9/L (ref 4–11)

## 2019-10-28 PROCEDURE — 85025 COMPLETE CBC W/AUTO DIFF WBC: CPT | Performed by: INTERNAL MEDICINE

## 2019-10-28 PROCEDURE — 99214 OFFICE O/P EST MOD 30 MIN: CPT | Mod: ZP | Performed by: INTERNAL MEDICINE

## 2019-10-28 PROCEDURE — 80053 COMPREHEN METABOLIC PANEL: CPT | Performed by: INTERNAL MEDICINE

## 2019-10-28 PROCEDURE — G0463 HOSPITAL OUTPT CLINIC VISIT: HCPCS | Mod: ZF

## 2019-10-28 PROCEDURE — 36415 COLL VENOUS BLD VENIPUNCTURE: CPT

## 2019-10-28 PROCEDURE — 83615 LACTATE (LD) (LDH) ENZYME: CPT | Performed by: INTERNAL MEDICINE

## 2019-10-28 ASSESSMENT — PAIN SCALES - GENERAL: PAINLEVEL: NO PAIN (0)

## 2019-10-28 ASSESSMENT — MIFFLIN-ST. JEOR: SCORE: 1757.83

## 2019-10-28 NOTE — NURSING NOTE
Oncology Rooming Note    October 28, 2019 8:34 AM   Moose Serrano is a 68 year old male who presents for:    Chief Complaint   Patient presents with     Blood Draw     VPT blood draw and vitals     Oncology Clinic Visit     Return; Lymphoma     Initial Vitals: BP (!) 163/106   Pulse 69   Temp 97.9  F (36.6  C) (Oral)   Resp 18   Ht 1.829 m (6')   Wt 95 kg (209 lb 6.4 oz)   SpO2 98%   BMI 28.40 kg/m   Estimated body mass index is 28.4 kg/m  as calculated from the following:    Height as of this encounter: 1.829 m (6').    Weight as of this encounter: 95 kg (209 lb 6.4 oz). Body surface area is 2.2 meters squared.  No Pain (0) Comment: Data Unavailable   No LMP for male patient.  Allergies reviewed: Yes  Medications reviewed: Yes    Medications: Medication refills not needed today.  Pharmacy name entered into EPIC:    [facility] [place] [address] [facility] - [place], [place] - [address]    Clinical concerns: No Need concerns. Pt states he needs a knee replacement but he wants to make sure its okay to have the surgery. No other concerns.      Dorothy Machado, [facility]

## 2019-10-28 NOTE — NURSING NOTE
Chief Complaint   Patient presents with     Blood Draw     VPT blood draw and vitals     Venipuncture labs drawn from left arm. Patient has high b.p., he did take blood pressure medication this morning.

## 2019-10-28 NOTE — LETTER
10/28/2019       RE: Moose Serrano  [address] [address]     Dear Colleague,    Thank you for referring your patient, Moose Serrano, to the [facility]. Please see a copy of my visit note below.    REASON FOR VISIT:  Follow up splenic marginal zone lymphoma.       HISTORY OF PRESENT ILLNESS:  Mr. Serrano is a 68 year old male with splenic marginal zone lymphoma.   His wife, Trevor, was not with him today.  To summarize his course, he was noted to have progressive leukocytosis on routine blood work in mid 2013.  PET/CT revealed splenomegaly and lymphadenopathy in the axilla, pretracheal area, and portacaval area.  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  He was initially monitored without treatment.  After developing abdominal pain, weight loss, and night sweats he was started on rituximab in 7/2014.  He had difficulty tolerating rituximab due to severe infusion reaction, so he was treated with CVP alone and then rituximab was successfully added with later cycles.  Otherwise, he tolerated treatment very well and completed 6 cycles at the end of 10/2014.  He is here for ongoing follow up.    Since his last visit, there have been no new issues.  Energy level and appetite are good.  Weight stable.  No fevers, chills, or night sweats.  No headache, vision changes, focal weakness or sensory changes.  No dyspnea, cough, or chest pain.  Normal bowel function.  No urinary complaints.  No bleeding, bruising, or skin rashes.  No pain.  No lumps or bumps.  Managing well.  He is off to [employer] for work for the week.     REVIEW OF SYSTEMS:  A complete review of systems was negative other than noted.    MEDICATIONS:  Current Outpatient Medications   Medication     amLODIPine (NORVASC) 5 MG tablet     levothyroxine (SYNTHROID/LEVOTHROID) 112 MCG tablet      multivitamin, therapeutic with minerals (MULTI-VITAMIN) TABS     No current facility-administered medications for this visit.      PHYSICAL EXAMINATION:  BP (!) 163/106   Pulse 69   Temp 97.9  F (36.6  C) (Oral)   Resp 18   Ht 1.829 m (6')   Wt 95 kg (209 lb 6.4 oz)   SpO2 98%   BMI 28.40 kg/m     Wt Readings from Last 5 Encounters:   10/28/19 95 kg (209 lb 6.4 oz)   09/17/19 95.7 kg (211 lb)   04/29/19 92 kg (202 lb 14.4 oz)   10/26/18 89.7 kg (197 lb 12.8 oz)   08/14/18 88.9 kg (196 lb)     BP recheck 165/95  General: Well appearing white male. No acute distress.  HEENT: Sclerae anicteric. No OP lesions, masses, or tonsilar enlargement.  Lungs: Clear bilaterally without wheezing or crackles.  Heart: Regular rate and rhythm without murmurs.  Gastrointestinal: Bowel sounds present, no tenderness to palpation, spleen tip not palpable.  Extremities: No lower extremity edema.  Lymph:  No cervical, clavicular, axillary, epitrochlear, or inguinal lymphadenopathy.  Neuro: Grossly non-focal.  Skin/access: No visible rash. No IV access.  Performance status: ECOG 0    LABORATORY DATA:  Recent Labs   Lab Test 10/28/19  0814 04/29/19  0645 10/26/18  1101  02/24/14  0826  11/25/13  1009 07/24/13  0755   WBC 5.2 5.6 6.5   < > 13.0*   < > 26.7* 15.9*   HGB 14.9 14.8 14.6   < > 12.7*   < > 14.2 13.9    248 259   < > 212   < > 245 227   ANEU 3.1 3.3 4.1   < > 4.3   < > 6.9 5.9   ALYM 1.3 1.4 1.6   < > 7.0*   < > 17.7* 7.5*   RETICABSCT  --   --   --   --  104.4*  --  80.0 59.2    < > = values in this interval not displayed.     Recent Labs   Lab Test 10/28/19  0814 04/29/19  0645 10/26/18  1101  05/18/15  0711  11/17/14  1204 10/27/14  1037  07/18/14  0906    137 137   < > 140   < > 141 139   < > 142   POTASSIUM 4.1 4.3 4.3   < > 4.9   < > 4.0 4.3   < > 4.5   CHLORIDE 107 104 106   < > 106   < > 107 107   < > 102   CO2 27 28 23   < > 27   < > 24 26   < > 29   BUN 15 18 17   < > 13   < > 8 13   < > 16   CR  1.00 1.01 1.10   < > 0.89   < > 1.05 0.94   < > 0.89   RACHEAL 8.8 8.8 8.4*   < > 8.6   < > 9.1 9.5   < > 9.0   PHOS  --   --   --   --   --   --   --   --   --  2.9   URIC  --   --   --   --  6.1  --  6.3 5.8   < > 5.0    191 212   < > 194   < > 183 218   < > 464    < > = values in this interval not displayed.     Recent Labs   Lab Test 10/28/19  0814 04/29/19  0645 10/26/18  1101   AST 17 17 19   ALT 26 22 26   ALKPHOS 60 64 58   BILITOTAL 0.6 0.6 0.4       IMPRESSION AND PLAN:  Mr. Serrano is a 68 year old male with splenic marginal zone lymphoma, here for ongoing management.      In regards to his lymphoma, he clinically remains in remission.  Blood counts look great.  There is no splenomegaly or adenopathy on exam.  He has no lymphoma related complications.  We will continue to monitor.       He has no active infectious issues.  He already got a flu shot.  I recommended Shingrix but he declined at this time and will consider it in the future.  He will be due for a repeat Pneumovax sometime in 2020.       His blood pressure was slightly elevated again today but has been better at home, he'll recheck later this week and let Dr. Lawson know if it remains elevated.  He will continue to work with Dr. Lawson for his general health maintenance including hypertension.        I plan to see him back in about 6 months with labs.  I reminded him to contact us in the interim if questions, concerns or new issues arise.     Carlos Beebe MD, PhD  Attending Physician, [facility]   of Medicine, [facility]  Division of Hematology, Oncology, and Transplantation  [email or web address] email  [phone] [facility]  [phone] pager

## 2020-02-05 ENCOUNTER — DOCUMENTATION ONLY (OUTPATIENT)
Dept: FAMILY MEDICINE | Facility: CLINIC | Age: 70
End: 2020-02-05

## 2020-02-05 NOTE — PROGRESS NOTES
Patient is overdue for blood pressure recheck and annual examination.  Please have him schedule an appointment with me in the next few weeks.    Demetrio Lawson MD  [facility], [place]

## 2020-02-05 NOTE — LETTER
February 19, 2020      Moose Serrano  [address] [address]        Dear Moose,    I care about your health and have reviewed your health plan. I have reviewed your medical conditions, medication list, and lab results and am making recommendations based on this review, to better manage your health.    You are in particular need of attention regarding:  -High Blood Pressure    I am recommending that you:  -Schedule an appointment    Here is a list of Health Maintenance topics that are due now or due soon:  Health Maintenance Due   Topic Date Due     Zoster (Shingles) Vaccine (1 of 2) 11/17/2000     Annual Wellness Visit  05/18/2016     Discuss Advance Care Planning  12/12/2018     FALL RISK ASSESSMENT  08/14/2019     PHQ-2  01/01/2020       Please call us at [phone] (or use [employer]) to address the above recommendations.     Thank you for trusting [facility] and we appreciate the opportunity to serve you.  We look forward to supporting your healthcare needs in the future.    Healthy Regards,    Demetrio Lawson MD

## 2020-02-05 NOTE — PROGRESS NOTES
Yolanda Blevins contacted Moose on 02/05/20 and left a message. If patient calls back please schedule appointment as soon as possible for a BP check.      .Yolanda WILLSON    [facility] [place]

## 2020-02-12 NOTE — PROGRESS NOTES
Yolanda Blevins contacted Moose on 02/12/20 and left a message. If patient calls back please schedule appointment as soon as possible for a BP check.        .Yolanda WILLSON    [facility] [place]

## 2020-02-19 NOTE — PROGRESS NOTES
Yolanda Blevins contacted Moose on 02/19/20 and left a message. If patient calls back please schedule appointment as soon as possible for a BP check. Letter sent.        .Yolanda WILLSON    [facility] [place]

## 2020-04-28 NOTE — PROGRESS NOTES
REASON FOR VISIT:  Follow up splenic marginal zone lymphoma.       HISTORY OF PRESENT ILLNESS:  Mr. 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  To summarize his course, he was noted to have progressive leukocytosis on routine blood work in mid 2013.  PET/CT revealed splenomegaly and lymphadenopathy in the axilla, pretracheal area, and portacaval area.  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  He was initially monitored without treatment.  After developing abdominal pain, weight loss, and night sweats he was started on rituximab in 7/2014.  He had difficulty tolerating rituximab due to severe infusion reaction, so he was treated with CVP alone and then rituximab was successfully added with later cycles.  Otherwise, he tolerated treatment very well and completed 6 cycles at the end of 10/2014.  Telephone visit for ongoing follow up.    His wife, Trevor, was not with him today.  Since his last visit, he was on [place] and thinks he may have had COVID-19 and fortunately has completely recovered.  [place]  Energy level and appetite are good.  Weight stable.  No fevers, chills, or night sweats.  No headache, vision changes, focal weakness or sensory changes.  No dyspnea, cough, or chest pain.  Normal bowel function.  No urinary complaints.  No bleeding, bruising, or skin rashes.  No pain.  No lumps or bumps.  Managing well.  He is off to [employer] for work for the week.     REVIEW OF SYSTEMS:  A complete review of systems was negative other than noted.    MEDICATIONS:  Current Outpatient Medications   Medication     amLODIPine (NORVASC) 5 MG tablet     levothyroxine (SYNTHROID/LEVOTHROID) 112 MCG tablet     multivitamin, therapeutic with minerals (MULTI-VITAMIN) TABS     No current  facility-administered medications for this visit.      PHYSICAL EXAMINATION:  There were no vitals taken for this visit.  Wt Readings from Last 5 Encounters:   04/29/20 99.3 kg (218 lb 14.4 oz)   10/28/19 95 kg (209 lb 6.4 oz)   09/17/19 95.7 kg (211 lb)   04/29/19 92 kg (202 lb 14.4 oz)   10/26/18 89.7 kg (197 lb 12.8 oz)     LABORATORY DATA:  Recent Labs   Lab Test 04/29/20  0809 10/28/19  0814 04/29/19  0645  02/24/14  0826  11/25/13  1009 07/24/13  0755   WBC 4.8 5.2 5.6   < > 13.0*   < > 26.7* 15.9*   HGB 14.3 14.9 14.8   < > 12.7*   < > 14.2 13.9    209 248   < > 212   < > 245 227   ANEU 2.9 3.1 3.3   < > 4.3   < > 6.9 5.9   ALYM 1.1 1.3 1.4   < > 7.0*   < > 17.7* 7.5*   RETICABSCT  --   --   --   --  104.4*  --  80.0 59.2    < > = values in this interval not displayed.     Recent Labs   Lab Test 04/29/20  0809 10/28/19  0814 04/29/19  0645  05/18/15  0711  11/17/14  1204 10/27/14  1037  07/18/14  0906    137 137   < > 140   < > 141 139   < > 142   POTASSIUM 4.5 4.1 4.3   < > 4.9   < > 4.0 4.3   < > 4.5   CHLORIDE 110* 107 104   < > 106   < > 107 107   < > 102   CO2 29 27 28   < > 27   < > 24 26   < > 29   BUN 14 15 18   < > 13   < > 8 13   < > 16   CR 0.94 1.00 1.01   < > 0.89   < > 1.05 0.94   < > 0.89   RACHEAL 8.4* 8.8 8.8   < > 8.6   < > 9.1 9.5   < > 9.0   PHOS  --   --   --   --   --   --   --   --   --  2.9   URIC  --   --   --   --  6.1  --  6.3 5.8   < > 5.0    191 191   < > 194   < > 183 218   < > 464    < > = values in this interval not displayed.     Recent Labs   Lab Test 04/29/20  0809 10/28/19  0814 04/29/19  0645   AST 18 17 17   ALT 30 26 22   ALKPHOS 109 60 64   BILITOTAL 0.6 0.6 0.6     IMPRESSION AND PLAN:  Mr. 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      In regards to his lymphoma, he clinically remains in remission.  Blood counts look good.  He has no lymphoma related complications.  We will continue to monitor.       He has no active infectious  issues.  He already got a flu shot.  He is due for Shingrix and repeat Pneumovax but we will wait until after the COVID-19 situation has improved.       He will continue to work with Dr. Lawson for his general health maintenance including hypertension.        I plan to see him again in about 6 months with labs.  I reminded him to contact us in the interim if questions, concerns or new issues arise.     Call start time: 9:16 AM  Call end time: 9:27 AM  Call duration: 11 minutes    Carlos Beebe MD, PhD  Attending Physician, [facility]   of Medicine, [facility]  Division of Hematology, Oncology, and Transplantation  [email or web address] email  [phone] clinic  [phone] pager

## 2020-04-29 ENCOUNTER — VIRTUAL VISIT (OUTPATIENT)
Dept: ONCOLOGY | Facility: CLINIC | Age: 70
End: 2020-04-29
Attending: INTERNAL MEDICINE
Payer: COMMERCIAL

## 2020-04-29 VITALS
BODY MASS INDEX: 29.69 KG/M2 | OXYGEN SATURATION: 98 % | WEIGHT: 218.9 LBS | RESPIRATION RATE: 18 BRPM | HEART RATE: 65 BPM | TEMPERATURE: 98 F | SYSTOLIC BLOOD PRESSURE: 182 MMHG | DIASTOLIC BLOOD PRESSURE: 109 MMHG

## 2020-04-29 DIAGNOSIS — C83.07 SPLENIC MARGINAL ZONE B-CELL LYMPHOMA (H): Primary | ICD-10-CM

## 2020-04-29 DIAGNOSIS — C83.07 SPLENIC MARGINAL ZONE B-CELL LYMPHOMA (H): ICD-10-CM

## 2020-04-29 LAB
ALBUMIN SERPL-MCNC: 3.6 G/DL (ref 3.4–5)
ALP SERPL-CCNC: 109 U/L (ref 40–150)
ALT SERPL W P-5'-P-CCNC: 30 U/L (ref 0–70)
ANION GAP SERPL CALCULATED.3IONS-SCNC: 2 MMOL/L (ref 3–14)
AST SERPL W P-5'-P-CCNC: 18 U/L (ref 0–45)
BASOPHILS # BLD AUTO: 0 10E9/L (ref 0–0.2)
BASOPHILS NFR BLD AUTO: 0.8 %
BILIRUB SERPL-MCNC: 0.6 MG/DL (ref 0.2–1.3)
BUN SERPL-MCNC: 14 MG/DL (ref 7–30)
CALCIUM SERPL-MCNC: 8.4 MG/DL (ref 8.5–10.1)
CHLORIDE SERPL-SCNC: 110 MMOL/L (ref 94–109)
CO2 SERPL-SCNC: 29 MMOL/L (ref 20–32)
CREAT SERPL-MCNC: 0.94 MG/DL (ref 0.66–1.25)
DIFFERENTIAL METHOD BLD: ABNORMAL
EOSINOPHIL # BLD AUTO: 0.1 10E9/L (ref 0–0.7)
EOSINOPHIL NFR BLD AUTO: 2.5 %
ERYTHROCYTE [DISTWIDTH] IN BLOOD BY AUTOMATED COUNT: 11.9 % (ref 10–15)
GFR SERPL CREATININE-BSD FRML MDRD: 82 ML/MIN/{1.73_M2}
GLUCOSE SERPL-MCNC: 114 MG/DL (ref 70–99)
HCT VFR BLD AUTO: 42.6 % (ref 40–53)
HGB BLD-MCNC: 14.3 G/DL (ref 13.3–17.7)
IMM GRANULOCYTES # BLD: 0 10E9/L (ref 0–0.4)
IMM GRANULOCYTES NFR BLD: 0.2 %
LDH SERPL L TO P-CCNC: 182 U/L (ref 85–227)
LYMPHOCYTES # BLD AUTO: 1.1 10E9/L (ref 0.8–5.3)
LYMPHOCYTES NFR BLD AUTO: 22.8 %
MCH RBC QN AUTO: 33.7 PG (ref 26.5–33)
MCHC RBC AUTO-ENTMCNC: 33.6 G/DL (ref 31.5–36.5)
MCV RBC AUTO: 101 FL (ref 78–100)
MONOCYTES # BLD AUTO: 0.6 10E9/L (ref 0–1.3)
MONOCYTES NFR BLD AUTO: 13.4 %
NEUTROPHILS # BLD AUTO: 2.9 10E9/L (ref 1.6–8.3)
NEUTROPHILS NFR BLD AUTO: 60.3 %
NRBC # BLD AUTO: 0 10*3/UL
NRBC BLD AUTO-RTO: 0 /100
PLATELET # BLD AUTO: 220 10E9/L (ref 150–450)
POTASSIUM SERPL-SCNC: 4.5 MMOL/L (ref 3.4–5.3)
PROT SERPL-MCNC: 7.1 G/DL (ref 6.8–8.8)
RBC # BLD AUTO: 4.24 10E12/L (ref 4.4–5.9)
SODIUM SERPL-SCNC: 141 MMOL/L (ref 133–144)
WBC # BLD AUTO: 4.8 10E9/L (ref 4–11)

## 2020-04-29 PROCEDURE — 99213 OFFICE O/P EST LOW 20 MIN: CPT | Mod: 95 | Performed by: INTERNAL MEDICINE

## 2020-04-29 PROCEDURE — 36415 COLL VENOUS BLD VENIPUNCTURE: CPT

## 2020-04-29 PROCEDURE — 40000114 ZZH STATISTIC NO CHARGE CLINIC VISIT

## 2020-04-29 PROCEDURE — 83615 LACTATE (LD) (LDH) ENZYME: CPT | Performed by: INTERNAL MEDICINE

## 2020-04-29 PROCEDURE — 80053 COMPREHEN METABOLIC PANEL: CPT | Performed by: INTERNAL MEDICINE

## 2020-04-29 PROCEDURE — 85025 COMPLETE CBC W/AUTO DIFF WBC: CPT | Performed by: INTERNAL MEDICINE

## 2020-04-29 ASSESSMENT — PAIN SCALES - GENERAL: PAINLEVEL: NO PAIN (0)

## 2020-04-29 NOTE — PROGRESS NOTES
"Moose Serrano is a 69 year old male who is being evaluated via a billable telephone visit.      The patient has been notified of following:     \"This telephone visit will be conducted via a call between you and your physician/provider. We have found that certain health care needs can be provided without the need for a physical exam.  This service lets us provide the care you need with a short phone conversation.  If a prescription is necessary we can send it directly to your pharmacy.  If lab work is needed we can place an order for that and you can then stop by our lab to have the test done at a later time.    Telephone visits are billed at different rates depending on your insurance coverage. During this emergency period, for some insurers they may be billed the same as an in-person visit.  Please reach out to your insurance provider with any questions.    If during the course of the call the physician/provider feels a telephone visit is not appropriate, you will not be charged for this service.\"    *Pt has no new needs or concerns.  Leora Valadez CMA on 4/29/2020 at 9:12 AM  Rooming staff = Leora WEBB, [phone]    Patient has given verbal consent for Telephone visit?  Yes    How would you like to obtain your AVS? MyChart    "

## 2020-04-29 NOTE — LETTER
4/29/2020       RE: Moose Serrano  [address] [facility] [address]     Dear Colleague,    Thank you for referring your patient, Moose Serrano, to the [facility]. Please see a copy of my visit note below.    REASON FOR VISIT:  Follow up splenic marginal zone lymphoma.       HISTORY OF PRESENT ILLNESS:  Mr. 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  To summarize his course, he was noted to have progressive leukocytosis on routine blood work in mid 2013.  PET/CT revealed splenomegaly and lymphadenopathy in the axilla, pretracheal area, and portacaval area.  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  He was initially monitored without treatment.  After developing abdominal pain, weight loss, and night sweats he was started on rituximab in 7/2014.  He had difficulty tolerating rituximab due to severe infusion reaction, so he was treated with CVP alone and then rituximab was successfully added with later cycles.  Otherwise, he tolerated treatment very well and completed 6 cycles at the end of 10/2014.  Telephone visit for ongoing follow up.    His wife, Trevor, was not with him today.  Since his last visit, he was on [place] and thinks he may have had COVID-19 and fortunately has completely recovered.  [place]  Energy level and appetite are good.  Weight stable.  No fevers, chills, or night sweats.  No headache, vision changes, focal weakness or sensory changes.  No dyspnea, cough, or chest pain.  Normal bowel function.  No urinary complaints.  No bleeding, bruising, or skin rashes.  No pain.  No lumps or bumps.  Managing well.  He is off to [place] for work for the week.     REVIEW OF SYSTEMS:  A complete review of systems was negative other than  noted.    MEDICATIONS:  Current Outpatient Medications   Medication     amLODIPine (NORVASC) 5 MG tablet     levothyroxine (SYNTHROID/LEVOTHROID) 112 MCG tablet     multivitamin, therapeutic with minerals (MULTI-VITAMIN) TABS     No current facility-administered medications for this visit.      PHYSICAL EXAMINATION:  There were no vitals taken for this visit.  Wt Readings from Last 5 Encounters:   04/29/20 99.3 kg (218 lb 14.4 oz)   10/28/19 95 kg (209 lb 6.4 oz)   09/17/19 95.7 kg (211 lb)   04/29/19 92 kg (202 lb 14.4 oz)   10/26/18 89.7 kg (197 lb 12.8 oz)     LABORATORY DATA:  Recent Labs   Lab Test 04/29/20  0809 10/28/19  0814 04/29/19  0645  02/24/14  0826  11/25/13  1009 07/24/13  0755   WBC 4.8 5.2 5.6   < > 13.0*   < > 26.7* 15.9*   HGB 14.3 14.9 14.8   < > 12.7*   < > 14.2 13.9    209 248   < > 212   < > 245 227   ANEU 2.9 3.1 3.3   < > 4.3   < > 6.9 5.9   ALYM 1.1 1.3 1.4   < > 7.0*   < > 17.7* 7.5*   RETICABSCT  --   --   --   --  104.4*  --  80.0 59.2    < > = values in this interval not displayed.     Recent Labs   Lab Test 04/29/20  0809 10/28/19  0814 04/29/19  0645  05/18/15  0711  11/17/14  1204 10/27/14  1037  07/18/14  0906    137 137   < > 140   < > 141 139   < > 142   POTASSIUM 4.5 4.1 4.3   < > 4.9   < > 4.0 4.3   < > 4.5   CHLORIDE 110* 107 104   < > 106   < > 107 107   < > 102   CO2 29 27 28   < > 27   < > 24 26   < > 29   BUN 14 15 18   < > 13   < > 8 13   < > 16   CR 0.94 1.00 1.01   < > 0.89   < > 1.05 0.94   < > 0.89   RACHEAL 8.4* 8.8 8.8   < > 8.6   < > 9.1 9.5   < > 9.0   PHOS  --   --   --   --   --   --   --   --   --  2.9   URIC  --   --   --   --  6.1  --  6.3 5.8   < > 5.0    191 191   < > 194   < > 183 218   < > 464    < > = values in this interval not displayed.     Recent Labs   Lab Test 04/29/20  0809 10/28/19  0814 04/29/19  0645   AST 18 17 17   ALT 30 26 22   ALKPHOS 109 60 64   BILITOTAL 0.6 0.6 0.6     IMPRESSION AND PLAN:  Mr. Serrano is a 69 year old  "male with splenic marginal zone lymphoma.      In regards to his lymphoma, he clinically remains in remission.  Blood counts look good.  He has no lymphoma related complications.  We will continue to monitor.       He has no active infectious issues.  He already got a flu shot.  He is due for Shingrix and repeat Pneumovax but we will wait until after the COVID-19 situation has improved.       He will continue to work with Dr. Lawson for his general health maintenance including hypertension.        I plan to see him again in about 6 months with labs.  I reminded him to contact us in the interim if questions, concerns or new issues arise.     Call start time: 9:16 AM  Call end time: 9:27 AM  Call duration: 11 minutes    Carlos Beebe MD, PhD  Attending Physician, [facility]   of Medicine, [facility]  Division of Hematology, Oncology, and Transplantation  [email or web address] email  [phone] clinic  [phone] pager        Moose Serrano is a 69 year old male who is being evaluated via a billable telephone visit.      The patient has been notified of following:     \"This telephone visit will be conducted via a call between you and your physician/provider. We have found that certain health care needs can be provided without the need for a physical exam.  This service lets us provide the care you need with a short phone conversation.  If a prescription is necessary we can send it directly to your pharmacy.  If lab work is needed we can place an order for that and you can then stop by our lab to have the test done at a later time.    Telephone visits are billed at different rates depending on your insurance coverage. During this emergency period, for some insurers they may be billed the same as an in-person visit.  Please reach out to your insurance provider with any questions.    If during the course of the call the physician/provider feels a telephone visit is not appropriate, " "you will not be charged for this service.\"    *Pt has no new needs or concerns.  Leora Valadez CMA on 4/29/2020 at 9:12 AM  Rooming staff = Leora WEBB, [phone]    Patient has given verbal consent for Telephone visit?  Yes    How would you like to obtain your AVS? [place]      Again, thank you for allowing me to participate in the care of your patient.      Sincerely,    Carlos Beebe MD    "

## 2020-04-29 NOTE — PROGRESS NOTES
Chief Complaint   Patient presents with     Lab Only     Labs drawn by  to left arm. VS done. BP elevated at 163/106. Pt asymptomatic. Forgot BP meds this morning. Dr. Beebe notified.

## 2020-09-16 ENCOUNTER — TELEPHONE (OUTPATIENT)
Dept: FAMILY MEDICINE | Facility: CLINIC | Age: 70
End: 2020-09-16

## 2020-09-16 DIAGNOSIS — I10 HTN, GOAL BELOW 140/90: ICD-10-CM

## 2020-09-16 DIAGNOSIS — E03.4 HYPOTHYROIDISM DUE TO ACQUIRED ATROPHY OF THYROID: ICD-10-CM

## 2020-09-16 RX ORDER — LEVOTHYROXINE SODIUM 112 UG/1
TABLET ORAL
Qty: 90 TABLET | Refills: 0 | Status: SHIPPED | OUTPATIENT
Start: 2020-09-16 | End: 2020-10-12

## 2020-09-16 RX ORDER — AMLODIPINE BESYLATE 5 MG/1
TABLET ORAL
Qty: 30 TABLET | Refills: 0 | Status: SHIPPED | OUTPATIENT
Start: 2020-09-16 | End: 2020-10-12

## 2020-09-16 NOTE — TELEPHONE ENCOUNTER
Routing refill request to provider for review/approval because:  Labs out of range:  BP    MIRELLA BhatN, RN  [employer] Triage

## 2020-09-16 NOTE — LETTER
September 22, 2020      Moose Serrano  [address] [facility] [address] [facility] [address]        Dear Moose,    I care about your health and have reviewed your health plan. I have reviewed your medical conditions, medication list, and lab results and am making recommendations based on this review, to better manage your health.    You are in particular need of attention regarding:  -Wellness (Physical) Visit   -Flu Vaccine    I am recommending that you:  -Schedule a nurse appointment for a blood pressure check    Here is a list of Health Maintenance topics that are due now or due soon:  Health Maintenance Due   Topic Date Due     Zoster (Shingles) Vaccine (1 of 2) 11/17/2000     Annual Wellness Visit  05/18/2016     Discuss Advance Care Planning  12/12/2018     FALL RISK ASSESSMENT  08/14/2019     PHQ-2  01/01/2020     Flu Vaccine (1) 09/01/2020       Please call us at [phone] (or use [employer]) to address the above recommendations.     Thank you for trusting [facility] and we appreciate the opportunity to serve you.  We look forward to supporting your healthcare needs in the future.    Healthy Regards,    Dr. Lawson

## 2020-09-16 NOTE — TELEPHONE ENCOUNTER
A 90 day supply is given, patient is due for an office visit.  Please call to  assist the patient in scheduling an appointment.  APRIL Bhat, RN  [employer] Triage

## 2020-09-22 NOTE — TELEPHONE ENCOUNTER
vm left for patient, letter also sent.    .Yolanda WILLSON    [facility] Sabrina Livingston

## 2020-09-25 NOTE — TELEPHONE ENCOUNTER
S/w pt and advised an rx for amlodipine 5 mg # 30 was sent to the pharmacy on 9/16.  Should have enough meds to get to appt on 10/12 with Dr. Lawson.    Pt states understanding.    Ivett SALES RN  EP Triage

## 2020-09-25 NOTE — TELEPHONE ENCOUNTER
Corbin is calling stating that he will be out of town until 10/10 for work. Would like an extension on his amlodipine. Has PX for scheduled for 10/12. Please call Corbin when rx has been renewed. [phone].

## 2020-10-12 ENCOUNTER — OFFICE VISIT (OUTPATIENT)
Dept: FAMILY MEDICINE | Facility: CLINIC | Age: 70
End: 2020-10-12
Payer: COMMERCIAL

## 2020-10-12 VITALS
WEIGHT: 219 LBS | HEIGHT: 72 IN | DIASTOLIC BLOOD PRESSURE: 72 MMHG | BODY MASS INDEX: 29.66 KG/M2 | HEART RATE: 71 BPM | TEMPERATURE: 96 F | OXYGEN SATURATION: 98 % | SYSTOLIC BLOOD PRESSURE: 146 MMHG

## 2020-10-12 DIAGNOSIS — Z13.1 SCREENING FOR DIABETES MELLITUS: ICD-10-CM

## 2020-10-12 DIAGNOSIS — Z00.00 ANNUAL PHYSICAL EXAM: Primary | ICD-10-CM

## 2020-10-12 DIAGNOSIS — E03.4 HYPOTHYROIDISM DUE TO ACQUIRED ATROPHY OF THYROID: ICD-10-CM

## 2020-10-12 DIAGNOSIS — Z23 NEED FOR PROPHYLACTIC VACCINATION AND INOCULATION AGAINST INFLUENZA: ICD-10-CM

## 2020-10-12 DIAGNOSIS — Z12.5 SCREENING FOR PROSTATE CANCER: ICD-10-CM

## 2020-10-12 DIAGNOSIS — I10 HTN, GOAL BELOW 140/90: ICD-10-CM

## 2020-10-12 LAB
ANION GAP SERPL CALCULATED.3IONS-SCNC: 5 MMOL/L (ref 3–14)
BUN SERPL-MCNC: 14 MG/DL (ref 7–30)
CALCIUM SERPL-MCNC: 9.2 MG/DL (ref 8.5–10.1)
CHLORIDE SERPL-SCNC: 107 MMOL/L (ref 94–109)
CHOLEST SERPL-MCNC: 180 MG/DL
CO2 SERPL-SCNC: 26 MMOL/L (ref 20–32)
CREAT SERPL-MCNC: 1.06 MG/DL (ref 0.66–1.25)
GFR SERPL CREATININE-BSD FRML MDRD: 71 ML/MIN/{1.73_M2}
GLUCOSE SERPL-MCNC: 107 MG/DL (ref 70–99)
HBA1C MFR BLD: 5.1 % (ref 0–5.6)
HDLC SERPL-MCNC: 62 MG/DL
LDLC SERPL CALC-MCNC: 96 MG/DL
NONHDLC SERPL-MCNC: 118 MG/DL
POTASSIUM SERPL-SCNC: 4.6 MMOL/L (ref 3.4–5.3)
PSA SERPL-ACNC: 2.37 UG/L (ref 0–4)
SODIUM SERPL-SCNC: 138 MMOL/L (ref 133–144)
TRIGL SERPL-MCNC: 109 MG/DL
TSH SERPL DL<=0.005 MIU/L-ACNC: 2.46 MU/L (ref 0.4–4)

## 2020-10-12 PROCEDURE — 84443 ASSAY THYROID STIM HORMONE: CPT | Performed by: FAMILY MEDICINE

## 2020-10-12 PROCEDURE — 99214 OFFICE O/P EST MOD 30 MIN: CPT | Mod: 25 | Performed by: FAMILY MEDICINE

## 2020-10-12 PROCEDURE — 80061 LIPID PANEL: CPT | Performed by: FAMILY MEDICINE

## 2020-10-12 PROCEDURE — 90471 IMMUNIZATION ADMIN: CPT | Performed by: FAMILY MEDICINE

## 2020-10-12 PROCEDURE — 83036 HEMOGLOBIN GLYCOSYLATED A1C: CPT | Performed by: FAMILY MEDICINE

## 2020-10-12 PROCEDURE — G0103 PSA SCREENING: HCPCS | Performed by: FAMILY MEDICINE

## 2020-10-12 PROCEDURE — 90662 IIV NO PRSV INCREASED AG IM: CPT | Performed by: FAMILY MEDICINE

## 2020-10-12 PROCEDURE — 99397 PER PM REEVAL EST PAT 65+ YR: CPT | Mod: 25 | Performed by: FAMILY MEDICINE

## 2020-10-12 PROCEDURE — 80048 BASIC METABOLIC PNL TOTAL CA: CPT | Performed by: FAMILY MEDICINE

## 2020-10-12 RX ORDER — LEVOTHYROXINE SODIUM 112 UG/1
112 TABLET ORAL DAILY
Qty: 90 TABLET | Refills: 3 | Status: SHIPPED | OUTPATIENT
Start: 2020-10-12 | End: 2021-11-26

## 2020-10-12 RX ORDER — AMLODIPINE BESYLATE 5 MG/1
7.5 TABLET ORAL DAILY
Qty: 135 TABLET | Refills: 0 | Status: SHIPPED | OUTPATIENT
Start: 2020-10-12 | End: 2021-01-12

## 2020-10-12 ASSESSMENT — MIFFLIN-ST. JEOR: SCORE: 1796.38

## 2020-10-12 ASSESSMENT — ACTIVITIES OF DAILY LIVING (ADL): CURRENT_FUNCTION: NO ASSISTANCE NEEDED

## 2020-10-12 NOTE — LETTER
October 13, 2020      Moose Serrano  [address] [facility] [address]        Dear ,    I have reviewed your recent labs. Here are the results:    -PSA (prostate specific antigen) test is normal but higher than the pervious few years. I would like to recheck that in 4 months again.     -Cholesterol levels (LDL,HDL, Triglycerides) are normal.  ADVISE: rechecking in 1 year.   -Kidney function (GFR) is normal.  -Sodium is normal.  -Potassium is normal.  -Calcium is normal.    -Glucose is slight elevated and may be a sign of early diabetes (prediabetes). ADVISE:: eating a low carbohydrate diet, exercising, trying to lose weight (if necessary) and rechecking your glucose level in 4 months.    -A1C (diabetic test) is normal.    -TSH (thyroid stimulating hormone) level is normal which indicates normal thyroid function.        Results for orders placed or performed in visit on 10/12/20   Hemoglobin A1c     Status: None   Result Value Ref Range    Hemoglobin A1C 5.1 0 - 5.6 %   Prostate spec antigen screen     Status: None   Result Value Ref Range    PSA 2.37 0 - 4 ug/L   TSH     Status: None   Result Value Ref Range    TSH 2.46 0.40 - 4.00 mU/L   Lipid panel reflex to direct LDL Fasting     Status: None   Result Value Ref Range    Cholesterol 180 <200 mg/dL    Triglycerides 109 <150 mg/dL    HDL Cholesterol 62 >39 mg/dL    LDL Cholesterol Calculated 96 <100 mg/dL    Non HDL Cholesterol 118 <130 mg/dL   Basic metabolic panel     Status: Abnormal   Result Value Ref Range    Sodium 138 133 - 144 mmol/L    Potassium 4.6 3.4 - 5.3 mmol/L    Chloride 107 94 - 109 mmol/L    Carbon Dioxide 26 20 - 32 mmol/L    Anion Gap 5 3 - 14 mmol/L    Glucose 107 (H) 70 - 99 mg/dL    Urea Nitrogen 14 7 - 30 mg/dL    Creatinine 1.06 0.66 - 1.25 mg/dL    GFR Estimate 71 >60 mL/min/[1.73_m2]    GFR Estimate If Black 82 >60 mL/min/[1.73_m2]    Calcium 9.2 8.5 - 10.1 mg/dL         If you have any questions or concerns, please call the  clinic at the number listed above.       Sincerely,        Demetrio Lawson MD

## 2020-10-12 NOTE — PROGRESS NOTES
"SUBJECTIVE:   Moose Serrano is a 69 year old male who presents for Preventive Visit.      Patient has been advised of split billing requirements and indicates understanding: Yes   Are you in the first 12 months of your Medicare coverage?  No    Healthy Habits:    In general, how would you rate your overall health?  Very good    Frequency of exercise:  6-7 days/week    Duration of exercise:  15-30 minutes    Do you usually eat at least 4 servings of fruit and vegetables a day, include whole grains    & fiber and avoid regularly eating high fat or \"junk\" foods?  Yes    Taking medications regularly:  Yes    Barriers to taking medications:  None    Medication side effects:  None    Ability to successfully perform activities of daily living:  No assistance needed    Home Safety:  No safety concerns identified    Hearing Impairment:  No hearing concerns    In the past 6 months, have you been bothered by leaking of urine?  No    In general, how would you rate your overall mental or emotional health?  Very good      PHQ-2 Total Score:    Additional concerns today:  Yes       Would like to check for diabetes    Do you feel safe in your environment? YES    Have you ever done Advance Care Planning? (For example, a Health Directive, POLST, or a discussion with a medical provider or your loved ones about your wishes): No, advance care planning information given to patient to review.  Patient plans to discuss their wishes with loved ones or provider.        Fall risk  Fallen 2 or more times in the past year?: No  Any fall with injury in the past year?: No    Cognitive Screening   1) Repeat 3 items (Leader, Season, Table)    2) Clock draw: NORMAL  3) 3 item recall: Recalls 3 objects  Results: 3 items recalled: COGNITIVE IMPAIRMENT LESS LIKELY    Mini-CogTM Copyright WON Machado. Licensed by the author for use in [facility]; reprinted with permission ([email or web address]@.[facility]). All rights reserved.      Do you have sleep apnea, " excessive snoring or daytime drowsiness?: no    Reviewed and updated as needed this visit by clinical staff  Tobacco  Allergies  Meds              Reviewed and updated as needed this visit by Provider                Social History     Tobacco Use     Smoking status: Former Smoker     Packs/day: 0.50     Years: 34.00     Pack years: 17.00     Types: Cigarettes     Smokeless tobacco: Current User     Types: Chew     Tobacco comment: 1 can every 3-4 weeks   Substance Use Topics     Alcohol use: Yes     Comment: 2 beers per day     If you drink alcohol do you typically have >3 drinks per day or >7 drinks per week? Yes      Alcohol Use 10/12/2020   Prescreen: >3 drinks/day or >7 drinks/week? Yes           Hypertension Follow-up      Do you check your blood pressure regularly outside of the clinic? No     Are you following a low salt diet? No    Are your blood pressures ever more than 140 on the top number (systolic) OR more   than 90 on the bottom number (diastolic), for example 140/90? No    Hypothyroidism Follow-up      Since last visit, patient describes the following symptoms: Weight stable, no hair loss, no skin changes, no constipation, no loose stools      Current providers sharing in care for this patient include:   Patient Care Team:  Demetrio Lawson MD as PCP - General (Family Practice)  Venus Travis, RN as Nurse Coordinator (Hematology & Oncology)  Demetrio Lawson MD as Assigned PCP    The following health maintenance items are reviewed in Epic and correct as of today:  Health Maintenance   Topic Date Due     ZOSTER IMMUNIZATION (1 of 2) 11/17/2000     FALL RISK ASSESSMENT  08/14/2019     PHQ-2  01/01/2020     Pneumococcal Vaccine: Pediatrics (0 to 5 Years) and At-Risk Patients (6 to 64 Years) (3 of 3 - PPSV23) 11/16/2020     Pneumococcal Vaccine: 65+ Years (2 of 2 - PPSV23) 11/16/2020     MEDICARE ANNUAL WELLNESS VISIT  10/12/2021     COLORECTAL CANCER SCREENING  07/30/2023     LIPID  09/17/2024      DTAP/TDAP/TD IMMUNIZATION (2 - Td) 05/18/2025     ADVANCE CARE PLANNING  10/12/2025     HEPATITIS C SCREENING  Completed     INFLUENZA VACCINE  Completed     AORTIC ANEURYSM SCREENING (SYSTEM ASSIGNED)  Completed     IPV IMMUNIZATION  Aged Out     MENINGITIS IMMUNIZATION  Aged Out     HEPATITIS B IMMUNIZATION  Aged Out     Patient Active Problem List   Diagnosis     CARDIOVASCULAR SCREENING; LDL GOAL LESS THAN 160     Splenic marginal zone b-cell lymphoma (H)     Hypothyroidism due to acquired atrophy of thyroid     HTN, goal below 140/90     Past Surgical History:   Procedure Laterality Date     COLONOSCOPY  7/30/2013    Procedure: COLONOSCOPY;  Colonoscopy;  Surgeon: Dieudonne Harden MD;  Location:  GI     FINGER SURGERY  1996     right knee surgery  Approx 2002     VASECTOMY       WRIST SURGERY  1996       Social History     Tobacco Use     Smoking status: Former Smoker     Packs/day: 0.50     Years: 34.00     Pack years: 17.00     Types: Cigarettes     Smokeless tobacco: Current User     Types: Chew     Tobacco comment: 1 can every 3-4 weeks   Substance Use Topics     Alcohol use: Yes     Comment: 2 beers per day     Family History   Problem Relation Age of Onset     Unknown/Adopted No family hx of      Asthma No family hx of      C.A.D. No family hx of      Diabetes No family hx of      Hypertension No family hx of      Cerebrovascular Disease No family hx of      Breast Cancer No family hx of      Cancer - colorectal No family hx of      Prostate Cancer No family hx of      Alcohol/Drug No family hx of      Allergies No family hx of      Alzheimer Disease No family hx of      Anesthesia Reaction No family hx of      Arthritis No family hx of      Blood Disease No family hx of      Cancer No family hx of      Cardiovascular No family hx of      Circulatory No family hx of      Congenital Anomalies No family hx of      Connective Tissue Disorder No family hx of      Depression No family hx of      Endocrine  Disease No family hx of      Eye Disorder No family hx of      Genetic Disorder No family hx of      Gastrointestinal Disease No family hx of      Genitourinary Problems No family hx of      Gynecology No family hx of      Heart Disease No family hx of      Lipids No family hx of      Musculoskeletal Disorder No family hx of      Neurologic Disorder No family hx of      Obesity No family hx of      Osteoporosis No family hx of      Psychotic Disorder No family hx of      Respiratory No family hx of      Thyroid Disease No family hx of      Family History Negative No family hx of      Hearing Loss No family hx of          Current Outpatient Medications   Medication Sig Dispense Refill     amLODIPine (NORVASC) 5 MG tablet Take 1.5 tablets (7.5 mg) by mouth daily 135 tablet 0     levothyroxine (SYNTHROID/LEVOTHROID) 112 MCG tablet Take 1 tablet (112 mcg) by mouth daily 90 tablet 3     multivitamin, therapeutic with minerals (MULTI-VITAMIN) TABS Take 1 tablet by mouth daily       Allergies   Allergen Reactions     Penicillins Hives     Rituximab Nausea     Rigors         Review of Systems  CONSTITUTIONAL: NEGATIVE for fever, chills, change in weight  INTEGUMENTARY/SKIN: NEGATIVE for worrisome rashes, moles or lesions  EYES: NEGATIVE for vision changes or irritation  ENT/MOUTH: NEGATIVE for ear, mouth and throat problems  RESP: NEGATIVE for significant cough or SOB  BREAST: NEGATIVE for masses, tenderness or discharge  CV: NEGATIVE for chest pain, palpitations or peripheral edema  GI: NEGATIVE for nausea, abdominal pain, heartburn, or change in bowel habits  : NEGATIVE for frequency, dysuria, or hematuria  MUSCULOSKELETAL: NEGATIVE for significant arthralgias or myalgia  NEURO: NEGATIVE for weakness, dizziness or paresthesias  ENDOCRINE: NEGATIVE for temperature intolerance, skin/hair changes  HEME: NEGATIVE for bleeding problems  PSYCHIATRIC: NEGATIVE for changes in mood or affect    OBJECTIVE:   BP (!) 146/72 (BP  Location: Left arm, Patient Position: Chair, Cuff Size: Adult Large)   Pulse 71   Temp 96  F (35.6  C) (Tympanic)   Ht 1.829 m (6')   Wt 99.3 kg (219 lb)   SpO2 98%   BMI 29.70 kg/m   Estimated body mass index is 29.7 kg/m  as calculated from the following:    Height as of this encounter: 1.829 m (6').    Weight as of this encounter: 99.3 kg (219 lb).  Physical Exam  GENERAL: healthy, alert and no distress  EYES: Eyes grossly normal to inspection, PERRL and conjunctivae and sclerae normal  HENT: ear canals and TM's normal, nose and mouth without ulcers or lesions  NECK: no adenopathy, no asymmetry, masses, or scars and thyroid normal to palpation  RESP: lungs clear to auscultation - no rales, rhonchi or wheezes  CV: regular rate and rhythm, normal S1 S2, no S3 or S4, no murmur, click or rub, no peripheral edema and peripheral pulses strong  ABDOMEN: soft, nontender, no hepatosplenomegaly, no masses and bowel sounds normal  MS: no gross musculoskeletal defects noted, no edema  SKIN: no suspicious lesions or rashes  NEURO: Normal strength and tone, mentation intact and speech normal  PSYCH: mentation appears normal, affect normal/bright        ASSESSMENT / PLAN:   1. Annual physical exam      2. HTN, goal below 140/90  Not well controlled.  Increasing the dose of amlodipine from 5 to 7.5 mg daily.  Patient has an upcoming appointment with oncology.  Blood pressure is not improved here, is asked to call me back to further adjust the dose of the medication.  - amLODIPine (NORVASC) 5 MG tablet; Take 1.5 tablets (7.5 mg) by mouth daily  Dispense: 135 tablet; Refill: 0  - Lipid panel reflex to direct LDL Fasting  - Basic metabolic panel    3. Hypothyroidism due to acquired atrophy of thyroid  Thyroid function test ordered.  Questionable control at this time.  Previously it was within normal range.  - levothyroxine (SYNTHROID/LEVOTHROID) 112 MCG tablet; Take 1 tablet (112 mcg) by mouth daily  Dispense: 90 tablet;  Refill: 3  - TSH    4. Screening for prostate cancer    - Prostate spec antigen screen    5. Screening for diabetes mellitus    - Hemoglobin A1c    6. Need for prophylactic vaccination and inoculation against influenza    - FLUZONE HIGH DOSE 65+  [97879]  - Vaccine Administration, Initial [80755]    Patient has been advised of split billing requirements and indicates understanding:   COUNSELING:  Reviewed preventive health counseling, as reflected in patient instructions    Estimated body mass index is 29.7 kg/m  as calculated from the following:    Height as of this encounter: 1.829 m (6').    Weight as of this encounter: 99.3 kg (219 lb).        He reports that he has quit smoking. His smoking use included cigarettes. He has a 17.00 pack-year smoking history. His smokeless tobacco use includes chew.      Appropriate preventive services were discussed with this patient, including applicable screening as appropriate for cardiovascular disease, diabetes, osteopenia/osteoporosis, and glaucoma.  As appropriate for age/gender, discussed screening for colorectal cancer, prostate cancer, breast cancer, and cervical cancer. Checklist reviewing preventive services available has been given to the patient.    Reviewed patients plan of care and provided an AVS. The Intermediate Care Plan ( asthma action plan, low back pain action plan, and migraine action plan) for Moose meets the Care Plan requirement. This Care Plan has been established and reviewed with the Patient.    Counseling Resources:  ATP IV Guidelines  Pooled Cohorts Equation Calculator  Breast Cancer Risk Calculator  Breast Cancer: Medication to Reduce Risk  FRAX Risk Assessment  ICSI Preventive Guidelines  Dietary Guidelines for Americans, 2010  [employer]'s MyPlate  ASA Prophylaxis  Lung CA Screening    Demetrio Lawson MD  [facility]    Identified Health Risks:

## 2020-11-17 VITALS
WEIGHT: 218.6 LBS | OXYGEN SATURATION: 97 % | HEART RATE: 86 BPM | TEMPERATURE: 97.7 F | SYSTOLIC BLOOD PRESSURE: 168 MMHG | BODY MASS INDEX: 29.65 KG/M2 | RESPIRATION RATE: 18 BRPM | DIASTOLIC BLOOD PRESSURE: 105 MMHG

## 2020-11-17 DIAGNOSIS — C83.07 SPLENIC MARGINAL ZONE B-CELL LYMPHOMA (H): ICD-10-CM

## 2020-11-17 LAB
ALBUMIN SERPL-MCNC: 4.1 G/DL (ref 3.4–5)
ALP SERPL-CCNC: 71 U/L (ref 40–150)
ALT SERPL W P-5'-P-CCNC: 25 U/L (ref 0–70)
ANION GAP SERPL CALCULATED.3IONS-SCNC: 4 MMOL/L (ref 3–14)
AST SERPL W P-5'-P-CCNC: 11 U/L (ref 0–45)
BASOPHILS # BLD AUTO: 0.1 10E9/L (ref 0–0.2)
BASOPHILS NFR BLD AUTO: 1.2 %
BILIRUB SERPL-MCNC: 0.8 MG/DL (ref 0.2–1.3)
BUN SERPL-MCNC: 12 MG/DL (ref 7–30)
CALCIUM SERPL-MCNC: 8.8 MG/DL (ref 8.5–10.1)
CHLORIDE SERPL-SCNC: 105 MMOL/L (ref 94–109)
CO2 SERPL-SCNC: 26 MMOL/L (ref 20–32)
CREAT SERPL-MCNC: 1.03 MG/DL (ref 0.66–1.25)
DIFFERENTIAL METHOD BLD: ABNORMAL
EOSINOPHIL # BLD AUTO: 0.1 10E9/L (ref 0–0.7)
EOSINOPHIL NFR BLD AUTO: 1.5 %
ERYTHROCYTE [DISTWIDTH] IN BLOOD BY AUTOMATED COUNT: 12.8 % (ref 10–15)
GFR SERPL CREATININE-BSD FRML MDRD: 73 ML/MIN/{1.73_M2}
GLUCOSE SERPL-MCNC: 126 MG/DL (ref 70–99)
HCT VFR BLD AUTO: 46.3 % (ref 40–53)
HGB BLD-MCNC: 15.6 G/DL (ref 13.3–17.7)
IMM GRANULOCYTES # BLD: 0 10E9/L (ref 0–0.4)
IMM GRANULOCYTES NFR BLD: 0.2 %
LDH SERPL L TO P-CCNC: 170 U/L (ref 85–227)
LYMPHOCYTES # BLD AUTO: 1.2 10E9/L (ref 0.8–5.3)
LYMPHOCYTES NFR BLD AUTO: 21.1 %
MCH RBC QN AUTO: 33.5 PG (ref 26.5–33)
MCHC RBC AUTO-ENTMCNC: 33.7 G/DL (ref 31.5–36.5)
MCV RBC AUTO: 99 FL (ref 78–100)
MONOCYTES # BLD AUTO: 0.8 10E9/L (ref 0–1.3)
MONOCYTES NFR BLD AUTO: 14 %
NEUTROPHILS # BLD AUTO: 3.6 10E9/L (ref 1.6–8.3)
NEUTROPHILS NFR BLD AUTO: 62 %
NRBC # BLD AUTO: 0 10*3/UL
NRBC BLD AUTO-RTO: 0 /100
PLATELET # BLD AUTO: 263 10E9/L (ref 150–450)
POTASSIUM SERPL-SCNC: 3.9 MMOL/L (ref 3.4–5.3)
PROT SERPL-MCNC: 7.7 G/DL (ref 6.8–8.8)
RBC # BLD AUTO: 4.66 10E12/L (ref 4.4–5.9)
SODIUM SERPL-SCNC: 135 MMOL/L (ref 133–144)
WBC # BLD AUTO: 5.8 10E9/L (ref 4–11)

## 2020-11-17 PROCEDURE — 80053 COMPREHEN METABOLIC PANEL: CPT | Performed by: INTERNAL MEDICINE

## 2020-11-17 PROCEDURE — 83615 LACTATE (LD) (LDH) ENZYME: CPT | Performed by: INTERNAL MEDICINE

## 2020-11-17 PROCEDURE — 85025 COMPLETE CBC W/AUTO DIFF WBC: CPT | Performed by: INTERNAL MEDICINE

## 2020-11-17 ASSESSMENT — PAIN SCALES - GENERAL: PAINLEVEL: NO PAIN (0)

## 2020-11-17 NOTE — NURSING NOTE
Chief Complaint   Patient presents with     Blood Draw     Labs drawn via VPT by RN in lab. VS taken.      Labs collected from venipuncture by RN. Vitals taken.     Text paged Dr. Beebe with BP readings. Pt states that his BP runs high around 150's / 90's Pt is asymptomatic, slight light headedness when bending over otherwise feels fine. Advised pt to check BP at home. Pt states he did just run in from the parking lot so this could be why it is high.    Nenita BERGER RN PHN BSN  BMT/Oncology Lab

## 2020-11-18 ENCOUNTER — VIRTUAL VISIT (OUTPATIENT)
Dept: ONCOLOGY | Facility: CLINIC | Age: 70
End: 2020-11-18
Attending: INTERNAL MEDICINE
Payer: COMMERCIAL

## 2020-11-18 DIAGNOSIS — C83.07 SPLENIC MARGINAL ZONE B-CELL LYMPHOMA (H): Primary | ICD-10-CM

## 2020-11-18 DIAGNOSIS — I10 HTN, GOAL BELOW 140/90: ICD-10-CM

## 2020-11-18 PROCEDURE — 99213 OFFICE O/P EST LOW 20 MIN: CPT | Mod: 95 | Performed by: INTERNAL MEDICINE

## 2020-11-18 PROCEDURE — 999N001193 HC VIDEO/TELEPHONE VISIT; NO CHARGE

## 2020-11-18 NOTE — LETTER
"    11/18/2020         RE: Moose Serrano  [address] [facility] [address]        Dear Colleague,    Thank you for referring your patient, Moose Serrano, to the [facility]. Please see a copy of my visit note below.    Moose Serrano is a 70 year old male who is being evaluated via a billable video visit.      The patient has been notified of following:     \"This video visit will be conducted via a call between you and your physician/provider. We have found that certain health care needs can be provided without the need for an in-person physical exam.  This service lets us provide the care you need with a video conversation.  If a prescription is necessary we can send it directly to your pharmacy.  If lab work is needed we can place an order for that and you can then stop by our lab to have the test done at a later time.    Video visits are billed at different rates depending on your insurance coverage.  Please reach out to your insurance provider with any questions.    If during the course of the call the physician/provider feels a video visit is not appropriate, you will not be charged for this service.\"    Patient has given verbal consent for Video visit? Yes  How would you like to obtain your AVS? Mail a copy  If you are dropped from the video visit, the video invite should be resent to: Other e-mail: [email or web address]  Will anyone else be joining your video visit? No      Eliza BARBOUR      Video-Visit Details    Type of service:  Video Visit    Video Start Time: 8:36 AM  Video End Time: 8:52 AM    Originating Location (pt. Location): Home    Distant Location (provider location):  [facility]     Platform used for Video Visit: Tala Randall MD        REASON FOR VISIT:  Follow up splenic marginal zone lymphoma.       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  To summarize his " course, he was noted to have progressive leukocytosis on routine blood work in mid 2013.  PET/CT revealed splenomegaly and lymphadenopathy in the axilla, pretracheal area, and portacaval area.  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  He was initially monitored without treatment.  After developing abdominal pain with massive splenomegaly, weight loss, and night sweats he was started on rituximab in 7/2014.  He had difficulty tolerating rituximab due to severe infusion reaction, so he was treated with CVP alone and then rituximab was successfully added with later cycles.  Otherwise, he tolerated treatment very well and completed 6 cycles at the end of 10/2014.  Video visit for ongoing follow up.    His wife, Trevor, is listening in on the visit.  He has been following with his primary care doctor and working on getting his blood pressure under better control with amlodipine.  His blood pressure was high yesterday after running in from the parking lot.  He does get a little dizzy when he bends over sometimes.  He is continue to work including travel.  He is following guidelines in terms of social distancing.  Energy level and appetite are good.  Weight stable.  No fevers, chills, or night sweats.  No headache, vision changes, focal weakness or sensory changes.  No dyspnea, cough, or chest pain.  Normal bowel function.  No urinary complaints.  No bleeding, bruising, or skin rashes.  No pain including abdominal pain.  No lumps or bumps.  Managing well. He continues to have a fatty tumor on his head which is stable.  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     REVIEW OF SYSTEMS:  A complete review of systems was negative other than noted.    MEDICATIONS:  Current Outpatient Medications   Medication     amLODIPine (NORVASC) 5 MG  tablet     levothyroxine (SYNTHROID/LEVOTHROID) 112 MCG tablet     multivitamin, therapeutic with minerals (MULTI-VITAMIN) TABS     No current facility-administered medications for this visit.      PHYSICAL EXAMINATION:  There were no vitals taken for this visit.  Wt Readings from Last 5 Encounters:   11/17/20 99.2 kg (218 lb 9.6 oz)   10/12/20 99.3 kg (219 lb)   04/29/20 99.3 kg (218 lb 14.4 oz)   10/28/19 95 kg (209 lb 6.4 oz)   09/17/19 95.7 kg (211 lb)     General: Well appearing.  HEENT: Sclerae anicteric.  Lungs: Breathing comfortably. No cough.  MSK: Grossly normal movement.  Neuro: Grossly non-focal.  Skin/access: Normal skin tone.  Psych: Alert and oriented. No distress.  Performance status: ECOG 0    The rest of a comprehensive physical examination is deferred due to PHE (public health emergency) video visit restrictions      LABORATORY DATA:  Recent Labs   Lab Test 11/17/20  0710 04/29/20  0809 10/28/19  0814 04/29/19  0645   WBC 5.8 4.8 5.2 5.6   HGB 15.6 14.3 14.9 14.8    220 209 248   ANEU 3.6 2.9 3.1 3.3   ALYM 1.2 1.1 1.3 1.4     Recent Labs   Lab Test 11/17/20  0710 05/18/15  0711 05/18/15  0711 11/17/14  1204 11/17/14  1204 10/27/14  1037 09/15/14  0657 09/15/14  0657 07/18/14  0906 07/18/14  0906      < > 140   < > 141 139   < > 138   < > 142   POTASSIUM 3.9   < > 4.9   < > 4.0 4.3   < > 4.5   < > 4.5   CHLORIDE 105   < > 106   < > 107 107   < > 105   < > 102   CO2 26   < > 27   < > 24 26   < > 29   < > 29   BUN 12   < > 13   < > 8 13   < > 13   < > 16   CR 1.03   < > 0.89   < > 1.05 0.94   < > 0.94   < > 0.89   RACHEAL 8.8   < > 8.6   < > 9.1 9.5   < > 8.9   < > 9.0   PHOS  --   --   --   --   --   --   --   --   --  2.9      < > 194   < > 183 218  --  189   < > 464   URIC  --   --  6.1  --  6.3 5.8  --  4.6   < > 5.0    < > = values in this interval not displayed.     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      In regards to his lymphoma, he clinically remains in remission.  Blood counts look good.  He has no lymphoma related complications.  We will continue to monitor.       He has no active infectious issues.  He already got a flu shot this year through Dr. Lawson.  He is due for Shingrix 2 shot series and repeat Pneumovax booster since it has been 5 years.     He will continue to work with Dr. Lawson for his general health maintenance including hypertension.        I plan to see him again in about 6-12 months with labs. I reminded him to contact us in the interim if questions, concerns or new issues arise.     Marítnez Randall MD  Hematology/Oncology Fellow      ATTENDING ADDENDUM:  I was present for the entire virtual visit.  I have reviewed the vital signs, medications, labs, and imaging results independently, and have discussed the patient and plan with the fellow, and agree with the findings and plan outlined in this note.  The impression and plan were jointly made.    Carlos Beebe MD, PhD  Attending Physician, [facility]   of Medicine, [facility]  Division of Hematology, Oncology, and Transplantation  [phone] [facility]

## 2020-11-18 NOTE — PROGRESS NOTES
"Moose Serrano is a 70 year old male who is being evaluated via a billable video visit.      The patient has been notified of following:     \"This video visit will be conducted via a call between you and your physician/provider. We have found that certain health care needs can be provided without the need for an in-person physical exam.  This service lets us provide the care you need with a video conversation.  If a prescription is necessary we can send it directly to your pharmacy.  If lab work is needed we can place an order for that and you can then stop by our lab to have the test done at a later time.    Video visits are billed at different rates depending on your insurance coverage.  Please reach out to your insurance provider with any questions.    If during the course of the call the physician/provider feels a video visit is not appropriate, you will not be charged for this service.\"    Patient has given verbal consent for Video visit? Yes  How would you like to obtain your AVS? Mail a copy  If you are dropped from the video visit, the video invite should be resent to: Other e-mail: [email or web address]  Will anyone else be joining your video visit? No      Eliza BARBOUR      Video-Visit Details    Type of service:  Video Visit    Video Start Time: 8:36 AM  Video End Time: 8:52 AM    Originating Location (pt. Location): Home    Distant Location (provider location):  [facility]     Platform used for Video Visit: Tala Randall MD        REASON FOR VISIT:  Follow up splenic marginal zone lymphoma.       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  To summarize his course, he was noted to have progressive leukocytosis on routine blood work in mid 2013.  PET/CT revealed splenomegaly and lymphadenopathy in the axilla, pretracheal area, and portacaval area.  Bone marrow biopsy on 12/12/2013 was diagnostic for splenic " marginal zone lymphoma (SMZL) with +3, +8 and +18 in a dominant clone, as well as a second clone that also contained loss of 21.  He was initially monitored without treatment.  After developing abdominal pain with massive splenomegaly, weight loss, and night sweats he was started on rituximab in 7/2014.  He had difficulty tolerating rituximab due to severe infusion reaction, so he was treated with CVP alone and then rituximab was successfully added with later cycles.  Otherwise, he tolerated treatment very well and completed 6 cycles at the end of 10/2014.  Video visit for ongoing follow up.    His wife, Trevor, is listening in on the visit.  He has been following with his primary care doctor and working on getting his blood pressure under better control with amlodipine.  His blood pressure was high yesterday after running in from the parking lot.  He does get a little dizzy when he bends over sometimes.  He is continue to work including travel.  He is following guidelines in terms of social distancing.  Energy level and appetite are good.  Weight stable.  No fevers, chills, or night sweats.  No headache, vision changes, focal weakness or sensory changes.  No dyspnea, cough, or chest pain.  Normal bowel function.  No urinary complaints.  No bleeding, bruising, or skin rashes.  No pain including abdominal pain.  No lumps or bumps.  Managing well. He continues to have a fatty tumor on his head which is stable.  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     REVIEW OF SYSTEMS:  A complete review of systems was negative other than noted.    MEDICATIONS:  Current Outpatient Medications   Medication     amLODIPine (NORVASC) 5 MG tablet     levothyroxine (SYNTHROID/LEVOTHROID) 112 MCG tablet     multivitamin, therapeutic with minerals (MULTI-VITAMIN) TABS     No current facility-administered medications for this visit.      PHYSICAL EXAMINATION:  There were no vitals taken for  this visit.  Wt Readings from Last 5 Encounters:   11/17/20 99.2 kg (218 lb 9.6 oz)   10/12/20 99.3 kg (219 lb)   04/29/20 99.3 kg (218 lb 14.4 oz)   10/28/19 95 kg (209 lb 6.4 oz)   09/17/19 95.7 kg (211 lb)     General: Well appearing.  HEENT: Sclerae anicteric.  Lungs: Breathing comfortably. No cough.  MSK: Grossly normal movement.  Neuro: Grossly non-focal.  Skin/access: Normal skin tone.  Psych: Alert and oriented. No distress.  Performance status: ECOG 0    The rest of a comprehensive physical examination is deferred due to PHE (public health emergency) video visit restrictions      LABORATORY DATA:  Recent Labs   Lab Test 11/17/20  0710 04/29/20  0809 10/28/19  0814 04/29/19  0645   WBC 5.8 4.8 5.2 5.6   HGB 15.6 14.3 14.9 14.8    220 209 248   ANEU 3.6 2.9 3.1 3.3   ALYM 1.2 1.1 1.3 1.4     Recent Labs   Lab Test 11/17/20  0710 05/18/15  0711 05/18/15  0711 11/17/14  1204 11/17/14  1204 10/27/14  1037 09/15/14  0657 09/15/14  0657 07/18/14  0906 07/18/14  0906      < > 140   < > 141 139   < > 138   < > 142   POTASSIUM 3.9   < > 4.9   < > 4.0 4.3   < > 4.5   < > 4.5   CHLORIDE 105   < > 106   < > 107 107   < > 105   < > 102   CO2 26   < > 27   < > 24 26   < > 29   < > 29   BUN 12   < > 13   < > 8 13   < > 13   < > 16   CR 1.03   < > 0.89   < > 1.05 0.94   < > 0.94   < > 0.89   RACHEAL 8.8   < > 8.6   < > 9.1 9.5   < > 8.9   < > 9.0   PHOS  --   --   --   --   --   --   --   --   --  2.9      < > 194   < > 183 218  --  189   < > 464   URIC  --   --  6.1  --  6.3 5.8  --  4.6   < > 5.0    < > = values in this interval not displayed.     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      In regards to his lymphoma, he clinically remains in remission.  Blood counts look good.  He  has no lymphoma related complications.  We will continue to monitor.       He has no active infectious issues.  He already got a flu shot this year through Dr. Lawson.  He is due for Shingrix 2 shot series and repeat Pneumovax booster since it has been 5 years.     He will continue to work with Dr. Lawson for his general health maintenance including hypertension.        I plan to see him again in about 6-12 months with labs. I reminded him to contact us in the interim if questions, concerns or new issues arise.     Martínez Randall MD  Hematology/Oncology Fellow      ATTENDING ADDENDUM:  I was present for the entire virtual visit.  I have reviewed the vital signs, medications, labs, and imaging results independently, and have discussed the patient and plan with the fellow, and agree with the findings and plan outlined in this note.  The impression and plan were jointly made.    Carlos Beebe MD, PhD  Attending Physician, [facility]   of Medicine, [facility]  Division of Hematology, Oncology, and Transplantation  [phone] [facility]

## 2020-11-18 NOTE — LETTER
"    11/18/2020         RE: Moose Serrano  [address] [facility] [address] [facility] [address]        Dear Colleague,    Thank you for referring your patient, Moose Serrano, to the [facility]. Please see a copy of my visit note below.    Moose Serrano is a 70 year old male who is being evaluated via a billable video visit.      The patient has been notified of following:     \"This video visit will be conducted via a call between you and your physician/provider. We have found that certain health care needs can be provided without the need for an in-person physical exam.  This service lets us provide the care you need with a video conversation.  If a prescription is necessary we can send it directly to your pharmacy.  If lab work is needed we can place an order for that and you can then stop by our lab to have the test done at a later time.    Video visits are billed at different rates depending on your insurance coverage.  Please reach out to your insurance provider with any questions.    If during the course of the call the physician/provider feels a video visit is not appropriate, you will not be charged for this service.\"    Patient has given verbal consent for Video visit? Yes  How would you like to obtain your AVS? Mail a copy  If you are dropped from the video visit, the video invite should be resent to: Other e-mail: [email or web address]  Will anyone else be joining your video visit? No      Eliza BARBOUR      Video-Visit Details    Type of service:  Video Visit    Video Start Time: 8:36 AM  Video End Time: 8:52 AM    Originating Location (pt. Location): Home    Distant Location (provider location):  [facility]     Platform used for Video Visit: Tala Randall MD        REASON FOR VISIT:  Follow up splenic marginal zone lymphoma.       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  To summarize his " course, he was noted to have progressive leukocytosis on routine blood work in mid 2013.  PET/CT revealed splenomegaly and lymphadenopathy in the axilla, pretracheal area, and portacaval area.  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  He was initially monitored without treatment.  After developing abdominal pain with massive splenomegaly, weight loss, and night sweats he was started on rituximab in 7/2014.  He had difficulty tolerating rituximab due to severe infusion reaction, so he was treated with CVP alone and then rituximab was successfully added with later cycles.  Otherwise, he tolerated treatment very well and completed 6 cycles at the end of 10/2014.  Video visit for ongoing follow up.    His wife, Trevor, is listening in on the visit.  He has been following with his primary care doctor and working on getting his blood pressure under better control with amlodipine.  His blood pressure was high yesterday after running in from the parking lot.  He does get a little dizzy when he bends over sometimes.  He is continue to work including travel.  He is following guidelines in terms of social distancing.  Energy level and appetite are good.  Weight stable.  No fevers, chills, or night sweats.  No headache, vision changes, focal weakness or sensory changes.  No dyspnea, cough, or chest pain.  Normal bowel function.  No urinary complaints.  No bleeding, bruising, or skin rashes.  No pain including abdominal pain.  No lumps or bumps.  Managing well. He continues to have a fatty tumor on his head which is stable.  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     REVIEW OF SYSTEMS:  A complete review of systems was negative other than noted.    MEDICATIONS:  Current Outpatient Medications   Medication     amLODIPine (NORVASC) 5 MG  tablet     levothyroxine (SYNTHROID/LEVOTHROID) 112 MCG tablet     multivitamin, therapeutic with minerals (MULTI-VITAMIN) TABS     No current facility-administered medications for this visit.      PHYSICAL EXAMINATION:  There were no vitals taken for this visit.  Wt Readings from Last 5 Encounters:   11/17/20 99.2 kg (218 lb 9.6 oz)   10/12/20 99.3 kg (219 lb)   04/29/20 99.3 kg (218 lb 14.4 oz)   10/28/19 95 kg (209 lb 6.4 oz)   09/17/19 95.7 kg (211 lb)     General: Well appearing.  HEENT: Sclerae anicteric.  Lungs: Breathing comfortably. No cough.  MSK: Grossly normal movement.  Neuro: Grossly non-focal.  Skin/access: Normal skin tone.  Psych: Alert and oriented. No distress.  Performance status: ECOG 0    The rest of a comprehensive physical examination is deferred due to PHE (public health emergency) video visit restrictions      LABORATORY DATA:  Recent Labs   Lab Test 11/17/20  0710 04/29/20  0809 10/28/19  0814 04/29/19  0645   WBC 5.8 4.8 5.2 5.6   HGB 15.6 14.3 14.9 14.8    220 209 248   ANEU 3.6 2.9 3.1 3.3   ALYM 1.2 1.1 1.3 1.4     Recent Labs   Lab Test 11/17/20  0710 05/18/15  0711 05/18/15  0711 11/17/14  1204 11/17/14  1204 10/27/14  1037 09/15/14  0657 09/15/14  0657 07/18/14  0906 07/18/14  0906      < > 140   < > 141 139   < > 138   < > 142   POTASSIUM 3.9   < > 4.9   < > 4.0 4.3   < > 4.5   < > 4.5   CHLORIDE 105   < > 106   < > 107 107   < > 105   < > 102   CO2 26   < > 27   < > 24 26   < > 29   < > 29   BUN 12   < > 13   < > 8 13   < > 13   < > 16   CR 1.03   < > 0.89   < > 1.05 0.94   < > 0.94   < > 0.89   RACHEAL 8.8   < > 8.6   < > 9.1 9.5   < > 8.9   < > 9.0   PHOS  --   --   --   --   --   --   --   --   --  2.9      < > 194   < > 183 218  --  189   < > 464   URIC  --   --  6.1  --  6.3 5.8  --  4.6   < > 5.0    < > = values in this interval not displayed.     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      In regards to his lymphoma, he clinically remains in remission.  Blood counts look good.  He has no lymphoma related complications.  We will continue to monitor.       He has no active infectious issues.  He already got a flu shot this year through Dr. Lawson.  He is due for Shingrix 2 shot series and repeat Pneumovax booster since it has been 5 years.     He will continue to work with Dr. Lawson for his general health maintenance including hypertension.        I plan to see him again in about 6-12 months with labs. I reminded him to contact us in the interim if questions, concerns or new issues arise.     Martínez Randall MD  Hematology/Oncology Fellow      ATTENDING ADDENDUM:  I was present for the entire virtual visit.  I have reviewed the vital signs, medications, labs, and imaging results independently, and have discussed the patient and plan with the fellow, and agree with the findings and plan outlined in this note.  The impression and plan were jointly made.    Carlos Beebe MD, PhD  Attending Physician, [facility] [place]   of Medicine, [facility]  Division of Hematology, Oncology, and Transplantation  [phone] clinic        Again, thank you for allowing me to participate in the care of your patient.        Sincerely,        Carlos Beeeb MD

## 2020-11-19 ENCOUNTER — ALLIED HEALTH/NURSE VISIT (OUTPATIENT)
Dept: FAMILY MEDICINE | Facility: CLINIC | Age: 70
End: 2020-11-19
Payer: COMMERCIAL

## 2020-11-19 DIAGNOSIS — Z23 NEED FOR VACCINATION: Primary | ICD-10-CM

## 2020-11-19 PROCEDURE — 99207 PR NO CHARGE NURSE ONLY: CPT

## 2020-11-19 PROCEDURE — 90732 PPSV23 VACC 2 YRS+ SUBQ/IM: CPT

## 2020-11-19 PROCEDURE — 90471 IMMUNIZATION ADMIN: CPT

## 2020-11-19 NOTE — NURSING NOTE
Prior to immunization administration, verified patients identity using patient s name and date of birth. Please see Immunization Activity for additional information.     Screening Questionnaire for Adult Immunization    Are you sick today?   No   Do you have allergies to medications, food, a vaccine component or latex?   No   Have you ever had a serious reaction after receiving a vaccination?   No   Do you have a long-term health problem with heart, lung, kidney, or metabolic disease (e.g., diabetes), asthma, a blood disorder, no spleen, complement component deficiency, a cochlear implant, or a spinal fluid leak?  Are you on long-term aspirin therapy?   No   Do you have cancer, leukemia, HIV/AIDS, or any other immune system problem?   No   Do you have a parent, brother, or sister with an immune system problem?   No   In the past 3 months, have you taken medications that affect  your immune system, such as prednisone, other steroids, or anticancer drugs; drugs for the treatment of rheumatoid arthritis, Crohn s disease, or psoriasis; or have you had radiation treatments?   No   Have you had a seizure, or a brain or other nervous system problem?   No   During the past year, have you received a transfusion of blood or blood    products, or been given immune (gamma) globulin or antiviral drug?   No   For women: Are you pregnant or is there a chance you could become       pregnant during the next month?   No   Have you received any vaccinations in the past 4 weeks?   No     Immunization questionnaire answers were all negative.        Per orders of Dr. Alanis, injection of [id number] given by Kuldip Rivera MA. Patient instructed to remain in clinic for 15 minutes afterwards, and to report any adverse reaction to me immediately- patient declined.       Screening performed by Kuldip Rivera MA on 11/19/2020 at 9:11 AM.

## 2021-01-12 DIAGNOSIS — I10 HTN, GOAL BELOW 140/90: ICD-10-CM

## 2021-01-12 RX ORDER — AMLODIPINE BESYLATE 5 MG/1
TABLET ORAL
Qty: 135 TABLET | Refills: 0 | Status: SHIPPED | OUTPATIENT
Start: 2021-01-12 | End: 2021-04-14

## 2021-01-12 NOTE — TELEPHONE ENCOUNTER
"Failed bp    Requested Prescriptions   Pending Prescriptions Disp Refills     amLODIPine (NORVASC) 5 MG tablet [Pharmacy Med Name: amlodipine 5 mg tablet] 135 tablet 0     Sig: Take 1 and 1/2 tablets (7.5 mg) by mouth once daily       Calcium Channel Blockers Protocol  Failed - 1/12/2021  8:00 AM        Failed - Blood pressure under 140/90 in past 12 months     BP Readings from Last 3 Encounters:   11/17/20 (!) 168/105   10/12/20 (!) 146/72   04/29/20 (!) 182/109                 Passed - Recent (12 mo) or future (30 days) visit within the authorizing provider's specialty     Patient has had an office visit with the authorizing provider or a provider within the authorizing providers department within the previous 12 mos or has a future within next 30 days. See \"Patient Info\" tab in inbasket, or \"Choose Columns\" in Meds & Orders section of the refill encounter.              Passed - Medication is active on med list        Passed - Patient is age 18 or older        Passed - Normal serum creatinine on file in past 12 months     Recent Labs   Lab Test 11/17/20  0710   CR 1.03       Ok to refill medication if creatinine is low               "

## 2021-01-12 NOTE — LETTER
January 13, 2021      Moosemisty Boschpe  [address] [address]          Dear  Zach,    Dr Lawson has ordered a refill of your Amlodipine so you do not run out. It has been consistently high in the past few visits.  She would like to see you in office as soon as possible.  Please call the clinic at [phone] to schedule an appointment.        Sincerely,    Demetrio Lawson MD  [facility]

## 2021-01-12 NOTE — TELEPHONE ENCOUNTER
I have ordered a refill so he does not run out of the medication but his blood pressure has been consistently high in the past few visits.  I would like to see him in office as soon as possible.    Demetrio Lawson MD  [facility], [place]

## 2021-04-13 DIAGNOSIS — I10 HTN, GOAL BELOW 140/90: ICD-10-CM

## 2021-04-13 NOTE — LETTER
April 15, 2021      Moose DENA Boschpe  [address] [address]          Dear Mr. Serrano,    Your physician requires an office visit in order to monitor your maintenance medication(s).  Your last office visit was on 10/12/2021.  We have faxed to the pharmacy a 1 month refill of your medication(s) until you can be seen by your provider.  Please call the clinic as soon as possible at [phone] to schedule an appointment.        Sincerely,    [facility]

## 2021-04-14 RX ORDER — AMLODIPINE BESYLATE 5 MG/1
TABLET ORAL
Qty: 45 TABLET | Refills: 0 | Status: SHIPPED | OUTPATIENT
Start: 2021-04-14 | End: 2021-04-26

## 2021-04-14 NOTE — TELEPHONE ENCOUNTER
1 month supply of the medication ordered.  Patient needs to come in for blood pressure check.  Please have him schedule an office visit with me as soon as possible.    Demetrio Lawson MD  [facility], [place]

## 2021-04-14 NOTE — TELEPHONE ENCOUNTER
Routing refill request to provider for review/approval because:  Failed BP protocol    Ivett SALES RN  EP Triage

## 2021-04-26 ENCOUNTER — OFFICE VISIT (OUTPATIENT)
Dept: FAMILY MEDICINE | Facility: CLINIC | Age: 71
End: 2021-04-26
Payer: COMMERCIAL

## 2021-04-26 VITALS
OXYGEN SATURATION: 98 % | HEART RATE: 103 BPM | TEMPERATURE: 97.9 F | DIASTOLIC BLOOD PRESSURE: 72 MMHG | SYSTOLIC BLOOD PRESSURE: 124 MMHG | BODY MASS INDEX: 27.77 KG/M2 | WEIGHT: 205 LBS | HEIGHT: 72 IN

## 2021-04-26 DIAGNOSIS — I10 HTN, GOAL BELOW 140/90: Primary | ICD-10-CM

## 2021-04-26 DIAGNOSIS — N52.9 ERECTILE DYSFUNCTION, UNSPECIFIED ERECTILE DYSFUNCTION TYPE: ICD-10-CM

## 2021-04-26 PROCEDURE — 99214 OFFICE O/P EST MOD 30 MIN: CPT | Performed by: FAMILY MEDICINE

## 2021-04-26 RX ORDER — AMLODIPINE BESYLATE 5 MG/1
7.5 TABLET ORAL DAILY
Qty: 135 TABLET | Refills: 1 | Status: SHIPPED | OUTPATIENT
Start: 2021-04-26 | End: 2021-06-22

## 2021-04-26 RX ORDER — SILDENAFIL CITRATE 20 MG/1
20 TABLET ORAL DAILY PRN
Qty: 30 TABLET | Refills: 1 | Status: SHIPPED | OUTPATIENT
Start: 2021-04-26 | End: 2022-04-18

## 2021-04-26 ASSESSMENT — MIFFLIN-ST. JEOR: SCORE: 1727.87

## 2021-04-26 NOTE — PROGRESS NOTES
Assessment & Plan     HTN, goal below 140/90  Well-controlled.  Resume amlodipine at 7.5 mg daily  - amLODIPine (NORVASC) 5 MG tablet; Take 1.5 tablets (7.5 mg) by mouth daily    Erectile dysfunction, unspecified erectile dysfunction type  Recommending a trial of sildenafil for erectile dysfunction.  Side effect profile mechanism of action discussed in detail.  Patient is comfortable trying the medication.  - sildenafil (REVATIO) 20 MG tablet; Take 1 tablet (20 mg) by mouth daily as needed (erectile dysfunction)    BMI:   Estimated body mass index is 27.8 kg/m  as calculated from the following:    Height as of this encounter: 1.829 m (6').    Weight as of this encounter: 93 kg (205 lb).       Return in about 6 months (around 10/15/2021) for Annual Physical Exam, Blood Pressure Recheck.    Demetrio Lawson MD  [employer] PAM CUMMINGS is a 70 year old who presents for the following health issues     HPI     Hypertension Follow-up      Do you check your blood pressure regularly outside of the clinic? Yes     Are you following a low salt diet? Yes    Are your blood pressures ever more than 140 on the top number (systolic) OR more   than 90 on the bottom number (diastolic), for example 140/90? Yes      How many servings of fruits and vegetables do you eat daily?  2-3    On average, how many sweetened beverages do you drink each day (Examples: soda, juice, sweet tea, etc.  Do NOT count diet or artificially sweetened beverages)?   0    How many days per week do you exercise enough to make your heart beat faster? 5    How many minutes a day do you exercise enough to make your heart beat faster? 30 - 60    How many days per week do you miss taking your medication? 0    Patient reports erectile dysfunction.  He would like to try medication for that.    Review of Systems   CONSTITUTIONAL: NEGATIVE for fever, chills, change in weight  ENT/MOUTH: NEGATIVE for ear, mouth and throat  problems  RESP: NEGATIVE for significant cough or SOB  CV: NEGATIVE for chest pain, palpitations or peripheral edema      Objective    /72 (BP Location: Left arm, Cuff Size: Adult Large)   Pulse 103   Temp 97.9  F (36.6  C) (Tympanic)   Ht 1.829 m (6')   Wt 93 kg (205 lb)   SpO2 98%   BMI 27.80 kg/m    Body mass index is 27.8 kg/m .  Physical Exam   GENERAL: healthy, alert and no distress  NECK: no adenopathy, no asymmetry, masses, or scars and thyroid normal to palpation  RESP: lungs clear to auscultation - no rales, rhonchi or wheezes  CV: regular rate and rhythm, normal S1 S2, no S3 or S4, no murmur, click or rub, no peripheral edema and peripheral pulses strong  ABDOMEN: soft, nontender, no hepatosplenomegaly, no masses and bowel sounds normal  MS: no gross musculoskeletal defects noted, no edema

## 2021-05-24 VITALS
BODY MASS INDEX: 27.98 KG/M2 | RESPIRATION RATE: 18 BRPM | HEART RATE: 73 BPM | WEIGHT: 206.3 LBS | TEMPERATURE: 97.9 F | DIASTOLIC BLOOD PRESSURE: 89 MMHG | OXYGEN SATURATION: 97 % | SYSTOLIC BLOOD PRESSURE: 134 MMHG

## 2021-05-24 DIAGNOSIS — C83.07 SPLENIC MARGINAL ZONE B-CELL LYMPHOMA (H): ICD-10-CM

## 2021-05-24 LAB
ALBUMIN SERPL-MCNC: 3.5 G/DL (ref 3.4–5)
ALP SERPL-CCNC: 75 U/L (ref 40–150)
ALT SERPL W P-5'-P-CCNC: 17 U/L (ref 0–70)
ANION GAP SERPL CALCULATED.3IONS-SCNC: 7 MMOL/L (ref 3–14)
AST SERPL W P-5'-P-CCNC: 10 U/L (ref 0–45)
BASOPHILS # BLD AUTO: 0 10E9/L (ref 0–0.2)
BASOPHILS NFR BLD AUTO: 0.6 %
BILIRUB SERPL-MCNC: 0.7 MG/DL (ref 0.2–1.3)
BUN SERPL-MCNC: 12 MG/DL (ref 7–30)
CALCIUM SERPL-MCNC: 8.1 MG/DL (ref 8.5–10.1)
CHLORIDE SERPL-SCNC: 107 MMOL/L (ref 94–109)
CO2 SERPL-SCNC: 24 MMOL/L (ref 20–32)
CREAT SERPL-MCNC: 0.84 MG/DL (ref 0.66–1.25)
DIFFERENTIAL METHOD BLD: ABNORMAL
EOSINOPHIL # BLD AUTO: 0.1 10E9/L (ref 0–0.7)
EOSINOPHIL NFR BLD AUTO: 1.4 %
ERYTHROCYTE [DISTWIDTH] IN BLOOD BY AUTOMATED COUNT: 13.2 % (ref 10–15)
GFR SERPL CREATININE-BSD FRML MDRD: 88 ML/MIN/{1.73_M2}
GLUCOSE SERPL-MCNC: 102 MG/DL (ref 70–99)
HCT VFR BLD AUTO: 42.9 % (ref 40–53)
HGB BLD-MCNC: 14.6 G/DL (ref 13.3–17.7)
IMM GRANULOCYTES # BLD: 0 10E9/L (ref 0–0.4)
IMM GRANULOCYTES NFR BLD: 0.3 %
LDH SERPL L TO P-CCNC: 158 U/L (ref 85–227)
LYMPHOCYTES # BLD AUTO: 1.1 10E9/L (ref 0.8–5.3)
LYMPHOCYTES NFR BLD AUTO: 17.1 %
MCH RBC QN AUTO: 33.3 PG (ref 26.5–33)
MCHC RBC AUTO-ENTMCNC: 34 G/DL (ref 31.5–36.5)
MCV RBC AUTO: 98 FL (ref 78–100)
MONOCYTES # BLD AUTO: 0.8 10E9/L (ref 0–1.3)
MONOCYTES NFR BLD AUTO: 11.6 %
NEUTROPHILS # BLD AUTO: 4.4 10E9/L (ref 1.6–8.3)
NEUTROPHILS NFR BLD AUTO: 69 %
NRBC # BLD AUTO: 0 10*3/UL
NRBC BLD AUTO-RTO: 0 /100
PLATELET # BLD AUTO: 273 10E9/L (ref 150–450)
POTASSIUM SERPL-SCNC: 4.1 MMOL/L (ref 3.4–5.3)
PROT SERPL-MCNC: 7.2 G/DL (ref 6.8–8.8)
RBC # BLD AUTO: 4.38 10E12/L (ref 4.4–5.9)
SODIUM SERPL-SCNC: 138 MMOL/L (ref 133–144)
WBC # BLD AUTO: 6.4 10E9/L (ref 4–11)

## 2021-05-24 PROCEDURE — 80053 COMPREHEN METABOLIC PANEL: CPT | Performed by: INTERNAL MEDICINE

## 2021-05-24 PROCEDURE — 83615 LACTATE (LD) (LDH) ENZYME: CPT | Performed by: INTERNAL MEDICINE

## 2021-05-24 PROCEDURE — 85025 COMPLETE CBC W/AUTO DIFF WBC: CPT | Performed by: INTERNAL MEDICINE

## 2021-05-24 ASSESSMENT — PAIN SCALES - GENERAL: PAINLEVEL: NO PAIN (0)

## 2021-05-24 NOTE — NURSING NOTE
Chief Complaint   Patient presents with     Blood Draw     Labs drawn from  by RN in lab. Vitals taken.      Labs drawn by venipuncture by RN in lab.  Vital signs taken.      Ying Albert RN

## 2021-05-25 NOTE — PROGRESS NOTES
ZOILA is a 70 year old who is being evaluated via a billable video visit.      How would you like to obtain your AVS? MyChart  If the video visit is dropped, the invitation should be resent by: Text to cell phone: [phone]  Will anyone else be joining your video visit? No      I have reviewed and updated the patient's allergies and medication list.    Concerns: No new concerns.   Refills: None needed.        Vitals - Patient Reported  Weight (Patient Reported): 93.4 kg (206 lb)  Height (Patient Reported): 182.9 cm (6')  BMI (Based on Pt Reported Ht/Wt): 27.94  Pain Score: No Pain (0)    Vicenta Delgado CMA    Video-Visit Details    Type of service:  Video Visit    Originating Location (pt. Location): Home    Distant Location (provider location):  [facility]     Platform used for Video Visit: [employer]        REASON FOR VISIT:  Management of splenic marginal zone lymphoma.       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  To summarize his course, he was noted to have progressive leukocytosis on routine blood work in mid 2013.  PET/CT revealed splenomegaly and lymphadenopathy in the axilla, pretracheal area, and portacaval area.  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  He was initially monitored without treatment.  After developing abdominal pain with massive splenomegaly, weight loss, and night sweats he was started on rituximab in 7/2014.  He had difficulty tolerating rituximab due to severe infusion reaction, so he was treated with CVP alone and then rituximab was successfully added with later cycles.  Otherwise, he tolerated treatment very well and completed 6 cycles at the end of 10/2014.  Video visit for ongoing follow up.    His wife, Trevor, was not with him today.  Since his last  visit he has been feeling well.  Energy level and appetite are good.  Weight stable.  No fevers.  Does deal with longstanding night sweats.  No headache or focal weakness or sensory changes.  No dyspnea, cough, or chest pain.  No abdominal pain and normal bowel function.  No bleeding, bruising, or skin rashes.  No pain.  No lumps or bumps. 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  Still laying low due to the pandemic.      MEDICATIONS:  Current Outpatient Medications   Medication     amLODIPine (NORVASC) 5 MG tablet     levothyroxine (SYNTHROID/LEVOTHROID) 112 MCG tablet     multivitamin, therapeutic with minerals (MULTI-VITAMIN) TABS     sildenafil (REVATIO) 20 MG tablet     No current facility-administered medications for this visit.      PHYSICAL EXAMINATION:  There were no vitals taken for this visit.  Wt Readings from Last 5 Encounters:   05/24/21 93.6 kg (206 lb 4.8 oz)   04/26/21 93 kg (205 lb)   11/17/20 99.2 kg (218 lb 9.6 oz)   10/12/20 99.3 kg (219 lb)   04/29/20 99.3 kg (218 lb 14.4 oz)     General: Well appearing.  HEENT: Sclerae anicteric.  Lungs: Breathing comfortably. No cough.  MSK: Grossly normal movement.  Neuro: Grossly non-focal.  Skin/access: Normal skin tone.  Psych: Alert and oriented. No distress.  Performance status: ECOG 0    The rest of a comprehensive physical examination is deferred due to PHE (public health emergency) video visit restrictions    LABORATORY DATA:  Reviewed by me  Recent Labs   Lab Test 05/24/21  0629 11/17/20  0710 04/29/20  0809 02/24/14  0826 02/24/14  0826 11/25/13  1009 11/25/13  1009 07/24/13  0755   WBC 6.4 5.8 4.8   < > 13.0*   < > 26.7* 15.9*   HGB 14.6 15.6 14.3   < > 12.7*   < > 14.2 13.9    263 220   < > 212   < > 245 227   ANEU 4.4 3.6 2.9   < > 4.3   < > 6.9 5.9   ALYM 1.1 1.2 1.1   < > 7.0*   < > 17.7* 7.5*   RETICABSCT  --   --   --   --  104.4*  --  80.0 59.2    < > =  values in this interval not displayed.     Recent Labs   Lab Test 05/24/21  0629 11/17/20  0710 10/12/20  0746 04/29/20  0809 05/18/15  0711 05/18/15  0711 11/17/14  1204 11/17/14  1204 10/27/14  1037 07/18/14  0906 07/18/14  0906    135 138 141   < > 140   < > 141 139   < > 142   POTASSIUM 4.1 3.9 4.6 4.5   < > 4.9   < > 4.0 4.3   < > 4.5   CHLORIDE 107 105 107 110*   < > 106   < > 107 107   < > 102   CO2 24 26 26 29   < > 27   < > 24 26   < > 29   BUN 12 12 14 14   < > 13   < > 8 13   < > 16   CR 0.84 1.03 1.06 0.94   < > 0.89   < > 1.05 0.94   < > 0.89   RACHEAL 8.1* 8.8 9.2 8.4*   < > 8.6   < > 9.1 9.5   < > 9.0   PHOS  --   --   --   --   --   --   --   --   --   --  2.9   URIC  --   --   --   --   --  6.1  --  6.3 5.8   < > 5.0    170  --  182   < > 194   < > 183 218   < > 464    < > = values in this interval not displayed.     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      In regards to his lymphoma, he clinically remains in remission.  Blood counts look great.  There is nothing to suggest lymphoma recurrence or complications.  We will continue to monitor.       He has no active infectious issues.  He is up to date with the seasonal flu shot and pneumococcal vaccines.  I recommended the Shingrix series.  We discussed the importance of following local and federal guidance regarding measures to reduce the risk of COVID-19 given the increased risk of complications in the setting of an immunocompromised state.  He thinks he had COVID-19 at the beginning of the pandemic in [place] but recovered and has completed a COVID-19 vaccine.     He will continue to work with Dr. Lawson for his health maintenance and other medical issues including hypertension.        I plan to see him again in about 6 months with labs.  I  reminded him to contact us in the interim if questions, concerns or new issues arise.     Video visit start time: 7:57 AM  Video visit end time: 8:12 AM  Video visit duration: 15 minutes    Carlos Beebe MD, PhD  Attending Physician, [facility]   of Medicine, [facility]  Division of Hematology, Oncology, and Transplantation  [phone] clinic

## 2021-05-26 ENCOUNTER — VIRTUAL VISIT (OUTPATIENT)
Dept: ONCOLOGY | Facility: CLINIC | Age: 71
End: 2021-05-26
Attending: INTERNAL MEDICINE
Payer: COMMERCIAL

## 2021-05-26 DIAGNOSIS — I10 HTN, GOAL BELOW 140/90: ICD-10-CM

## 2021-05-26 DIAGNOSIS — C83.07 SPLENIC MARGINAL ZONE B-CELL LYMPHOMA (H): Primary | ICD-10-CM

## 2021-05-26 PROCEDURE — 999N001193 HC VIDEO/TELEPHONE VISIT; NO CHARGE

## 2021-05-26 PROCEDURE — 99213 OFFICE O/P EST LOW 20 MIN: CPT | Mod: GT | Performed by: INTERNAL MEDICINE

## 2021-05-26 NOTE — LETTER
5/26/2021         RE: Moose Serrano  [address] [address]        Dear Colleague,    Thank you for referring your patient, Moose Serrano, to the [facility]. Please see a copy of my visit note below.    ZOILA is a 70 year old who is being evaluated via a billable video visit.      How would you like to obtain your AVS? MyChart  If the video visit is dropped, the invitation should be resent by: Text to cell phone: [phone]  Will anyone else be joining your video visit? No      I have reviewed and updated the patient's allergies and medication list.    Concerns: No new concerns.   Refills: None needed.        Vitals - Patient Reported  Weight (Patient Reported): 93.4 kg (206 lb)  Height (Patient Reported): 182.9 cm (6')  BMI (Based on Pt Reported Ht/Wt): 27.94  Pain Score: No Pain (0)    Vicenta Delgado CMA    Video-Visit Details    Type of service:  Video Visit    Originating Location (pt. Location): Home    Distant Location (provider location):  [facility]     Platform used for Video Visit: [employer]        REASON FOR VISIT:  Management of splenic marginal zone lymphoma.       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  To summarize his course, he was noted to have progressive leukocytosis on routine blood work in mid 2013.  PET/CT revealed splenomegaly and lymphadenopathy in the axilla, pretracheal area, and portacaval area.  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  He was initially monitored without treatment.  After developing abdominal pain with massive splenomegaly, weight loss, and night sweats he was started on rituximab in 7/2014.  He had difficulty tolerating rituximab due to severe infusion reaction, so he was treated with CVP  alone and then rituximab was successfully added with later cycles.  Otherwise, he tolerated treatment very well and completed 6 cycles at the end of 10/2014.  Video visit for ongoing follow up.    His wife, Trevor, was not with him today.  Since his last visit he has been feeling well.  Energy level and appetite are good.  Weight stable.  No fevers.  Does deal with longstanding night sweats.  No headache or focal weakness or sensory changes.  No dyspnea, cough, or chest pain.  No abdominal pain and normal bowel function.  No bleeding, bruising, or skin rashes.  No pain.  No lumps or bumps. 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  Still laying low due to the pandemic.      MEDICATIONS:  Current Outpatient Medications   Medication     amLODIPine (NORVASC) 5 MG tablet     levothyroxine (SYNTHROID/LEVOTHROID) 112 MCG tablet     multivitamin, therapeutic with minerals (MULTI-VITAMIN) TABS     sildenafil (REVATIO) 20 MG tablet     No current facility-administered medications for this visit.      PHYSICAL EXAMINATION:  There were no vitals taken for this visit.  Wt Readings from Last 5 Encounters:   05/24/21 93.6 kg (206 lb 4.8 oz)   04/26/21 93 kg (205 lb)   11/17/20 99.2 kg (218 lb 9.6 oz)   10/12/20 99.3 kg (219 lb)   04/29/20 99.3 kg (218 lb 14.4 oz)     General: Well appearing.  HEENT: Sclerae anicteric.  Lungs: Breathing comfortably. No cough.  MSK: Grossly normal movement.  Neuro: Grossly non-focal.  Skin/access: Normal skin tone.  Psych: Alert and oriented. No distress.  Performance status: ECOG 0    The rest of a comprehensive physical examination is deferred due to PHE (public health emergency) video visit restrictions    LABORATORY DATA:  Reviewed by me  Recent Labs   Lab Test 05/24/21  0629 11/17/20  0710 04/29/20  0809 02/24/14  0826 02/24/14  0826 11/25/13  1009 11/25/13  1009 07/24/13  0755   WBC 6.4 5.8 4.8   < > 13.0*   < > 26.7*  15.9*   HGB 14.6 15.6 14.3   < > 12.7*   < > 14.2 13.9    263 220   < > 212   < > 245 227   ANEU 4.4 3.6 2.9   < > 4.3   < > 6.9 5.9   ALYM 1.1 1.2 1.1   < > 7.0*   < > 17.7* 7.5*   RETICABSCT  --   --   --   --  104.4*  --  80.0 59.2    < > = values in this interval not displayed.     Recent Labs   Lab Test 05/24/21  0629 11/17/20  0710 10/12/20  0746 04/29/20  0809 05/18/15  0711 05/18/15  0711 11/17/14  1204 11/17/14  1204 10/27/14  1037 07/18/14  0906 07/18/14  0906    135 138 141   < > 140   < > 141 139   < > 142   POTASSIUM 4.1 3.9 4.6 4.5   < > 4.9   < > 4.0 4.3   < > 4.5   CHLORIDE 107 105 107 110*   < > 106   < > 107 107   < > 102   CO2 24 26 26 29   < > 27   < > 24 26   < > 29   BUN 12 12 14 14   < > 13   < > 8 13   < > 16   CR 0.84 1.03 1.06 0.94   < > 0.89   < > 1.05 0.94   < > 0.89   RACHEAL 8.1* 8.8 9.2 8.4*   < > 8.6   < > 9.1 9.5   < > 9.0   PHOS  --   --   --   --   --   --   --   --   --   --  2.9   URIC  --   --   --   --   --  6.1  --  6.3 5.8   < > 5.0    170  --  182   < > 194   < > 183 218   < > 464    < > = values in this interval not displayed.     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      In regards to his lymphoma, he clinically remains in remission.  Blood counts look great.  There is nothing to suggest lymphoma recurrence or complications.  We will continue to monitor.       He has no active infectious issues.  He is up to date with the seasonal flu shot and pneumococcal vaccines.  I recommended the Shingrix series.  We discussed the importance of following local and federal guidance regarding measures to reduce the risk of COVID-19 given the increased risk of complications in the setting of an immunocompromised state.  He thinks he had COVID-19 at the beginning of the  pandemic in [place] but recovered and has completed a COVID-19 vaccine.     He will continue to work with Dr. Lawson for his health maintenance and other medical issues including hypertension.        I plan to see him again in about 6 months with labs.  I reminded him to contact us in the interim if questions, concerns or new issues arise.     Video visit start time: 7:57 AM  Video visit end time: 8:12 AM  Video visit duration: 15 minutes    Carlos Beebe MD, PhD  Attending Physician, [facility]   of Medicine, [facility]  Division of Hematology, Oncology, and Transplantation  [phone] clinic          Again, thank you for allowing me to participate in the care of your patient.        Sincerely,        Carlos Beebe MD

## 2021-06-21 DIAGNOSIS — I10 HTN, GOAL BELOW 140/90: ICD-10-CM

## 2021-06-21 RX ORDER — AMLODIPINE BESYLATE 5 MG/1
TABLET ORAL
Qty: 45 TABLET | Refills: 0 | OUTPATIENT
Start: 2021-06-21

## 2021-06-22 RX ORDER — AMLODIPINE BESYLATE 5 MG/1
TABLET ORAL
Qty: 45 TABLET | Refills: 0 | Status: SHIPPED | OUTPATIENT
Start: 2021-06-22 | End: 2021-07-29

## 2021-06-22 NOTE — TELEPHONE ENCOUNTER
Prescription approved per [facility] Refill Protocol.    Ivett SALES RN  EP Triage

## 2021-07-28 DIAGNOSIS — I10 HTN, GOAL BELOW 140/90: ICD-10-CM

## 2021-07-29 RX ORDER — AMLODIPINE BESYLATE 5 MG/1
TABLET ORAL
Qty: 45 TABLET | Refills: 8 | Status: SHIPPED | OUTPATIENT
Start: 2021-07-29 | End: 2021-08-30

## 2021-08-30 DIAGNOSIS — I10 HTN, GOAL BELOW 140/90: ICD-10-CM

## 2021-09-01 RX ORDER — AMLODIPINE BESYLATE 5 MG/1
TABLET ORAL
Qty: 135 TABLET | Refills: 1 | Status: SHIPPED | OUTPATIENT
Start: 2021-09-01 | End: 2021-11-26

## 2021-11-26 DIAGNOSIS — E03.4 HYPOTHYROIDISM DUE TO ACQUIRED ATROPHY OF THYROID: ICD-10-CM

## 2021-11-26 DIAGNOSIS — I10 HTN, GOAL BELOW 140/90: ICD-10-CM

## 2021-11-26 RX ORDER — LEVOTHYROXINE SODIUM 112 UG/1
TABLET ORAL
Qty: 90 TABLET | Refills: 0 | Status: SHIPPED | OUTPATIENT
Start: 2021-11-26 | End: 2022-02-21

## 2021-11-26 RX ORDER — AMLODIPINE BESYLATE 5 MG/1
TABLET ORAL
Qty: 135 TABLET | Refills: 0 | Status: SHIPPED | OUTPATIENT
Start: 2021-11-26 | End: 2022-04-18

## 2021-11-26 NOTE — TELEPHONE ENCOUNTER
Routing refill request to provider for review/approval because:  Labs not current:  TSH  Kari CARLOS RN  [facility]

## 2021-11-26 NOTE — TELEPHONE ENCOUNTER
Prescription approved per [facility] Refill Protocol.  Kari CARLOS RN  [facility]

## 2022-01-10 NOTE — PROGRESS NOTES
REASON FOR VISIT:  Management of splenic marginal zone lymphoma.       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  To summarize his course, he was noted to have progressive leukocytosis on routine blood work in mid 2013.  PET/CT revealed splenomegaly and lymphadenopathy in the axilla, pretracheal area, and portacaval area.  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  He was initially monitored without treatment.  After developing abdominal pain with massive splenomegaly, weight loss, and night sweats he was started on rituximab in 7/2014.  He had difficulty tolerating rituximab due to severe infusion reaction, so he was treated with CVP alone and then rituximab was successfully added with later cycles.  Otherwise, he tolerated treatment very well and completed 6 cycles at the end of 10/2014.  Video visit for management of lymphoma.    His wife, Trevor, was not with him today.  Since his last visit he has been feeling well.  Energy level and appetite are good. Longstanding night sweats unchanged.  No abdominal pain and normal bowel function.  No bleeding, bruising, or skin rashes.  No pain.  No lumps or bumps. He continues to be physically active - he gets exercise from his job ([employer]).  Back traveling a lot for work.  Still laying low due to the pandemic.      MEDICATIONS:  Current Outpatient Medications   Medication     amLODIPine (NORVASC) 5 MG tablet     levothyroxine (SYNTHROID/LEVOTHROID) 112 MCG tablet     multivitamin, therapeutic with minerals (MULTI-VITAMIN) TABS     sildenafil (REVATIO) 20 MG tablet     No current facility-administered medications for this visit.     PHYSICAL EXAMINATION:  BP (!) 144/89   Pulse 95   Temp 97.9  F (36.6  C) (Oral)   Resp 16   Wt 97.7 kg (215 lb 4.8 oz)   SpO2 95%    BMI 29.20 kg/m    Wt Readings from Last 5 Encounters:   01/12/22 97.7 kg (215 lb 4.8 oz)   05/24/21 93.6 kg (206 lb 4.8 oz)   04/26/21 93 kg (205 lb)   11/17/20 99.2 kg (218 lb 9.6 oz)   10/12/20 99.3 kg (219 lb)     General: Well appearing. No distress.  HEENT: Sclerae anicteric. No OP lesions, masses, or tonsilar enlargement.  Lungs: Clear bilaterally without wheezing or crackles.  Heart: Regular rate and rhythm.  Gastrointestinal: Bowel sounds present, no tenderness to palpation, spleen tip not palpable.  Extremities: No lower extremity edema.  Lymph:  No cervical, clavicular, axillary, epitrochlear, or inguinal lymphadenopathy.  Performance status: ECOG 0    LABORATORY DATA:  Reviewed by me  Recent Labs   Lab Test 01/12/22  0852 05/24/21  0629 11/17/20  0710 04/29/20  0809 03/31/14  0743 02/24/14  0826 12/12/13  0842 11/25/13  1009   WBC 9.0 6.4 5.8 4.8   < > 13.0*   < > 26.7*   HGB 15.4 14.6 15.6 14.3   < > 12.7*   < > 14.2    273 263 220   < > 212   < > 245   ANEU  --  4.4 3.6 2.9   < > 4.3   < > 6.9   ANEUTAUTO 6.5  --   --   --   --   --   --   --    ALYM  --  1.1 1.2 1.1   < > 7.0*   < > 17.7*   ALYMPAUTO 1.5  --   --   --   --   --   --   --    RETICABSCT  --   --   --   --   --  104.4*  --  80.0    < > = values in this interval not displayed.     Recent Labs   Lab Test 01/12/22  0852 05/24/21  0629 11/17/20  0710 08/17/15  0710 05/18/15  0711 02/23/15  0810 11/17/14  1204 10/27/14  1037 07/21/14  0756 07/18/14  0906    138 135   < > 140   < > 141 139   < > 142   POTASSIUM 4.0 4.1 3.9   < > 4.9   < > 4.0 4.3   < > 4.5   CHLORIDE 107 107 105   < > 106   < > 107 107   < > 102   CO2 27 24 26   < > 27   < > 24 26   < > 29   BUN 10 12 12   < > 13   < > 8 13   < > 16   CR 1.00 0.84 1.03   < > 0.89   < > 1.05 0.94   < > 0.89   RACHEAL 9.0 8.1* 8.8   < > 8.6   < > 9.1 9.5   < > 9.0   PHOS  --   --   --   --   --   --   --   --   --  2.9   URIC  --   --   --   --  6.1  --  6.3 5.8   < > 5.0    158  170   < > 194   < > 183 218   < > 464    < > = values in this interval not displayed.     Recent Labs   Lab Test 01/12/22  0852 05/24/21  0629 11/17/20  0710   AST 8 10 11   ALT 24 17 25   ALKPHOS 73 75 71   BILITOTAL 0.6 0.7 0.8       IMPRESSION AND PLAN:  Mr. 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      His lymphoma clinically remains in remission.  Blood counts look great.  There is nothing to suggest lymphoma recurrence or complications.  We will continue to monitor.       He has no active ID issues.  He is up to date with the seasonal flu shot and pneumococcal vaccines.  I recommended the Shingrix series and he declined.  We discussed the importance of following local and federal guidance regarding measures to reduce the risk of COVID-19 given the increased risk of complications in the setting of an immunocompromised state.  He completed a COVID-19 vaccine and booster.     He will continue to work with Dr. Lawson for his health maintenance and other medical issues including hypertension.        I plan to see him again in about 6 months with labs.  I reminded him to contact us in the interim if questions, concerns or new issues arise.     Carlos Beebe MD, PhD  Attending Physician, [facility]   of Medicine, [facility]  Division of Hematology, Oncology, and Transplantation  [phone] [facility]

## 2022-01-12 ENCOUNTER — APPOINTMENT (OUTPATIENT)
Dept: LAB | Facility: CLINIC | Age: 72
End: 2022-01-12
Attending: INTERNAL MEDICINE
Payer: COMMERCIAL

## 2022-01-12 ENCOUNTER — ONCOLOGY VISIT (OUTPATIENT)
Dept: ONCOLOGY | Facility: CLINIC | Age: 72
End: 2022-01-12
Attending: INTERNAL MEDICINE
Payer: COMMERCIAL

## 2022-01-12 VITALS
DIASTOLIC BLOOD PRESSURE: 89 MMHG | WEIGHT: 215.3 LBS | HEART RATE: 95 BPM | OXYGEN SATURATION: 95 % | BODY MASS INDEX: 29.2 KG/M2 | RESPIRATION RATE: 16 BRPM | TEMPERATURE: 97.9 F | SYSTOLIC BLOOD PRESSURE: 144 MMHG

## 2022-01-12 DIAGNOSIS — C83.07 SPLENIC MARGINAL ZONE B-CELL LYMPHOMA (H): Primary | ICD-10-CM

## 2022-01-12 LAB
ALBUMIN SERPL-MCNC: 3.9 G/DL (ref 3.4–5)
ALP SERPL-CCNC: 73 U/L (ref 40–150)
ALT SERPL W P-5'-P-CCNC: 24 U/L (ref 0–70)
ANION GAP SERPL CALCULATED.3IONS-SCNC: 7 MMOL/L (ref 3–14)
AST SERPL W P-5'-P-CCNC: 8 U/L (ref 0–45)
BASOPHILS # BLD AUTO: 0.1 10E3/UL (ref 0–0.2)
BASOPHILS NFR BLD AUTO: 1 %
BILIRUB SERPL-MCNC: 0.6 MG/DL (ref 0.2–1.3)
BUN SERPL-MCNC: 10 MG/DL (ref 7–30)
CALCIUM SERPL-MCNC: 9 MG/DL (ref 8.5–10.1)
CHLORIDE BLD-SCNC: 107 MMOL/L (ref 94–109)
CO2 SERPL-SCNC: 27 MMOL/L (ref 20–32)
CREAT SERPL-MCNC: 1 MG/DL (ref 0.66–1.25)
EOSINOPHIL # BLD AUTO: 0.1 10E3/UL (ref 0–0.7)
EOSINOPHIL NFR BLD AUTO: 1 %
ERYTHROCYTE [DISTWIDTH] IN BLOOD BY AUTOMATED COUNT: 12.3 % (ref 10–15)
GFR SERPL CREATININE-BSD FRML MDRD: 80 ML/MIN/1.73M2
GLUCOSE BLD-MCNC: 96 MG/DL (ref 70–99)
HCT VFR BLD AUTO: 46.1 % (ref 40–53)
HGB BLD-MCNC: 15.4 G/DL (ref 13.3–17.7)
IMM GRANULOCYTES # BLD: 0 10E3/UL
IMM GRANULOCYTES NFR BLD: 0 %
LDH SERPL L TO P-CCNC: 165 U/L (ref 85–227)
LYMPHOCYTES # BLD AUTO: 1.5 10E3/UL (ref 0.8–5.3)
LYMPHOCYTES NFR BLD AUTO: 16 %
MCH RBC QN AUTO: 32.8 PG (ref 26.5–33)
MCHC RBC AUTO-ENTMCNC: 33.4 G/DL (ref 31.5–36.5)
MCV RBC AUTO: 98 FL (ref 78–100)
MONOCYTES # BLD AUTO: 0.8 10E3/UL (ref 0–1.3)
MONOCYTES NFR BLD AUTO: 8 %
NEUTROPHILS # BLD AUTO: 6.5 10E3/UL (ref 1.6–8.3)
NEUTROPHILS NFR BLD AUTO: 74 %
NRBC # BLD AUTO: 0 10E3/UL
NRBC BLD AUTO-RTO: 0 /100
PLATELET # BLD AUTO: 277 10E3/UL (ref 150–450)
POTASSIUM BLD-SCNC: 4 MMOL/L (ref 3.4–5.3)
PROT SERPL-MCNC: 7.9 G/DL (ref 6.8–8.8)
RBC # BLD AUTO: 4.69 10E6/UL (ref 4.4–5.9)
SODIUM SERPL-SCNC: 141 MMOL/L (ref 133–144)
WBC # BLD AUTO: 9 10E3/UL (ref 4–11)

## 2022-01-12 PROCEDURE — 99213 OFFICE O/P EST LOW 20 MIN: CPT | Performed by: INTERNAL MEDICINE

## 2022-01-12 PROCEDURE — G0463 HOSPITAL OUTPT CLINIC VISIT: HCPCS

## 2022-01-12 PROCEDURE — 36415 COLL VENOUS BLD VENIPUNCTURE: CPT | Performed by: INTERNAL MEDICINE

## 2022-01-12 PROCEDURE — 82040 ASSAY OF SERUM ALBUMIN: CPT | Performed by: INTERNAL MEDICINE

## 2022-01-12 PROCEDURE — 80053 COMPREHEN METABOLIC PANEL: CPT | Performed by: INTERNAL MEDICINE

## 2022-01-12 PROCEDURE — 85025 COMPLETE CBC W/AUTO DIFF WBC: CPT | Performed by: INTERNAL MEDICINE

## 2022-01-12 PROCEDURE — 83615 LACTATE (LD) (LDH) ENZYME: CPT | Performed by: INTERNAL MEDICINE

## 2022-01-12 ASSESSMENT — PAIN SCALES - GENERAL: PAINLEVEL: NO PAIN (0)

## 2022-01-12 NOTE — NURSING NOTE
Chief Complaint   Patient presents with     Blood Draw     vpt blood draw, vitals taken, checked into next appointment     Venipuncture labs drawn from right arm.    Yaima Serrano MA

## 2022-01-12 NOTE — LETTER
1/12/2022         RE: Moose Serrano  [address]  [place] [address]        Dear Colleague,    Thank you for referring your patient, Moose Serrano, to the [facility]. Please see a copy of my visit note below.    REASON FOR VISIT:  Management of splenic marginal zone lymphoma.       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  To summarize his course, he was noted to have progressive leukocytosis on routine blood work in mid 2013.  PET/CT revealed splenomegaly and lymphadenopathy in the axilla, pretracheal area, and portacaval area.  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  He was initially monitored without treatment.  After developing abdominal pain with massive splenomegaly, weight loss, and night sweats he was started on rituximab in 7/2014.  He had difficulty tolerating rituximab due to severe infusion reaction, so he was treated with CVP alone and then rituximab was successfully added with later cycles.  Otherwise, he tolerated treatment very well and completed 6 cycles at the end of 10/2014.  Video visit for management of lymphoma.    His wife, Trevor, was not with him today.  Since his last visit he has been feeling well.  Energy level and appetite are good. Longstanding night sweats unchanged.  No abdominal pain and normal bowel function.  No bleeding, bruising, or skin rashes.  No pain.  No lumps or bumps. 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  Still laying low due to the pandemic.      MEDICATIONS:  Current Outpatient Medications   Medication     amLODIPine (NORVASC) 5 MG tablet     levothyroxine (SYNTHROID/LEVOTHROID) 112 MCG tablet     multivitamin, therapeutic with minerals  (MULTI-VITAMIN) TABS     sildenafil (REVATIO) 20 MG tablet     No current facility-administered medications for this visit.     PHYSICAL EXAMINATION:  BP (!) 144/89   Pulse 95   Temp 97.9  F (36.6  C) (Oral)   Resp 16   Wt 97.7 kg (215 lb 4.8 oz)   SpO2 95%   BMI 29.20 kg/m    Wt Readings from Last 5 Encounters:   01/12/22 97.7 kg (215 lb 4.8 oz)   05/24/21 93.6 kg (206 lb 4.8 oz)   04/26/21 93 kg (205 lb)   11/17/20 99.2 kg (218 lb 9.6 oz)   10/12/20 99.3 kg (219 lb)     General: Well appearing. No distress.  HEENT: Sclerae anicteric. No OP lesions, masses, or tonsilar enlargement.  Lungs: Clear bilaterally without wheezing or crackles.  Heart: Regular rate and rhythm.  Gastrointestinal: Bowel sounds present, no tenderness to palpation, spleen tip not palpable.  Extremities: No lower extremity edema.  Lymph:  No cervical, clavicular, axillary, epitrochlear, or inguinal lymphadenopathy.  Performance status: ECOG 0    LABORATORY DATA:  Reviewed by me  Recent Labs   Lab Test 01/12/22  0852 05/24/21  0629 11/17/20  0710 04/29/20  0809 03/31/14  0743 02/24/14  0826 12/12/13  0842 11/25/13  1009   WBC 9.0 6.4 5.8 4.8   < > 13.0*   < > 26.7*   HGB 15.4 14.6 15.6 14.3   < > 12.7*   < > 14.2    273 263 220   < > 212   < > 245   ANEU  --  4.4 3.6 2.9   < > 4.3   < > 6.9   ANEUTAUTO 6.5  --   --   --   --   --   --   --    ALYM  --  1.1 1.2 1.1   < > 7.0*   < > 17.7*   ALYMPAUTO 1.5  --   --   --   --   --   --   --    RETICABSCT  --   --   --   --   --  104.4*  --  80.0    < > = values in this interval not displayed.     Recent Labs   Lab Test 01/12/22  0852 05/24/21  0629 11/17/20  0710 08/17/15  0710 05/18/15  0711 02/23/15  0810 11/17/14  1204 10/27/14  1037 07/21/14  0756 07/18/14  0906    138 135   < > 140   < > 141 139   < > 142   POTASSIUM 4.0 4.1 3.9   < > 4.9   < > 4.0 4.3   < > 4.5   CHLORIDE 107 107 105   < > 106   < > 107 107   < > 102   CO2 27 24 26   < > 27   < > 24 26   < > 29   BUN 10 12 12    < > 13   < > 8 13   < > 16   CR 1.00 0.84 1.03   < > 0.89   < > 1.05 0.94   < > 0.89   RACHEAL 9.0 8.1* 8.8   < > 8.6   < > 9.1 9.5   < > 9.0   PHOS  --   --   --   --   --   --   --   --   --  2.9   URIC  --   --   --   --  6.1  --  6.3 5.8   < > 5.0    158 170   < > 194   < > 183 218   < > 464    < > = values in this interval not displayed.     Recent Labs   Lab Test 01/12/22  0852 05/24/21  0629 11/17/20  0710   AST 8 10 11   ALT 24 17 25   ALKPHOS 73 75 71   BILITOTAL 0.6 0.7 0.8       IMPRESSION AND PLAN:  Mr. 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      His lymphoma clinically remains in remission.  Blood counts look great.  There is nothing to suggest lymphoma recurrence or complications.  We will continue to monitor.       He has no active ID issues.  He is up to date with the seasonal flu shot and pneumococcal vaccines.  I recommended the Shingrix series and he declined.  We discussed the importance of following local and federal guidance regarding measures to reduce the risk of COVID-19 given the increased risk of complications in the setting of an immunocompromised state.  He completed a COVID-19 vaccine and booster.     He will continue to work with Dr. Lawson for his health maintenance and other medical issues including hypertension.        I plan to see him again in about 6 months with labs.  I reminded him to contact us in the interim if questions, concerns or new issues arise.     Carlos Beebe MD, PhD  Attending Physician, [facility]   of Medicine, [facility]  Division of Hematology, Oncology, and Transplantation  [phone] [facility]

## 2022-01-12 NOTE — NURSING NOTE
Oncology Rooming Note    January 12, 2022 9:27 AM   Moose Serrano is a 71 year old male who presents for:    Chief Complaint   Patient presents with     Blood Draw     vpt blood draw, vitals taken, checked into next appointment     Oncology Clinic Visit     [facility] RETURN - SPLENIC MARGINAL ZONE B CELL LYMPHOMA     Initial Vitals: BP (!) 144/89   Pulse 95   Temp 97.9  F (36.6  C) (Oral)   Resp 16   Wt 97.7 kg (215 lb 4.8 oz)   SpO2 95%   BMI 29.20 kg/m   Estimated body mass index is 29.2 kg/m  as calculated from the following:    Height as of 4/26/21: 1.829 m (6').    Weight as of this encounter: 97.7 kg (215 lb 4.8 oz). Body surface area is 2.23 meters squared.  No Pain (0) Comment: Data Unavailable   No LMP for male patient.  Allergies reviewed: Yes  Medications reviewed: Yes    Medications: Medication refills not needed today.  Pharmacy name entered into EPIC:    [facility] [place] [address] [facility] - [place], [place] - [address] [place] [address] [id number] - [place], [place] - [address]  WRITTEN PRESCRIPTION REQUESTED    Clinical concerns: No new concerns. [facility]      Kiko Norman LPN

## 2022-04-18 ENCOUNTER — OFFICE VISIT (OUTPATIENT)
Dept: FAMILY MEDICINE | Facility: CLINIC | Age: 72
End: 2022-04-18
Payer: COMMERCIAL

## 2022-04-18 VITALS
OXYGEN SATURATION: 94 % | DIASTOLIC BLOOD PRESSURE: 103 MMHG | SYSTOLIC BLOOD PRESSURE: 152 MMHG | RESPIRATION RATE: 14 BRPM | HEART RATE: 82 BPM | BODY MASS INDEX: 29.68 KG/M2 | HEIGHT: 71 IN | WEIGHT: 212 LBS | TEMPERATURE: 97.7 F

## 2022-04-18 DIAGNOSIS — Z12.5 SCREENING FOR PROSTATE CANCER: ICD-10-CM

## 2022-04-18 DIAGNOSIS — Z00.00 ENCOUNTER FOR MEDICARE ANNUAL WELLNESS EXAM: Primary | ICD-10-CM

## 2022-04-18 DIAGNOSIS — I10 HTN, GOAL BELOW 140/90: ICD-10-CM

## 2022-04-18 DIAGNOSIS — Z23 HIGH PRIORITY FOR 2019-NCOV VACCINE: ICD-10-CM

## 2022-04-18 DIAGNOSIS — R05.3 CHRONIC COUGH: ICD-10-CM

## 2022-04-18 DIAGNOSIS — E03.4 HYPOTHYROIDISM DUE TO ACQUIRED ATROPHY OF THYROID: ICD-10-CM

## 2022-04-18 PROCEDURE — 80061 LIPID PANEL: CPT | Performed by: FAMILY MEDICINE

## 2022-04-18 PROCEDURE — 0064A COVID-19,PF,MODERNA (18+ YRS BOOSTER .25ML): CPT | Performed by: FAMILY MEDICINE

## 2022-04-18 PROCEDURE — 80048 BASIC METABOLIC PNL TOTAL CA: CPT | Performed by: FAMILY MEDICINE

## 2022-04-18 PROCEDURE — 99214 OFFICE O/P EST MOD 30 MIN: CPT | Mod: 25 | Performed by: FAMILY MEDICINE

## 2022-04-18 PROCEDURE — 84443 ASSAY THYROID STIM HORMONE: CPT | Performed by: FAMILY MEDICINE

## 2022-04-18 PROCEDURE — 91306 COVID-19,PF,MODERNA (18+ YRS BOOSTER .25ML): CPT | Performed by: FAMILY MEDICINE

## 2022-04-18 PROCEDURE — 36415 COLL VENOUS BLD VENIPUNCTURE: CPT | Performed by: FAMILY MEDICINE

## 2022-04-18 PROCEDURE — G0103 PSA SCREENING: HCPCS | Performed by: FAMILY MEDICINE

## 2022-04-18 PROCEDURE — 99397 PER PM REEVAL EST PAT 65+ YR: CPT | Mod: 25 | Performed by: FAMILY MEDICINE

## 2022-04-18 RX ORDER — AMLODIPINE BESYLATE 10 MG/1
10 TABLET ORAL DAILY
Qty: 90 TABLET | Refills: 0 | Status: SHIPPED | OUTPATIENT
Start: 2022-04-18 | End: 2022-08-26

## 2022-04-18 RX ORDER — LEVOTHYROXINE SODIUM 112 UG/1
112 TABLET ORAL DAILY
Qty: 90 TABLET | Refills: 3 | Status: SHIPPED | OUTPATIENT
Start: 2022-04-18 | End: 2022-08-26

## 2022-04-18 ASSESSMENT — ENCOUNTER SYMPTOMS
WEAKNESS: 0
HEMATURIA: 0
NERVOUS/ANXIOUS: 1
HEADACHES: 0
FREQUENCY: 0
CONSTIPATION: 0
SORE THROAT: 0
ARTHRALGIAS: 1
CHILLS: 0
DIARRHEA: 0
FEVER: 0
PALPITATIONS: 0
EYE PAIN: 0
HEARTBURN: 0
PARESTHESIAS: 0
DYSURIA: 0
MYALGIAS: 0
DIZZINESS: 0
COUGH: 1
ABDOMINAL PAIN: 0
SHORTNESS OF BREATH: 1
HEMATOCHEZIA: 0
NAUSEA: 0
JOINT SWELLING: 1

## 2022-04-18 ASSESSMENT — PATIENT HEALTH QUESTIONNAIRE - PHQ9
SUM OF ALL RESPONSES TO PHQ QUESTIONS 1-9: 6
10. IF YOU CHECKED OFF ANY PROBLEMS, HOW DIFFICULT HAVE THESE PROBLEMS MADE IT FOR YOU TO DO YOUR WORK, TAKE CARE OF THINGS AT HOME, OR GET ALONG WITH OTHER PEOPLE: SOMEWHAT DIFFICULT
SUM OF ALL RESPONSES TO PHQ QUESTIONS 1-9: 6

## 2022-04-18 ASSESSMENT — ACTIVITIES OF DAILY LIVING (ADL): CURRENT_FUNCTION: NO ASSISTANCE NEEDED

## 2022-04-18 NOTE — PROGRESS NOTES
"SUBJECTIVE:   Moose Serrano is a 71 year old male who presents for Preventive Visit.        Are you in the first 12 months of your Medicare coverage?  No    Healthy Habits:     In general, how would you rate your overall health?  Fair    Frequency of exercise:  1 day/week    Duration of exercise:  15-30 minutes    Do you usually eat at least 4 servings of fruit and vegetables a day, include whole grains    & fiber and avoid regularly eating high fat or \"junk\" foods?  No    Taking medications regularly:  Yes    Medication side effects:  Other    Ability to successfully perform activities of daily living:  No assistance needed    Home Safety:  No safety concerns identified    Hearing Impairment:  Need to ask people to speak up or repeat themselves    In the past 6 months, have you been bothered by leaking of urine?  No    In general, how would you rate your overall mental or emotional health?  Fair      PHQ-2 Total Score: 2    Additional concerns today:  No    Do you feel safe in your environment? Yes    Have you ever done Advance Care Planning? (For example, a Health Directive, POLST, or a discussion with a medical provider or your loved ones about your wishes): No, advance care planning information given to patient to review.  Patient declined advance care planning discussion at this time.       Fall risk  Fallen 2 or more times in the past year?: No  Any fall with injury in the past year?: No    Cognitive Screening   1) Repeat 3 items (Leader, Season, Table)    2) Clock draw: NORMAL  3) 3 item recall: Recalls 3 objects  Results: 3 items recalled: COGNITIVE IMPAIRMENT LESS LIKELY    Mini-CogTM Copyright WON Machado. Licensed by the author for use in [facility]; reprinted with permission ([email or web address]). All rights reserved.      Do you have sleep apnea, excessive snoring or daytime drowsiness?: no    Reviewed and updated as needed this visit by clinical staff   Tobacco  Allergies  Meds            "     Reviewed and updated as needed this visit by Provider   Tobacco  Allergies                Social History     Tobacco Use     Smoking status: Former Smoker     Packs/day: 0.50     Years: 34.00     Pack years: 17.00     Types: Cigarettes     Quit date: 2000     Years since quittin.0     Smokeless tobacco: Current User     Types: Chew     Tobacco comment: 1 can every 3-4 weeks   Substance Use Topics     Alcohol use: Yes     Comment: 2 beers per day         Alcohol Use 2022   Prescreen: >3 drinks/day or >7 drinks/week? No   Prescreen: >3 drinks/day or >7 drinks/week? -           Hypertension Follow-up      Do you check your blood pressure regularly outside of the clinic? Yes     Are you following a low salt diet? Yes    Are your blood pressures ever more than 140 on the top number (systolic) OR more   than 90 on the bottom number (diastolic), for example 140/90? No    Hypothyroidism Follow-up      Since last visit, patient describes the following symptoms: Weight stable, no hair loss, no skin changes, no constipation, no loose stools    Patient reports of chronic cough.  He quit smoking in .  He continues to chew tobacco.  He would like a chest x-ray done to reassure her there is no lung abnormality.      Current providers sharing in care for this patient include:   Patient Care Team:  Demetrio Lawson MD as PCP - General (Family Practice)  Demetrio Lawson MD as Assigned PCP  Venus Travis RN as Specialty Care Coordinator (Hematology & Oncology)    The following health maintenance items are reviewed in Epic and correct as of today:  Health Maintenance Due   Topic Date Due     ZOSTER IMMUNIZATION (1 of 2) Never done     BP Readings from Last 3 Encounters:   22 (!) 152/103   22 (!) 144/89   21 134/89    Wt Readings from Last 3 Encounters:   22 96.2 kg (212 lb)   22 97.7 kg (215 lb 4.8 oz)   21 93.6 kg (206 lb 4.8 oz)                  Patient Active Problem List    Diagnosis     CARDIOVASCULAR SCREENING; LDL GOAL LESS THAN 160     Splenic marginal zone b-cell lymphoma (H)     Hypothyroidism due to acquired atrophy of thyroid     HTN, goal below 140/90     Past Surgical History:   Procedure Laterality Date     COLONOSCOPY  2013    Procedure: COLONOSCOPY;  Colonoscopy;  Surgeon: Dieudonne Harden MD;  Location: [facility]     FINGER SURGERY       right knee surgery  Approx      VASECTOMY       WRIST SURGERY         Social History     Tobacco Use     Smoking status: Former Smoker     Packs/day: 0.50     Years: 34.00     Pack years: 17.00     Types: Cigarettes     Quit date: 2000     Years since quittin.0     Smokeless tobacco: Current User     Types: Chew     Tobacco comment: 1 can every 3-4 weeks   Substance Use Topics     Alcohol use: Yes     Comment: 2 beers per day     Family History   Problem Relation Age of Onset     Unknown/Adopted No family hx of      Asthma No family hx of      C.A.D. No family hx of      Diabetes No family hx of      Hypertension No family hx of      Cerebrovascular Disease No family hx of      Breast Cancer No family hx of      Cancer - colorectal No family hx of      Prostate Cancer No family hx of      Alcohol/Drug No family hx of      Allergies No family hx of      Alzheimer Disease No family hx of      Anesthesia Reaction No family hx of      Arthritis No family hx of      Blood Disease No family hx of      Cancer No family hx of      Cardiovascular No family hx of      Circulatory No family hx of      Congenital Anomalies No family hx of      Connective Tissue Disorder No family hx of      Depression No family hx of      Endocrine Disease No family hx of      Eye Disorder No family hx of      Genetic Disorder No family hx of      Gastrointestinal Disease No family hx of      Genitourinary Problems No family hx of      Gynecology No family hx of      Heart Disease No family hx of      Lipids No family hx of      Musculoskeletal  "Disorder No family hx of      Neurologic Disorder No family hx of      Obesity No family hx of      Osteoporosis No family hx of      Psychotic Disorder No family hx of      Respiratory No family hx of      Thyroid Disease No family hx of      Family History Negative No family hx of      Hearing Loss No family hx of          Current Outpatient Medications   Medication Sig Dispense Refill     amLODIPine (NORVASC) 10 MG tablet Take 1 tablet (10 mg) by mouth daily 90 tablet 0     levothyroxine (SYNTHROID/LEVOTHROID) 112 MCG tablet Take 1 tablet (112 mcg) by mouth daily 90 tablet 3     multivitamin w/minerals (THERA-VIT-M) tablet Take 1 tablet by mouth daily       Allergies   Allergen Reactions     Penicillins Hives     Rituximab Nausea     Rigors             Review of Systems  CONSTITUTIONAL: NEGATIVE for fever, chills, change in weight  INTEGUMENTARY/SKIN: NEGATIVE for worrisome rashes, moles or lesions  EYES: NEGATIVE for vision changes or irritation  ENT/MOUTH: NEGATIVE for ear, mouth and throat problems  RESP: NEGATIVE for significant cough or SOB  BREAST: NEGATIVE for masses, tenderness or discharge  CV: NEGATIVE for chest pain, palpitations or peripheral edema  GI: NEGATIVE for nausea, abdominal pain, heartburn, or change in bowel habits  : NEGATIVE for frequency, dysuria, or hematuria  MUSCULOSKELETAL: NEGATIVE for significant arthralgias or myalgia  NEURO: NEGATIVE for weakness, dizziness or paresthesias  ENDOCRINE: NEGATIVE for temperature intolerance, skin/hair changes  HEME: NEGATIVE for bleeding problems  PSYCHIATRIC: NEGATIVE for changes in mood or affect    OBJECTIVE:   BP (!) 152/103   Pulse 82   Temp 97.7  F (36.5  C) (Tympanic)   Resp 14   Ht 1.805 m (5' 11.06\")   Wt 96.2 kg (212 lb)   SpO2 94%   BMI 29.52 kg/m   Estimated body mass index is 29.52 kg/m  as calculated from the following:    Height as of this encounter: 1.805 m (5' 11.06\").    Weight as of this encounter: 96.2 kg (212 " lb).  Physical Exam  GENERAL: healthy, alert and no distress  EYES: Eyes grossly normal to inspection, PERRL and conjunctivae and sclerae normal  HENT: ear canals and TM's normal, nose and mouth without ulcers or lesions  NECK: no adenopathy, no asymmetry, masses, or scars and thyroid normal to palpation  RESP: lungs clear to auscultation - no rales, rhonchi or wheezes  CV: regular rate and rhythm, normal S1 S2, no S3 or S4, no murmur, click or rub, no peripheral edema and peripheral pulses strong  ABDOMEN: soft, nontender, no hepatosplenomegaly, no masses and bowel sounds normal  MS: no gross musculoskeletal defects noted, no edema  SKIN: no suspicious lesions or rashes  NEURO: Normal strength and tone, mentation intact and speech normal  PSYCH: mentation appears normal, affect normal/bright        ASSESSMENT / PLAN:   1. Encounter for Medicare annual wellness exam      2. Hypothyroidism due to acquired atrophy of thyroid  TSH   Date Value Ref Range Status   04/18/2022 3.76 0.40 - 4.00 mU/L Final   10/12/2020 2.46 0.40 - 4.00 mU/L Final   Well managed.  Resume current dose of levothyroxine.    - levothyroxine (SYNTHROID/LEVOTHROID) 112 MCG tablet; Take 1 tablet (112 mcg) by mouth daily  Dispense: 90 tablet; Refill: 3  - TSH; Future  - TSH    3. HTN, goal below 140/90  Not well controlled.  Increasing the dose of amlodipine from 7.5 to 10 mg daily.  Lab work ordered.  Await the results.  Follow-up in 6 weeks for recheck  - amLODIPine (NORVASC) 10 MG tablet; Take 1 tablet (10 mg) by mouth daily  Dispense: 90 tablet; Refill: 0  - Lipid panel reflex to direct LDL Fasting; Future  - Basic metabolic panel  (Ca, Cl, CO2, Creat, Gluc, K, Na, BUN); Future  - Lipid panel reflex to direct LDL Fasting  - Basic metabolic panel  (Ca, Cl, CO2, Creat, Gluc, K, Na, BUN)    4. High priority for 2019-nCoV vaccine    - COVID-19,PF,MODERNA (18+ Yrs BOOSTER .25mL)    5. Screening for prostate cancer    - Prostate Specific Antigen  "Screen; Future  - Prostate Specific Antigen Screen    6. Chronic cough    - XR Chest 2 Views; ordered and reviewed.  Patient has a stable right upper lobe calcified granuloma otherwise no acute findings noted on the chest x-ray.    Patient quit smoking about 12 years ago.  I would recommend to get a CT of the lung if the cough continues.        COUNSELING:  Reviewed preventive health counseling, as reflected in patient instructions       Regular exercise       Healthy diet/nutrition    Estimated body mass index is 29.52 kg/m  as calculated from the following:    Height as of this encounter: 1.805 m (5' 11.06\").    Weight as of this encounter: 96.2 kg (212 lb).    Weight management plan: Discussed healthy diet and exercise guidelines    He reports that he quit smoking about 22 years ago. His smoking use included cigarettes. He has a 17.00 pack-year smoking history. His smokeless tobacco use includes chew.      Appropriate preventive services were discussed with this patient, including applicable screening as appropriate for cardiovascular disease, diabetes, osteopenia/osteoporosis, and glaucoma.  As appropriate for age/gender, discussed screening for colorectal cancer, prostate cancer, breast cancer, and cervical cancer. Checklist reviewing preventive services available has been given to the patient.    Reviewed patients plan of care and provided an AVS. The Intermediate Care Plan ( asthma action plan, low back pain action plan, and migraine action plan) for Moose meets the Care Plan requirement. This Care Plan has been established and reviewed with the Patient.    Counseling Resources:  ATP IV Guidelines  Pooled Cohorts Equation Calculator  Breast Cancer Risk Calculator  Breast Cancer: Medication to Reduce Risk  FRAX Risk Assessment  ICSI Preventive Guidelines  Dietary Guidelines for Americans, 2010  [employer]'s MyPlate  ASA Prophylaxis  Lung CA Screening    Demetrio Lawson MD  [facility] " ROSALIND    Identified Health Risks:  Answers for HPI/ROS submitted by the patient on 4/18/2022  If you checked off any problems, how difficult have these problems made it for you to do your work, take care of things at home, or get along with other people?: Somewhat difficult  PHQ9 TOTAL SCORE: 6        The patient was provided with suggestions to help him develop a healthy physical lifestyle.  He is at risk for lack of exercise and has been provided with information to increase physical activity for the benefit of his well-being.  The patient was counseled and encouraged to consider modifying their diet and eating habits. He was provided with information on recommended healthy diet options.  The patient was provided with written information regarding signs of hearing loss.  The patient was provided with suggestions to help him develop a healthy emotional lifestyle.

## 2022-04-18 NOTE — LETTER
April 19, 2022      ZOILA Serrano  [address] [place] [address]        Dear ,    I have reviewed your recent labs. Here are the results:    -PSA (prostate specific antigen) test is normal.  This indicates a low likelihood of prostate cancer.  ADVISE: rechecking this in 1 year.    -LDL(bad) cholesterol level is elevated which can increase your heart disease risk.  A diet high in fat and simple carbohydrates, genetics and being overweight can contribute to this.     (AtheroSclerotic CardioVascular Disease )  The 10-year ASCVD risk score (Fozia GAITAN Jr., et al., 2013) is: 26.4%    Values used to calculate the score:      Age: 71 years      Sex: Male      Is Non- : No      Diabetic: No      Tobacco smoker: No      Systolic Blood Pressure: 152 mmHg      Is BP treated: Yes      HDL Cholesterol: 58 mg/dL      Total Cholesterol: 182 mg/dL    10-year ASCVD risk score is a useful tool that helps identify patients  at risk for heart attack and stroke. Patients are considered to be at elevated risk if the 10-year risk score is ?7.5%.   Unfortunately your score is high and therefore I would recommend that you should start a cholesterol lowering medication called Lipitor.    Please notify me back, if you are in agreement with starting the medication, so I can order it for you.     -Kidney function is normal (Cr, GFR), Sodium is normal, Potassium is normal, Calcium is normal, Glucose is normal.     -TSH (thyroid stimulating hormone) level is normal which indicates appropriate thyroid replacement dosing.  ADVISE: continuing same replacement dose and rechecking this in 12 months.      Resulted Orders   Prostate Specific Antigen Screen   Result Value Ref Range    Prostate Specific Antigen Screen 2.02 0.00 - 4.00 ug/L   Lipid panel reflex to direct LDL Fasting   Result Value Ref Range    Cholesterol 182 <200 mg/dL    Triglycerides 92 <150 mg/dL    Direct Measure HDL 58 >=40 mg/dL    LDL Cholesterol  Calculated 106 (H) <=100 mg/dL    Non HDL Cholesterol 124 <130 mg/dL    Patient Fasting > 8hrs? Yes     Narrative    Cholesterol  Desirable:  <200 mg/dL    Triglycerides  Normal:  Less than 150 mg/dL  Borderline High:  150-199 mg/dL  High:  200-499 mg/dL  Very High:  Greater than or equal to 500 mg/dL    Direct Measure HDL  Female:  Greater than or equal to 50 mg/dL   Male:  Greater than or equal to 40 mg/dL    LDL Cholesterol  Desirable:  <100mg/dL  Above Desirable:  100-129 mg/dL   Borderline High:  130-159 mg/dL   High:  160-189 mg/dL   Very High:  >= 190 mg/dL    Non HDL Cholesterol  Desirable:  130 mg/dL  Above Desirable:  130-159 mg/dL  Borderline High:  160-189 mg/dL  High:  190-219 mg/dL  Very High:  Greater than or equal to 220 mg/dL   TSH   Result Value Ref Range    TSH 3.76 0.40 - 4.00 mU/L   Basic metabolic panel  (Ca, Cl, CO2, Creat, Gluc, K, Na, BUN)   Result Value Ref Range    Sodium 137 133 - 144 mmol/L    Potassium 4.0 3.4 - 5.3 mmol/L    Chloride 105 94 - 109 mmol/L    Carbon Dioxide (CO2) 27 20 - 32 mmol/L    Anion Gap 5 3 - 14 mmol/L    Urea Nitrogen 11 7 - 30 mg/dL    Creatinine 0.98 0.66 - 1.25 mg/dL    Calcium 8.9 8.5 - 10.1 mg/dL    Glucose 90 70 - 99 mg/dL    GFR Estimate 82 >60 mL/min/1.73m2      Comment:      Effective December 21, 2021 eGFRcr in adults is calculated using the 2021 CKD-EPI creatinine equation which includes age and gender (Sherice et al., NEJM, DOI: 10.1056/AMRFmb8628378)       If you have any questions or concerns, please call the clinic at the number listed above.       Sincerely,      Demetrio Lawson MD

## 2022-04-18 NOTE — PATIENT INSTRUCTIONS
Patient Education   Personalized Prevention Plan  You are due for the preventive services outlined below.  Your care team is available to assist you in scheduling these services.  If you have already completed any of these items, please share that information with your care team to update in your medical record.  Health Maintenance Due   Topic Date Due     ANNUAL REVIEW OF HM ORDERS  Never done     Zoster (Shingles) Vaccine (1 of 2) Never done     LUNG CANCER SCREENING  11/14/2015     Flu Vaccine (1) 09/01/2021     FALL RISK ASSESSMENT  10/12/2021     COVID-19 Vaccine (4 - Booster for Moderna series) 02/08/2022     Your Health Risk Assessment indicates you feel you are not in good health    A healthy lifestyle helps keep the body fit and the mind alert. It helps protect you from disease, helps you fight disease, and helps prevent chronic disease (disease that doesn't go away) from getting worse. This is important as you get older and begin to notice twinges in muscles and joints and a decline in the strength and stamina you once took for granted. A healthy lifestyle includes good healthcare, good nutrition, weight control, recreation, and regular exercise. Avoid harmful substances and do what you can to keep safe. Another part of a healthy lifestyle is stay mentally active and socially involved.    Good healthcare     Have a wellness visit every year.     If you have new symptoms, let us know right away. Don't wait until the next checkup.     Take medicines exactly as prescribed and keep your medicines in a safe place. Tell us if your medicine causes problems.   Healthy diet and weight control     Eat 3 or 4 small, nutritious, low-fat, high-fiber meals a day. Include a variety of fruits, vegetables, and whole-grain foods.     Make sure you get enough calcium in your diet. Calcium, vitamin D, and exercise help prevent osteoporosis (bone thinning).     If you live alone, try eating with others when you can. That way  you get a good meal and have company while you eat it.     Try to keep a healthy weight. If you eat more calories than your body uses for energy, it will be stored as fat and you will gain weight.     Recreation   Recreation is not limited to sports and team events. It includes any activity that provides relaxation, interest, enjoyment, and exercise. Recreation provides an outlet for physical, mental, and social energy. It can give a sense of worth and achievement. It can help you stay healthy.    Mental Exercise and Social Involvement  Mental and emotional health is as important as physical health. Keep in touch with friends and family. Stay as active as possible. Continue to learn and challenge yourself.   Things you can do to stay mentally active are:    Learn something new, like a foreign language or musical instrument.     Play SCRABBLE or do crossword puzzles. If you cannot find people to play these games with you at home, you can play them with others on your computer through the Internet.     Join a games club--anything from card games to chess or checkers or lawn bowling.     Start a new hobby.     Go back to school.     Volunteer.     Read.   Keep up with world events.    Exercise for a Healthier Heart  You may wonder how you can improve the health of your heart. If you re thinking about exercise, you re on the right track. You don t need to become an athlete. But you do need a certain amount of brisk exercise to help strengthen your heart. If you have been diagnosed with a heart condition, your healthcare provider may advise exercise to help stabilize your condition. To help make exercise a habit, choose safe, fun activities.      Exercise with a friend. When activity is fun, you're more likely to stick with it.   Before you start  Check with your healthcare provider before starting an exercise program. This is especially important if you have not been active for a while. It's also important if you have a  long-term (chronic) health problem such as heart disease, diabetes, or obesity. Or if you are at high risk for having these problems.   Why exercise?  Exercising regularly offers many healthy rewards. It can help you do all of the following:     Improve your blood cholesterol level to help prevent further heart trouble    Lower your blood pressure to help prevent a stroke or heart attack    Control diabetes, or reduce your risk of getting this disease    Improve your heart and lung function    Reach and stay at a healthy weight    Make your muscles stronger so you can stay active    Prevent falls and fractures by slowing the loss of bone mass (osteoporosis)    Manage stress better    Reduce your blood pressure    Improve your sense of self and your body image  Exercise tips      Ease into your routine. Set small goals. Then build on them. If you are not sure what your activity level should be, talk with your healthcare provider first before starting an exercise routine.    Exercise on most days. Aim for a total of 150 minutes (2 hours and 30 minutes) or more of moderate-intensity aerobic activity each week. Or 75 minutes (1 hour and 15 minutes) or more of vigorous-intensity aerobic activity each week. Or try for a combination of both. Moderate activity means that you breathe heavier and your heart rate increases but you can still talk. Think about doing 40 minutes of moderate exercise, 3 to 4 times a week. For best results, activity should last for about 40 minutes to lower blood pressure and cholesterol. It's OK to work up to the 40-minute period over time. Examples of moderate-intensity activity are walking 1 mile in 15 minutes. Or doing 30 to 45 minutes of yard work.    Step up your daily activity level.  Along with your exercise program, try being more active the whole day. Walk instead of drive. Or park further away so that you take more steps each day. Do more household tasks or yard work. You may not be able  to meet the advised mount of physical activity. But doing some moderate- or vigorous-intensity aerobic activity can help reduce your risk for heart disease. Your healthcare provider can help you figure out what is best for you.    Choose 1 or more activities you enjoy.  Walking is one of the easiest things you can do. You can also try swimming, riding a bike, dancing, or taking an exercise class.    When to call your healthcare provider  Call your healthcare provider if you have any of these:     Chest pain or feel dizzy or lightheaded    Burning, tightness, pressure, or heaviness in your chest, neck, shoulders, back, or arms    Abnormal shortness of breath    More joint or muscle pain    A very fast or irregular heartbeat (palpitations)  [employer] last reviewed this educational content on 7/1/2019 2000-2021 [employer]. All rights reserved. This information is not intended as a substitute for professional medical care. Always follow your healthcare professional's instructions.          Understanding USDA MyPlate  The USDA has guidelines to help you make healthy food choices. These are called MyPlate. MyPlate shows the food groups that make up healthy meals using the image of a place setting. Before you eat, think about the healthiest choices for what to put on your plate or in your cup or bowl. To learn more about building a healthy plate, visit www.choosemyplate.gov.    The food groups    Fruits. Any fruit or 100% fruit juice counts as part of the Fruit Group. Fruits may be fresh, canned, frozen, or dried, and may be whole, cut-up, or pureed. Make 1/2 of your plate fruits and vegetables.    Vegetables. Any vegetable or 100% vegetable juice counts as a member of the Vegetable Group. Vegetables may be fresh, frozen, canned, or dried. They can be served raw or cooked and may be whole, cut-up, or mashed. Make 1/2 of your plate fruits and vegetables.    Grains. All foods made from grains are part of the  Grains Group. These include wheat, rice, oats, cornmeal, and barley. Grains are often used to make foods such as bread, pasta, oatmeal, cereal, tortillas, and grits. Grains should be no more than 1/4 of your plate. At least half of your grains should be whole grains.    Protein. This group includes meat, poultry, seafood, beans and peas, eggs, processed soy products (such as tofu), nuts (including nut butters), and seeds. Make protein choices no more than 1/4 of your plate. Meat and poultry choices should be lean or low fat.    Dairy. The Dairy Group includes all fluid milk products and foods made from milk that contain calcium, such as yogurt and cheese. (Foods that have little calcium, such as cream, butter, and cream cheese, are not part of this group.) Most dairy choices should be low-fat or fat-free.    Oils. Oils aren't a food group, but they do contain essential nutrients. However it's important to watch your intake of oils. These are fats that are liquid at room temperature. They include canola, corn, olive, soybean, vegetable, and sunflower oil. Foods that are mainly oil include mayonnaise, certain salad dressings, and soft margarines. You likely already get your daily oil allowance from the foods you eat.  Things to limit  Eating healthy also means limiting these things in your diet:       Salt (sodium). Many processed foods have a lot of sodium. To keep sodium intake down, eat fresh vegetables, meats, poultry, and seafood when possible. Purchase low-sodium, reduced-sodium, or no-salt-added food products at the store. And don't add salt to your meals at home. Instead, season them with herbs and spices such as dill, oregano, cumin, and paprika. Or try adding flavor with lemon or lime zest and juice.    Saturated fat. Saturated fats are most often found in animal products such as beef, pork, and chicken. They are often solid at room temperature, such as butter. To reduce your saturated fat intake, choose  leaner cuts of meat and poultry. And try healthier cooking methods such as grilling, broiling, roasting, or baking. For a simple lower-fat swap, use plain nonfat yogurt instead of mayonnaise when making potato salad or macaroni salad.    Added sugars. These are sugars added to foods. They are in foods such as ice cream, candy, soda, fruit drinks, sports drinks, energy drinks, cookies, pastries, jams, and syrups. Cut down on added sugars by sharing sweet treats with a family member or friend. You can also choose fruit for dessert, and drink water or other unsweetened beverages.     [employer] last reviewed this educational content on 6/1/2020 2000-2021 [employer]. All rights reserved. This information is not intended as a substitute for professional medical care. Always follow your healthcare professional's instructions.          Signs of Hearing Loss      Hearing much better with one ear can be a sign of hearing loss.   Hearing loss is a problem shared by many people. In fact, it is one of the most common health problems, particularly as people age. Most people age 65 and older have some hearing loss. By age 80, almost everyone does. Hearing loss often occurs slowly over the years. So you may not realize your hearing has gotten worse.  Have your hearing checked  Call your healthcare provider if you:    Have to strain to hear normal conversation    Have to watch other people s faces very carefully to follow what they re saying    Need to ask people to repeat what they ve said    Often misunderstand what people are saying    Turn the volume of the television or radio up so high that others complain    Feel that people are mumbling when they re talking to you    Find that the effort to hear leaves you feeling tired and irritated    Notice, when using the phone, that you hear better with one ear than the other  [employer] last reviewed this educational content on 1/1/2020 2000-2021 [employer]. All rights reserved. This information is not intended as a substitute for professional medical care. Always follow your healthcare professional's instructions.        Your Health Risk Assessment indicates you feel you are not in good emotional health.    Recreation   Recreation is not limited to sports and team events. It includes any activity that provides relaxation, interest, enjoyment, and exercise. Recreation provides an outlet for physical, mental, and social energy. It can give a sense of worth and achievement. It can help you stay healthy.    Mental Exercise and Social Involvement  Mental and emotional health is as important as physical health. Keep in touch with friends and family. Stay as active as possible. Continue to learn and challenge yourself.   Things you can do to stay mentally active are:    Learn something new, like a foreign language or musical instrument.     Play SCRABBLE or do crossword puzzles. If you cannot find people to play these games with you at home, you can play them with others on your computer through the Internet.     Join a games club--anything from card games to chess or checkers or lawn bowling.     Start a new hobby.     Go back to school.     Volunteer.     Read.   Keep up with world events.

## 2022-04-19 LAB
ANION GAP SERPL CALCULATED.3IONS-SCNC: 5 MMOL/L (ref 3–14)
BUN SERPL-MCNC: 11 MG/DL (ref 7–30)
CALCIUM SERPL-MCNC: 8.9 MG/DL (ref 8.5–10.1)
CHLORIDE BLD-SCNC: 105 MMOL/L (ref 94–109)
CHOLEST SERPL-MCNC: 182 MG/DL
CO2 SERPL-SCNC: 27 MMOL/L (ref 20–32)
CREAT SERPL-MCNC: 0.98 MG/DL (ref 0.66–1.25)
FASTING STATUS PATIENT QL REPORTED: YES
GFR SERPL CREATININE-BSD FRML MDRD: 82 ML/MIN/1.73M2
GLUCOSE BLD-MCNC: 90 MG/DL (ref 70–99)
HDLC SERPL-MCNC: 58 MG/DL
LDLC SERPL CALC-MCNC: 106 MG/DL
NONHDLC SERPL-MCNC: 124 MG/DL
POTASSIUM BLD-SCNC: 4 MMOL/L (ref 3.4–5.3)
PSA SERPL-MCNC: 2.02 UG/L (ref 0–4)
SODIUM SERPL-SCNC: 137 MMOL/L (ref 133–144)
TRIGL SERPL-MCNC: 92 MG/DL
TSH SERPL DL<=0.005 MIU/L-ACNC: 3.76 MU/L (ref 0.4–4)

## 2022-04-19 ASSESSMENT — PATIENT HEALTH QUESTIONNAIRE - PHQ9: SUM OF ALL RESPONSES TO PHQ QUESTIONS 1-9: 6

## 2022-04-20 ENCOUNTER — TELEPHONE (OUTPATIENT)
Dept: FAMILY MEDICINE | Facility: CLINIC | Age: 72
End: 2022-04-20
Payer: COMMERCIAL

## 2022-04-20 NOTE — TELEPHONE ENCOUNTER
Patient Contact    Attempt # 1    Was call answered?  No.  Left message on voicemail with information to call triage back at [phone], option 2.     On call back: result note below.    Verónica MCKEON RN  EP Triage

## 2022-04-20 NOTE — TELEPHONE ENCOUNTER
----- Message from Demetrio Lawson MD sent at 4/18/2022  5:59 PM CDT -----  Please call the patient to notify patient that his chest x-ray showed no signs of infection.  There is a old area of calcified granuloma which is likely due to a previous infection in that area.  That appears to be stable as compared to 2013.    Demetrio Lawson MD

## 2022-04-21 NOTE — TELEPHONE ENCOUNTER
Spoke to patient and wanted lab and chest x-ray results.     Results were given.     Andra Hoskins RN  -[facility]

## 2022-08-22 DIAGNOSIS — I10 HTN, GOAL BELOW 140/90: ICD-10-CM

## 2022-08-22 DIAGNOSIS — E03.4 HYPOTHYROIDISM DUE TO ACQUIRED ATROPHY OF THYROID: ICD-10-CM

## 2022-08-26 RX ORDER — LEVOTHYROXINE SODIUM 112 UG/1
112 TABLET ORAL DAILY
Qty: 90 TABLET | Refills: 0 | Status: SHIPPED | OUTPATIENT
Start: 2022-08-26 | End: 2023-03-27

## 2022-08-26 RX ORDER — AMLODIPINE BESYLATE 10 MG/1
TABLET ORAL
Qty: 90 TABLET | Refills: 0 | Status: SHIPPED | OUTPATIENT
Start: 2022-08-26 | End: 2023-01-04

## 2022-08-26 NOTE — TELEPHONE ENCOUNTER
Pt calling and states that he will be working in [place], [place] next week and will be leaving at noon on Sunday 8/28/22. Pt is requesting refills sent before the weekend. Routing to provider pool to ask if able to send refills d/t Dr. Renny AHN this week.    Pt requested to add-on levothyroxine to refill request.    June Block,  [facility]

## 2022-08-26 NOTE — TELEPHONE ENCOUNTER
Routing refill request to provider for review/approval because:  Patient's blood pressure not within protocol range.     BP Readings from Last 3 Encounters:   04/18/22 (!) 152/103   01/12/22 (!) 144/89   05/24/21 134/89     Vidya Armas RN

## 2022-09-26 NOTE — PROGRESS NOTES
"REASON FOR VISIT:  Management of splenic marginal zone lymphoma.       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  To summarize his course, he was noted to have progressive leukocytosis on routine blood work in mid 2013.  PET/CT revealed splenomegaly and lymphadenopathy in the axilla, pretracheal area, and portacaval area.  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  He was initially monitored without treatment.  After developing abdominal pain with massive splenomegaly, weight loss, and night sweats he was started on rituximab in 7/2014.  He had difficulty tolerating rituximab due to severe infusion reaction, so he was treated with CVP alone and then rituximab was successfully added with later cycles.  Otherwise, he tolerated treatment very well and completed 6 cycles at the end of 10/2014.  Video visit for management of lymphoma.    His wife, Trevro, was not with him today.  Since his last visit he has been feeling well.  Energy level and appetite are good.  No abdominal pain and normal bowel function.  No bleeding or unusual bruising.  No lumps or bumps.  He continues to be physically active and working steadily with [employer].  Heading to [place] soon.  Maybe retiring in the next year.     MEDICATIONS:  Current Outpatient Medications   Medication     amLODIPine (NORVASC) 10 MG tablet     levothyroxine (SYNTHROID/LEVOTHROID) 112 MCG tablet     multivitamin w/minerals (THERA-VIT-M) tablet     No current facility-administered medications for this visit.     PHYSICAL EXAMINATION:  BP (!) 139/99   Pulse 76   Temp 97.9  F (36.6  C)   Resp 16   Ht 1.805 m (5' 11.06\")   Wt 101.6 kg (223 lb 14.4 oz)   SpO2 97%   BMI 31.17 kg/m    Wt Readings from Last 5 Encounters:   09/28/22 101.6 kg (223 lb 14.4 oz)   04/18/22 " 96.2 kg (212 lb)   01/12/22 97.7 kg (215 lb 4.8 oz)   05/24/21 93.6 kg (206 lb 4.8 oz)   04/26/21 93 kg (205 lb)     General: Well appearing. No distress.  HEENT: Sclerae anicteric. No OP lesions, masses, or tonsilar enlargement.  Lungs: Clear bilaterally without wheezing or crackles.  Heart: Regular rate and rhythm.  Gastrointestinal: Bowel sounds present, no tenderness to palpation, spleen tip not palpable.  Extremities: No lower extremity edema.  Lymph:  No cervical, clavicular, axillary, epitrochlear, or inguinal lymphadenopathy.  Performance status: ECOG 0    LABORATORY DATA:  Reviewed by me  Recent Labs   Lab Test 09/28/22  1222 01/12/22  0852 05/24/21  0629 11/17/20  0710 04/29/20  0809   WBC 6.4 9.0 6.4 5.8 4.8   HGB 15.1 15.4 14.6 15.6 14.3    277 273 263 220   ANEU  --   --  4.4 3.6 2.9   ANEUTAUTO 4.4 6.5  --   --   --    ALYM  --   --  1.1 1.2 1.1   ALYMPAUTO 1.1 1.5  --   --   --      Recent Labs   Lab Test 09/28/22  1222 04/18/22  1336 01/12/22  0852 05/24/21  0629 11/17/20  0710 08/17/15  0710 05/18/15  0711 02/23/15  0810 11/17/14  1204 10/27/14  1037 07/21/14  0756 07/18/14  0906    137 141 138 135   < > 140   < > 141 139   < > 142   POTASSIUM 4.1 4.0 4.0 4.1 3.9   < > 4.9   < > 4.0 4.3   < > 4.5   CHLORIDE 104 105 107 107 105   < > 106   < > 107 107   < > 102   CO2 22 27 27 24 26   < > 27   < > 24 26   < > 29   BUN 10.1 11 10 12 12   < > 13   < > 8 13   < > 16   CR 0.95 0.98 1.00 0.84 1.03   < > 0.89   < > 1.05 0.94   < > 0.89   RACHEAL 8.9 8.9 9.0 8.1* 8.8   < > 8.6   < > 9.1 9.5   < > 9.0   PHOS  --   --   --   --   --   --   --   --   --   --   --  2.9   URIC  --   --   --   --   --   --  6.1  --  6.3 5.8   < > 5.0   LDH  --   --  165 158 170   < > 194   < > 183 218   < > 464    < > = values in this interval not displayed.     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      His lymphoma clinically remains in remission.  Blood counts look great.  There is nothing to suggest lymphoma recurrence or complications.  We will continue to monitor.       He has no active ID issues.  He is going to get a seasonal flu shot.  He is up to date pneumococcal vaccines.  I recommended the Shingrix series and he declined.  We discussed the importance of following local and federal guidance regarding measures to reduce the risk of COVID-19 given the increased risk of complications in the setting of an immunocompromised state.  He completed a COVID-19 vaccine including a bivalent COVID booster.     He will continue to work with Dr. Lawson for his health maintenance and other medical issues including hypertension.        I plan to see him again in about a year.  I reminded him to contact us in the interim if questions, concerns or new issues arise.     Carlos Beebe MD, PhD  Attending Physician, [facility]   [facility]  Division of Hematology, Oncology, and Transplantation  [phone] [facility]

## 2022-09-28 ENCOUNTER — ONCOLOGY VISIT (OUTPATIENT)
Dept: ONCOLOGY | Facility: CLINIC | Age: 72
End: 2022-09-28
Attending: INTERNAL MEDICINE
Payer: COMMERCIAL

## 2022-09-28 ENCOUNTER — APPOINTMENT (OUTPATIENT)
Dept: LAB | Facility: CLINIC | Age: 72
End: 2022-09-28
Attending: INTERNAL MEDICINE
Payer: COMMERCIAL

## 2022-09-28 VITALS
BODY MASS INDEX: 31.35 KG/M2 | RESPIRATION RATE: 16 BRPM | DIASTOLIC BLOOD PRESSURE: 99 MMHG | HEART RATE: 76 BPM | WEIGHT: 223.9 LBS | TEMPERATURE: 97.9 F | SYSTOLIC BLOOD PRESSURE: 139 MMHG | HEIGHT: 71 IN | OXYGEN SATURATION: 97 %

## 2022-09-28 DIAGNOSIS — C83.07 SPLENIC MARGINAL ZONE B-CELL LYMPHOMA (H): Primary | ICD-10-CM

## 2022-09-28 LAB
ALBUMIN SERPL BCG-MCNC: 4.2 G/DL (ref 3.5–5.2)
ALP SERPL-CCNC: 64 U/L (ref 40–129)
ALT SERPL W P-5'-P-CCNC: 12 U/L (ref 10–50)
ANION GAP SERPL CALCULATED.3IONS-SCNC: 11 MMOL/L (ref 7–15)
AST SERPL W P-5'-P-CCNC: 18 U/L (ref 10–50)
BASOPHILS # BLD AUTO: 0.1 10E3/UL (ref 0–0.2)
BASOPHILS NFR BLD AUTO: 1 %
BILIRUB SERPL-MCNC: 0.5 MG/DL
BUN SERPL-MCNC: 10.1 MG/DL (ref 8–23)
CALCIUM SERPL-MCNC: 8.9 MG/DL (ref 8.8–10.2)
CHLORIDE SERPL-SCNC: 104 MMOL/L (ref 98–107)
CREAT SERPL-MCNC: 0.95 MG/DL (ref 0.67–1.17)
DEPRECATED HCO3 PLAS-SCNC: 22 MMOL/L (ref 22–29)
EOSINOPHIL # BLD AUTO: 0.1 10E3/UL (ref 0–0.7)
EOSINOPHIL NFR BLD AUTO: 1 %
ERYTHROCYTE [DISTWIDTH] IN BLOOD BY AUTOMATED COUNT: 13.2 % (ref 10–15)
GFR SERPL CREATININE-BSD FRML MDRD: 86 ML/MIN/1.73M2
GLUCOSE SERPL-MCNC: 113 MG/DL (ref 70–99)
HCT VFR BLD AUTO: 44.4 % (ref 40–53)
HGB BLD-MCNC: 15.1 G/DL (ref 13.3–17.7)
IMM GRANULOCYTES # BLD: 0 10E3/UL
IMM GRANULOCYTES NFR BLD: 0 %
LDH SERPL L TO P-CCNC: 224 U/L (ref 0–250)
LYMPHOCYTES # BLD AUTO: 1.1 10E3/UL (ref 0.8–5.3)
LYMPHOCYTES NFR BLD AUTO: 17 %
MCH RBC QN AUTO: 33.5 PG (ref 26.5–33)
MCHC RBC AUTO-ENTMCNC: 34 G/DL (ref 31.5–36.5)
MCV RBC AUTO: 98 FL (ref 78–100)
MONOCYTES # BLD AUTO: 0.7 10E3/UL (ref 0–1.3)
MONOCYTES NFR BLD AUTO: 11 %
NEUTROPHILS # BLD AUTO: 4.4 10E3/UL (ref 1.6–8.3)
NEUTROPHILS NFR BLD AUTO: 70 %
NRBC # BLD AUTO: 0 10E3/UL
NRBC BLD AUTO-RTO: 0 /100
PLATELET # BLD AUTO: 227 10E3/UL (ref 150–450)
POTASSIUM SERPL-SCNC: 4.1 MMOL/L (ref 3.4–5.3)
PROT SERPL-MCNC: 7.3 G/DL (ref 6.4–8.3)
RBC # BLD AUTO: 4.51 10E6/UL (ref 4.4–5.9)
SODIUM SERPL-SCNC: 137 MMOL/L (ref 136–145)
WBC # BLD AUTO: 6.4 10E3/UL (ref 4–11)

## 2022-09-28 PROCEDURE — 80053 COMPREHEN METABOLIC PANEL: CPT | Performed by: INTERNAL MEDICINE

## 2022-09-28 PROCEDURE — 99214 OFFICE O/P EST MOD 30 MIN: CPT | Performed by: INTERNAL MEDICINE

## 2022-09-28 PROCEDURE — 83615 LACTATE (LD) (LDH) ENZYME: CPT | Performed by: INTERNAL MEDICINE

## 2022-09-28 PROCEDURE — G0463 HOSPITAL OUTPT CLINIC VISIT: HCPCS

## 2022-09-28 PROCEDURE — 36415 COLL VENOUS BLD VENIPUNCTURE: CPT | Performed by: INTERNAL MEDICINE

## 2022-09-28 PROCEDURE — 85025 COMPLETE CBC W/AUTO DIFF WBC: CPT | Performed by: INTERNAL MEDICINE

## 2022-09-28 ASSESSMENT — PAIN SCALES - GENERAL: PAINLEVEL: NO PAIN (0)

## 2022-09-28 NOTE — LETTER
9/28/2022         RE: Moose Serrano  [address]  [place] [address]        Dear Colleague,    Thank you for referring your patient, Moose Serrano, to the [facility]. Please see a copy of my visit note below.    REASON FOR VISIT:  Management of splenic marginal zone lymphoma.       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  To summarize his course, he was noted to have progressive leukocytosis on routine blood work in mid 2013.  PET/CT revealed splenomegaly and lymphadenopathy in the axilla, pretracheal area, and portacaval area.  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  He was initially monitored without treatment.  After developing abdominal pain with massive splenomegaly, weight loss, and night sweats he was started on rituximab in 7/2014.  He had difficulty tolerating rituximab due to severe infusion reaction, so he was treated with CVP alone and then rituximab was successfully added with later cycles.  Otherwise, he tolerated treatment very well and completed 6 cycles at the end of 10/2014.  Video visit for management of lymphoma.    His wife, Trevor, was not with him today.  Since his last visit he has been feeling well.  Energy level and appetite are good.  No abdominal pain and normal bowel function.  No bleeding or unusual bruising.  No lumps or bumps.  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  Heading to [place] soon.  Maybe retiring in the next year.     MEDICATIONS:  Current Outpatient Medications   Medication     amLODIPine (NORVASC) 10 MG tablet     levothyroxine (SYNTHROID/LEVOTHROID) 112 MCG tablet     multivitamin w/minerals (THERA-VIT-M) tablet     No current facility-administered medications for this visit.     PHYSICAL  "EXAMINATION:  BP (!) 139/99   Pulse 76   Temp 97.9  F (36.6  C)   Resp 16   Ht 1.805 m (5' 11.06\")   Wt 101.6 kg (223 lb 14.4 oz)   SpO2 97%   BMI 31.17 kg/m    Wt Readings from Last 5 Encounters:   09/28/22 101.6 kg (223 lb 14.4 oz)   04/18/22 96.2 kg (212 lb)   01/12/22 97.7 kg (215 lb 4.8 oz)   05/24/21 93.6 kg (206 lb 4.8 oz)   04/26/21 93 kg (205 lb)     General: Well appearing. No distress.  HEENT: Sclerae anicteric. No OP lesions, masses, or tonsilar enlargement.  Lungs: Clear bilaterally without wheezing or crackles.  Heart: Regular rate and rhythm.  Gastrointestinal: Bowel sounds present, no tenderness to palpation, spleen tip not palpable.  Extremities: No lower extremity edema.  Lymph:  No cervical, clavicular, axillary, epitrochlear, or inguinal lymphadenopathy.  Performance status: ECOG 0    LABORATORY DATA:  Reviewed by me  Recent Labs   Lab Test 09/28/22  1222 01/12/22  0852 05/24/21  0629 11/17/20  0710 04/29/20  0809   WBC 6.4 9.0 6.4 5.8 4.8   HGB 15.1 15.4 14.6 15.6 14.3    277 273 263 220   ANEU  --   --  4.4 3.6 2.9   ANEUTAUTO 4.4 6.5  --   --   --    ALYM  --   --  1.1 1.2 1.1   ALYMPAUTO 1.1 1.5  --   --   --      Recent Labs   Lab Test 09/28/22  1222 04/18/22  1336 01/12/22  0852 05/24/21  0629 11/17/20  0710 08/17/15  0710 05/18/15  0711 02/23/15  0810 11/17/14  1204 10/27/14  1037 07/21/14  0756 07/18/14  0906    137 141 138 135   < > 140   < > 141 139   < > 142   POTASSIUM 4.1 4.0 4.0 4.1 3.9   < > 4.9   < > 4.0 4.3   < > 4.5   CHLORIDE 104 105 107 107 105   < > 106   < > 107 107   < > 102   CO2 22 27 27 24 26   < > 27   < > 24 26   < > 29   BUN 10.1 11 10 12 12   < > 13   < > 8 13   < > 16   CR 0.95 0.98 1.00 0.84 1.03   < > 0.89   < > 1.05 0.94   < > 0.89   RACHEAL 8.9 8.9 9.0 8.1* 8.8   < > 8.6   < > 9.1 9.5   < > 9.0   PHOS  --   --   --   --   --   --   --   --   --   --   --  2.9   URIC  --   --   --   --   --   --  6.1  --  6.3 5.8   < > 5.0   LDH  --   --  165 158 " 170   < > 194   < > 183 218   < > 464    < > = values in this interval not displayed.     Recent Labs   Lab Test 09/28/22  1222 01/12/22  0852 05/24/21  0629   AST 18 8 10   ALT 12 24 17   ALKPHOS 64 73 75   BILITOTAL 0.5 0.6 0.7     IMPRESSION AND PLAN:  Mr. 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      His lymphoma clinically remains in remission.  Blood counts look great.  There is nothing to suggest lymphoma recurrence or complications.  We will continue to monitor.       He has no active ID issues.  He is going to get a seasonal flu shot.  He is up to date pneumococcal vaccines.  I recommended the Shingrix series and he declined.  We discussed the importance of following local and federal guidance regarding measures to reduce the risk of COVID-19 given the increased risk of complications in the setting of an immunocompromised state.  He completed a COVID-19 vaccine including a bivalent COVID booster.     He will continue to work with Dr. Lawson for his health maintenance and other medical issues including hypertension.        I plan to see him again in about a year.  I reminded him to contact us in the interim if questions, concerns or new issues arise.         Again, thank you for allowing me to participate in the care of your patient.      Sincerely,    Carlos Beebe MD

## 2022-09-28 NOTE — NURSING NOTE
"Oncology Rooming Note    September 28, 2022 12:48 PM   Moose Serrano is a 71 year old male who presents for:    Chief Complaint   Patient presents with     Blood Draw     Labs drawn by RN via , vitals taken.     Oncology Clinic Visit     [facility] RETURN - LYMPHOMA     Initial Vitals: BP (!) 139/99   Pulse 76   Temp 97.9  F (36.6  C)   Resp 16   Ht 1.805 m (5' 11.06\")   Wt 101.6 kg (223 lb 14.4 oz)   SpO2 97%   BMI 31.17 kg/m   Estimated body mass index is 31.17 kg/m  as calculated from the following:    Height as of this encounter: 1.805 m (5' 11.06\").    Weight as of this encounter: 101.6 kg (223 lb 14.4 oz). Body surface area is 2.26 meters squared.  No Pain (0) Comment: Data Unavailable   No LMP for male patient.  Allergies reviewed: Yes  Medications reviewed: Yes    Medications: Medication refills not needed today.  Pharmacy name entered into EPIC:    [facility] [place] [address] [facility] - [place], [place] - [address] [id number] - [place], [place] - [address]  WRITTEN PRESCRIPTION REQUESTED    Clinical concerns: No new concerns. Enockbhumi was notified.      Kiko Norman LPN            "

## 2022-09-28 NOTE — NURSING NOTE
Chief Complaint   Patient presents with     Blood Draw     Labs drawn by RN via , vitals taken.     Labs collected from venipuncture by RN. Vitals taken. Checked in for appointment(s).    Cee Reese RN

## 2023-03-27 DIAGNOSIS — I10 HTN, GOAL BELOW 140/90: ICD-10-CM

## 2023-03-27 DIAGNOSIS — E03.4 HYPOTHYROIDISM DUE TO ACQUIRED ATROPHY OF THYROID: ICD-10-CM

## 2023-03-27 RX ORDER — AMLODIPINE BESYLATE 10 MG/1
10 TABLET ORAL DAILY
Qty: 90 TABLET | Refills: 0 | Status: SHIPPED | OUTPATIENT
Start: 2023-03-27

## 2023-03-27 RX ORDER — LEVOTHYROXINE SODIUM 112 UG/1
112 TABLET ORAL DAILY
Qty: 90 TABLET | Refills: 0 | Status: SHIPPED | OUTPATIENT
Start: 2023-03-27

## 2023-09-25 NOTE — PROGRESS NOTES
REASON FOR VISIT:  Management of splenic marginal zone lymphoma.       HISTORY OF PRESENT ILLNESS:  Corbin Serrano is a 72 year old with splenic marginal zone lymphoma.  To summarize his course, he was noted to have progressive leukocytosis on routine blood work in mid 2013.  PET/CT revealed splenomegaly and lymphadenopathy in the axilla, pretracheal area, and portacaval area.  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  He was initially monitored without treatment.  After developing abdominal pain with massive splenomegaly, weight loss, and night sweats he was started on rituximab in 7/2014.  He had difficulty tolerating rituximab due to severe infusion reaction, so he was treated with CVP alone and then rituximab was successfully added with later cycles.  Otherwise, he tolerated treatment very well and completed 6 cycles at the end of 10/2014.  Video visit for management of lymphoma.    His wife, Trevor, was not with him today.  Since his last visit he has been feeling well.  Energy level and appetite are good.  No abdominal pain and normal bowel function.  No bleeding or unusual bruising.  No lumps or bumps.  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  Still working.     MEDICATIONS:  Current Outpatient Medications   Medication    amLODIPine (NORVASC) 10 MG tablet    levothyroxine (SYNTHROID/LEVOTHROID) 112 MCG tablet    multivitamin w/minerals (THERA-VIT-M) tablet     No current facility-administered medications for this visit.     PHYSICAL EXAMINATION:  BP (!) 148/95 (BP Location: Right arm, Patient Position: Sitting, Cuff Size: Adult Regular)   Pulse 62   Temp 98.3  F (36.8  C) (Oral)   Resp 18   Wt 90.4 kg (199 lb 4.8 oz)   SpO2 97%   BMI 27.75 kg/m    Wt Readings from Last 5  Encounters:   09/27/23 90.4 kg (199 lb 4.8 oz)   09/28/22 101.6 kg (223 lb 14.4 oz)   04/18/22 96.2 kg (212 lb)   01/12/22 97.7 kg (215 lb 4.8 oz)   05/24/21 93.6 kg (206 lb 4.8 oz)     General: Well appearing. No distress.  HEENT: Sclerae anicteric. No OP lesions, masses, or tonsilar enlargement.  Lungs: Clear bilaterally without wheezing or crackles.  Heart: Regular rate and rhythm.  Gastrointestinal: Bowel sounds present, no tenderness to palpation, spleen tip not palpable.  Extremities: No lower extremity edema.  Lymph:  No cervical, clavicular, axillary, epitrochlear, or inguinal lymphadenopathy.  Performance status: ECOG 0    LABORATORY DATA:  Reviewed by me  Recent Labs   Lab Test 09/27/23  0736 09/28/22  1222 01/12/22  0852 05/24/21  0629 11/17/20  0710 04/29/20  0809   WBC 5.2 6.4 9.0 6.4 5.8 4.8   HGB 14.0 15.1 15.4 14.6 15.6 14.3    227 277 273 263 220   ANEU  --   --   --  4.4 3.6 2.9   ANEUTAUTO 3.2 4.4 6.5  --   --   --    ALYM  --   --   --  1.1 1.2 1.1   ALYMPAUTO 1.1 1.1 1.5  --   --   --      Recent Labs   Lab Test 09/27/23  0736 09/28/22  1222 04/18/22  1336 01/12/22  0852    137 137 141   POTASSIUM 4.2 4.1 4.0 4.0   CHLORIDE 105 104 105 107   CO2 26 22 27 27   BUN 9.9 10.1 11 10   CR 1.01 0.95 0.98 1.00   RACHEAL 9.0 8.9 8.9 9.0    224  --  165     Recent Labs   Lab Test 09/27/23  0736 09/28/22  1222 01/12/22  0852   AST 15 18 8   ALT 14 12 24   ALKPHOS 53 64 73   BILITOTAL 0.6 0.5 0.6     IMPRESSION AND PLAN:  Corbin Serrano is a 72 year old with splenic marginal zone lymphoma.      His lymphoma clinically remains in remission.  Blood counts look great.  There is nothing to suggest lymphoma recurrence or complications.  We will continue to monitor.       He has no active ID issues.  He is up to date pneumococcal vaccines.  I recommended the Shingrix series and he declined.  We reviewed the current COVID-19 vaccine guidelines.  I recommended the updated COVID-19 vaccine.  He got a flu  shot today.     He will continue to work with [facility] for his health maintenance and other medical issues including hypertension.        I plan to see him again in about a year.  I reminded him to contact us in the interim if questions, concerns or new issues arise.     Total of 30 minutes on patient visit, reviewing records, interpreting test results, placing orders, and documentation on the day of service.    Carlos Beebe MD, PhD  Attending Physician, [facility]  [phone] [facility]

## 2023-09-27 ENCOUNTER — ONCOLOGY VISIT (OUTPATIENT)
Dept: ONCOLOGY | Facility: CLINIC | Age: 73
End: 2023-09-27
Attending: INTERNAL MEDICINE
Payer: COMMERCIAL

## 2023-09-27 ENCOUNTER — APPOINTMENT (OUTPATIENT)
Dept: LAB | Facility: CLINIC | Age: 73
End: 2023-09-27
Attending: INTERNAL MEDICINE
Payer: COMMERCIAL

## 2023-09-27 ENCOUNTER — PATIENT OUTREACH (OUTPATIENT)
Dept: ONCOLOGY | Facility: CLINIC | Age: 73
End: 2023-09-27

## 2023-09-27 VITALS
WEIGHT: 199.3 LBS | BODY MASS INDEX: 27.9 KG/M2 | SYSTOLIC BLOOD PRESSURE: 148 MMHG | TEMPERATURE: 98.3 F | OXYGEN SATURATION: 97 % | HEIGHT: 71 IN | DIASTOLIC BLOOD PRESSURE: 95 MMHG | HEART RATE: 62 BPM | RESPIRATION RATE: 18 BRPM

## 2023-09-27 DIAGNOSIS — C83.07 SPLENIC MARGINAL ZONE B-CELL LYMPHOMA (H): Primary | ICD-10-CM

## 2023-09-27 DIAGNOSIS — Z23 NEED FOR PROPHYLACTIC VACCINATION AND INOCULATION AGAINST INFLUENZA: ICD-10-CM

## 2023-09-27 LAB
ALBUMIN SERPL BCG-MCNC: 4.2 G/DL (ref 3.5–5.2)
ALP SERPL-CCNC: 53 U/L (ref 40–129)
ALT SERPL W P-5'-P-CCNC: 14 U/L (ref 0–70)
ANION GAP SERPL CALCULATED.3IONS-SCNC: 10 MMOL/L (ref 7–15)
AST SERPL W P-5'-P-CCNC: 15 U/L (ref 0–45)
BASOPHILS # BLD AUTO: 0.1 10E3/UL (ref 0–0.2)
BASOPHILS NFR BLD AUTO: 1 %
BILIRUB SERPL-MCNC: 0.6 MG/DL
BUN SERPL-MCNC: 9.9 MG/DL (ref 8–23)
CALCIUM SERPL-MCNC: 9 MG/DL (ref 8.8–10.2)
CHLORIDE SERPL-SCNC: 105 MMOL/L (ref 98–107)
CREAT SERPL-MCNC: 1.01 MG/DL (ref 0.67–1.17)
DEPRECATED HCO3 PLAS-SCNC: 26 MMOL/L (ref 22–29)
EGFRCR SERPLBLD CKD-EPI 2021: 79 ML/MIN/1.73M2
EOSINOPHIL # BLD AUTO: 0.1 10E3/UL (ref 0–0.7)
EOSINOPHIL NFR BLD AUTO: 3 %
ERYTHROCYTE [DISTWIDTH] IN BLOOD BY AUTOMATED COUNT: 12.9 % (ref 10–15)
GLUCOSE SERPL-MCNC: 105 MG/DL (ref 70–99)
HCT VFR BLD AUTO: 42.2 % (ref 40–53)
HGB BLD-MCNC: 14 G/DL (ref 13.3–17.7)
IMM GRANULOCYTES # BLD: 0 10E3/UL
IMM GRANULOCYTES NFR BLD: 0 %
LDH SERPL L TO P-CCNC: 184 U/L (ref 0–250)
LYMPHOCYTES # BLD AUTO: 1.1 10E3/UL (ref 0.8–5.3)
LYMPHOCYTES NFR BLD AUTO: 22 %
MCH RBC QN AUTO: 33.8 PG (ref 26.5–33)
MCHC RBC AUTO-ENTMCNC: 33.2 G/DL (ref 31.5–36.5)
MCV RBC AUTO: 102 FL (ref 78–100)
MONOCYTES # BLD AUTO: 0.6 10E3/UL (ref 0–1.3)
MONOCYTES NFR BLD AUTO: 12 %
NEUTROPHILS # BLD AUTO: 3.2 10E3/UL (ref 1.6–8.3)
NEUTROPHILS NFR BLD AUTO: 62 %
NRBC # BLD AUTO: 0 10E3/UL
NRBC BLD AUTO-RTO: 0 /100
PLATELET # BLD AUTO: 241 10E3/UL (ref 150–450)
POTASSIUM SERPL-SCNC: 4.2 MMOL/L (ref 3.4–5.3)
PROT SERPL-MCNC: 7 G/DL (ref 6.4–8.3)
RBC # BLD AUTO: 4.14 10E6/UL (ref 4.4–5.9)
SODIUM SERPL-SCNC: 141 MMOL/L (ref 135–145)
WBC # BLD AUTO: 5.2 10E3/UL (ref 4–11)

## 2023-09-27 PROCEDURE — 99213 OFFICE O/P EST LOW 20 MIN: CPT | Mod: 25 | Performed by: INTERNAL MEDICINE

## 2023-09-27 PROCEDURE — 83615 LACTATE (LD) (LDH) ENZYME: CPT | Performed by: INTERNAL MEDICINE

## 2023-09-27 PROCEDURE — 99214 OFFICE O/P EST MOD 30 MIN: CPT | Performed by: INTERNAL MEDICINE

## 2023-09-27 PROCEDURE — G0008 ADMIN INFLUENZA VIRUS VAC: HCPCS | Performed by: INTERNAL MEDICINE

## 2023-09-27 PROCEDURE — 250N000011 HC RX IP 250 OP 636: Performed by: INTERNAL MEDICINE

## 2023-09-27 PROCEDURE — 36415 COLL VENOUS BLD VENIPUNCTURE: CPT | Performed by: INTERNAL MEDICINE

## 2023-09-27 PROCEDURE — 85025 COMPLETE CBC W/AUTO DIFF WBC: CPT | Performed by: INTERNAL MEDICINE

## 2023-09-27 PROCEDURE — G0463 HOSPITAL OUTPT CLINIC VISIT: HCPCS | Mod: 25 | Performed by: INTERNAL MEDICINE

## 2023-09-27 PROCEDURE — 90662 IIV NO PRSV INCREASED AG IM: CPT | Performed by: INTERNAL MEDICINE

## 2023-09-27 PROCEDURE — 80053 COMPREHEN METABOLIC PANEL: CPT | Performed by: INTERNAL MEDICINE

## 2023-09-27 RX ADMIN — INFLUENZA A VIRUS A/VICTORIA/4897/2022 IVR-238 (H1N1) ANTIGEN (FORMALDEHYDE INACTIVATED), INFLUENZA A VIRUS A/DARWIN/9/2021 SAN-010 (H3N2) ANTIGEN (FORMALDEHYDE INACTIVATED), INFLUENZA B VIRUS B/PHUKET/3073/2013 ANTIGEN (FORMALDEHYDE INACTIVATED), AND INFLUENZA B VIRUS B/MICHIGAN/01/2021 ANTIGEN (FORMALDEHYDE INACTIVATED) 0.7 ML: 60; 60; 60; 60 INJECTION, SUSPENSION INTRAMUSCULAR at 08:51

## 2023-09-27 ASSESSMENT — PAIN SCALES - GENERAL: PAINLEVEL: NO PAIN (0)

## 2023-09-27 NOTE — PROGRESS NOTES
[facility]: Cancer Care Initial Note                                    Discussion with Patient:                                                      Pt here for annual appt today for continued surveillance.      Assessment:                                                      Initial  Current living arrangement:: I live in a private home with spouse  Informal Support system:: Spouse;Family;Friends  Bed or wheelchair confined:: No  Mobility Status: Independent  Transportation means:: Regular car  Medication adherence problem (GOAL):: No  Knowledgeable about how to use meds:: Yes  Medication side effects suspected:: No  Referrals Placed: None  Patient Reported Pain?: No  Pain Score: No Pain (0)    No assessment indicated    Intervention/Education provided during outreach:                                                       Patient to follow up as scheduled at next appt  Patient to call/[employer] message with updates  Confirmed patient has clinic and triage numbers    APRIL Cohen, RN  RN Care Coordinator  [facility]

## 2023-09-27 NOTE — NURSING NOTE
"Oncology Rooming Note    September 27, 2023 8:03 AM   Moose Serrano is a 72 year old male who presents for:    Chief Complaint   Patient presents with    Blood Draw     Labs drawn with  by RN. Vitals taken. Pt checked into next appointment.      Oncology Clinic Visit     [facility] RETURN - SPLENIC MARGINAL ZONE B CELL LYMPHOMA     Initial Vitals: BP (!) 148/95 (BP Location: Right arm, Patient Position: Sitting, Cuff Size: Adult Regular)   Pulse 62   Temp 98.3  F (36.8  C) (Oral)   Resp 18   Ht 1.805 m (5' 11.06\")   Wt 90.4 kg (199 lb 4.8 oz)   SpO2 97%   BMI 27.75 kg/m   Estimated body mass index is 27.75 kg/m  as calculated from the following:    Height as of this encounter: 1.805 m (5' 11.06\").    Weight as of this encounter: 90.4 kg (199 lb 4.8 oz). Body surface area is 2.13 meters squared.  No Pain (0) Comment: Data Unavailable   No LMP for male patient.  Allergies reviewed: Yes  Medications reviewed: Yes    Medications: Medication refills not needed today.  Pharmacy name entered into EPIC:    [facility] [place] [address] [facility] - [place], [place] - [address] [id number] - [place] - [address]  WRITTEN PRESCRIPTION REQUESTED    iKko Norman LPN              "

## 2023-09-27 NOTE — LETTER
9/27/2023         RE: Moose Serrano  [address]  [place] [address]        Dear Colleague,    Thank you for referring your patient, Moose Serrano, to the [facility]. Please see a copy of my visit note below.    REASON FOR VISIT:  Management of splenic marginal zone lymphoma.       HISTORY OF PRESENT ILLNESS:  Corbin Serrano is a 72 year old with splenic marginal zone lymphoma.  To summarize his course, he was noted to have progressive leukocytosis on routine blood work in mid 2013.  PET/CT revealed splenomegaly and lymphadenopathy in the axilla, pretracheal area, and portacaval area.  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  He was initially monitored without treatment.  After developing abdominal pain with massive splenomegaly, weight loss, and night sweats he was started on rituximab in 7/2014.  He had difficulty tolerating rituximab due to severe infusion reaction, so he was treated with CVP alone and then rituximab was successfully added with later cycles.  Otherwise, he tolerated treatment very well and completed 6 cycles at the end of 10/2014.  Video visit for management of lymphoma.    His wife, Trevor, was not with him today.  Since his last visit he has been feeling well.  Energy level and appetite are good.  No abdominal pain and normal bowel function.  No bleeding or unusual bruising.  No lumps or bumps.  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  Still working.     MEDICATIONS:  Current Outpatient Medications   Medication    amLODIPine (NORVASC) 10 MG tablet    levothyroxine (SYNTHROID/LEVOTHROID) 112 MCG tablet    multivitamin w/minerals (THERA-VIT-M) tablet     No current facility-administered medications for this visit.     PHYSICAL  EXAMINATION:  BP (!) 148/95 (BP Location: Right arm, Patient Position: Sitting, Cuff Size: Adult Regular)   Pulse 62   Temp 98.3  F (36.8  C) (Oral)   Resp 18   Wt 90.4 kg (199 lb 4.8 oz)   SpO2 97%   BMI 27.75 kg/m    Wt Readings from Last 5 Encounters:   09/27/23 90.4 kg (199 lb 4.8 oz)   09/28/22 101.6 kg (223 lb 14.4 oz)   04/18/22 96.2 kg (212 lb)   01/12/22 97.7 kg (215 lb 4.8 oz)   05/24/21 93.6 kg (206 lb 4.8 oz)     General: Well appearing. No distress.  HEENT: Sclerae anicteric. No OP lesions, masses, or tonsilar enlargement.  Lungs: Clear bilaterally without wheezing or crackles.  Heart: Regular rate and rhythm.  Gastrointestinal: Bowel sounds present, no tenderness to palpation, spleen tip not palpable.  Extremities: No lower extremity edema.  Lymph:  No cervical, clavicular, axillary, epitrochlear, or inguinal lymphadenopathy.  Performance status: ECOG 0    LABORATORY DATA:  Reviewed by me  Recent Labs   Lab Test 09/27/23  0736 09/28/22  1222 01/12/22  0852 05/24/21  0629 11/17/20  0710 04/29/20  0809   WBC 5.2 6.4 9.0 6.4 5.8 4.8   HGB 14.0 15.1 15.4 14.6 15.6 14.3    227 277 273 263 220   ANEU  --   --   --  4.4 3.6 2.9   ANEUTAUTO 3.2 4.4 6.5  --   --   --    ALYM  --   --   --  1.1 1.2 1.1   ALYMPAUTO 1.1 1.1 1.5  --   --   --      Recent Labs   Lab Test 09/27/23  0736 09/28/22  1222 04/18/22  1336 01/12/22  0852    137 137 141   POTASSIUM 4.2 4.1 4.0 4.0   CHLORIDE 105 104 105 107   CO2 26 22 27 27   BUN 9.9 10.1 11 10   CR 1.01 0.95 0.98 1.00   RACHEAL 9.0 8.9 8.9 9.0    224  --  165     Recent Labs   Lab Test 09/27/23  0736 09/28/22  1222 01/12/22  0852   AST 15 18 8   ALT 14 12 24   ALKPHOS 53 64 73   BILITOTAL 0.6 0.5 0.6     IMPRESSION AND PLAN:  Corbin Serrano is a 72 year old with splenic marginal zone lymphoma.      His lymphoma clinically remains in remission.  Blood counts look great.  There is nothing to suggest lymphoma recurrence or complications.  We will continue to  monitor.       He has no active ID issues.  He is up to date pneumococcal vaccines.  I recommended the Shingrix series and he declined.  We reviewed the current COVID-19 vaccine guidelines.  I recommended the updated COVID-19 vaccine.  He got a flu shot today.     He will continue to work with [facility] for his health maintenance and other medical issues including hypertension.        I plan to see him again in about a year.  I reminded him to contact us in the interim if questions, concerns or new issues arise.     Total of 30 minutes on patient visit, reviewing records, interpreting test results, placing orders, and documentation on the day of service.    Carlos Beebe MD, PhD  Attending Physician, [facility] [place]  [phone] [facility]

## 2023-09-27 NOTE — NURSING NOTE
Chief Complaint   Patient presents with    Blood Draw     Labs drawn with  by RN. Vitals taken. Pt checked into next appointment.       Kristy Palacios RN

## 2024-10-29 NOTE — PROGRESS NOTES
REASON FOR VISIT:  Management of splenic marginal zone lymphoma.       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  To summarize his course, he was noted to have progressive leukocytosis on routine blood work in mid 2013.  PET/CT revealed splenomegaly and lymphadenopathy in the axilla, pretracheal area, and portacaval area.  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  He was initially monitored without treatment.  After developing abdominal pain with massive splenomegaly, weight loss, and night sweats he was started on rituximab in 7/2014.  He had difficulty tolerating rituximab due to severe infusion reaction, so he was treated with CVP alone and then rituximab was successfully added with later cycles.  Otherwise, he tolerated treatment very well and completed 6 cycles at the end of 10/2014.      Corbin presents by himself today. He reports having COVID followed by a stomach bug about 1 month ago. He had a cough and severe diarrhea at that time, but has been feeling well since then. 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 Energy level is good; he is eating and drinking well, weight stable. Denies fever, chills, N/V/D, SOB, chest pain, abdominal pain, unusual bleeding or bruising, new lumps/bumps/lymphadenopathy. He does continue to have night sweats a few times per week, but this has not changed in the past few years. Pt notes he was a little dehydrated today because he was fasting for bloodwork. He is working on [employer] renovations this winter.      MEDICATIONS:  Current Outpatient Medications   Medication Sig Dispense Refill    amLODIPine (NORVASC) 10 MG tablet Take 1 tablet (10 mg) by mouth daily 90 tablet 0    levothyroxine (SYNTHROID/LEVOTHROID) 112 MCG tablet Take 1 tablet (112 mcg) by mouth daily 90  tablet 0    lisinopril (ZESTRIL) 10 MG tablet Take 1 tablet by mouth daily at 2 pm.      multivitamin w/minerals (THERA-VIT-M) tablet Take 1 tablet by mouth daily       No current facility-administered medications for this visit.     PHYSICAL EXAMINATION:  BP (!) 136/90 (BP Location: Left arm, Patient Position: Sitting, Cuff Size: Adult Large)   Pulse 86   Temp 98.4  F (36.9  C) (Oral)   Resp 18   Wt 98.8 kg (217 lb 14.4 oz)   SpO2 95%   BMI 30.34 kg/m    Wt Readings from Last 5 Encounters:   10/30/24 98.8 kg (217 lb 14.4 oz)   09/27/23 90.4 kg (199 lb 4.8 oz)   09/28/22 101.6 kg (223 lb 14.4 oz)   04/18/22 96.2 kg (212 lb)   01/12/22 97.7 kg (215 lb 4.8 oz)     General: Well appearing. No distress.  HEENT: Sclerae anicteric. No OP lesions, masses, or tonsilar enlargement.  Lungs: Clear bilaterally without wheezing or crackles.  Heart: Regular rate and rhythm.  Gastrointestinal: Bowel sounds present, no tenderness to palpation, spleen tip palpable  Extremities: No lower extremity edema.  Lymph:  No cervical, clavicular, axillary, epitrochlear, or inguinal lymphadenopathy.  Skin: No rash on exposed skin   Performance status: ECOG 0    LABORATORY DATA:  Reviewed by me  Recent Labs   Lab Test 10/30/24  0751 09/27/23  0736 09/28/22  1222 01/12/22  0852 05/24/21  0629 11/17/20  0710 04/29/20  0809   WBC 7.3 5.2 6.4   < > 6.4 5.8 4.8   HGB 14.7 14.0 15.1   < > 14.6 15.6 14.3    241 227   < > 273 263 220   ANEU  --   --   --   --  4.4 3.6 2.9   ANEUTAUTO 5.4 3.2 4.4   < >  --   --   --    ALYM  --   --   --   --  1.1 1.2 1.1   ALYMPAUTO 1.0 1.1 1.1   < >  --   --   --     < > = values in this interval not displayed.     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      His lymphoma clinically remains in remission.  Blood counts WNL. There is nothing to suggest lymphoma recurrence or complications.  We will continue to monitor.       He has no active ID issues.  He is up to date pneumococcal vaccines.  Previously recommended the Shingrix series and he declined.  We reviewed the current COVID-19 vaccine guidelines.  Pt reports that he received an updated COVID-19 vaccine and flu shot locally this season.       He will continue to work with [facility] for his health maintenance and other medical issues including hypertension.        Plan to follow-up in about a year.  I reminded him to contact us in the interim if questions, concerns or new issues arise.     Total of 30 minutes on patient visit, reviewing records, interpreting test results, placing orders, and documentation on the day of service.    Pt was seen and discussed with Dr. Concepción Whalen MD   Hematology/Oncology Fellow PGY6  Pager: [phone]      ATTENDING ATTESTATION:  The patient was seen and evaluated by me.  I have reviewed the vital signs, medications, labs, and imaging results independently, and have discussed the patient and plan with the resident/fellow, and agree with the findings and plan outlined in this note.  The impression and plan were jointly made.    REASON FOR VISIT:  Management of splenic marginal zone lymphoma.       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  To summarize his course, he was noted to have progressive leukocytosis on routine blood work in mid 2013.  PET/CT revealed splenomegaly and lymphadenopathy in the axilla, pretracheal area, and portacaval area.  Bone marrow biopsy on 12/12/2013 was diagnostic for splenic marginal zone lymphoma  (SMZL) with +3, +8 and +18 in a dominant clone, as well as a second clone that also contained loss of 21.  He was initially monitored without treatment.  After developing abdominal pain with massive splenomegaly, weight loss, and night sweats he was started on rituximab in 7/2014.  He had difficulty tolerating rituximab due to severe infusion reaction, so he was treated with CVP alone and then rituximab was successfully added with later cycles.  Otherwise, he tolerated treatment very well and completed 6 cycles at the end of 10/2014.  Video visit for management of lymphoma.    His wife, Trevor, was not with him today.  Since his last visit he has been feeling well.  Energy level and appetite are good.  No abdominal pain and normal bowel function.  No bleeding or unusual bruising.  No lumps or bumps.  He continues to be physically active and working steadily with [employer].  Still considering prison - not sure when.     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      His lymphoma clinically remains in remission.  Blood counts look great.  There is nothing to suggest lymphoma recurrence or complications.  We will continue to monitor.       He has no active ID issues.  He is up to date pneumococcal vaccines.  I have recommended the Shingrix series, but he declined.  He is up to date for the current flu shot and COVID-19 vaccine (got them locally).     He will continue to work with [facility] for his health maintenance and other medical issues including hypertension.        We will request labs and a visit in about 1 year.  I reminded him to contact us in the interim if questions, concerns or new issues arise.     The longitudinal plan of care for the diagnosis(es)/condition(s) as documented were addressed during this visit. Due to the added complexity in care, I will continue to support CORBIN in the subsequent management and with ongoing continuity of  care.    Carlos Beebe MD, PhD  Attending Physician, [facility]  [phone] [facility]

## 2024-10-30 ENCOUNTER — PATIENT OUTREACH (OUTPATIENT)
Dept: ONCOLOGY | Facility: CLINIC | Age: 74
End: 2024-10-30

## 2024-10-30 ENCOUNTER — APPOINTMENT (OUTPATIENT)
Dept: LAB | Facility: CLINIC | Age: 74
End: 2024-10-30
Attending: INTERNAL MEDICINE
Payer: COMMERCIAL

## 2024-10-30 ENCOUNTER — ONCOLOGY VISIT (OUTPATIENT)
Dept: ONCOLOGY | Facility: CLINIC | Age: 74
End: 2024-10-30
Attending: INTERNAL MEDICINE
Payer: COMMERCIAL

## 2024-10-30 VITALS
OXYGEN SATURATION: 95 % | BODY MASS INDEX: 30.34 KG/M2 | TEMPERATURE: 98.4 F | SYSTOLIC BLOOD PRESSURE: 136 MMHG | HEART RATE: 86 BPM | DIASTOLIC BLOOD PRESSURE: 90 MMHG | WEIGHT: 217.9 LBS | RESPIRATION RATE: 18 BRPM

## 2024-10-30 DIAGNOSIS — C83.07 SPLENIC MARGINAL ZONE B-CELL LYMPHOMA (H): ICD-10-CM

## 2024-10-30 LAB
ALBUMIN SERPL BCG-MCNC: 3.8 G/DL (ref 3.5–5.2)
ALP SERPL-CCNC: 82 U/L (ref 40–150)
ALT SERPL W P-5'-P-CCNC: 15 U/L (ref 0–70)
ANION GAP SERPL CALCULATED.3IONS-SCNC: 9 MMOL/L (ref 7–15)
AST SERPL W P-5'-P-CCNC: 15 U/L (ref 0–45)
BASOPHILS # BLD AUTO: 0.1 10E3/UL (ref 0–0.2)
BASOPHILS NFR BLD AUTO: 1 %
BILIRUB SERPL-MCNC: 0.6 MG/DL
BUN SERPL-MCNC: 10.9 MG/DL (ref 8–23)
CALCIUM SERPL-MCNC: 8.7 MG/DL (ref 8.8–10.4)
CHLORIDE SERPL-SCNC: 104 MMOL/L (ref 98–107)
CREAT SERPL-MCNC: 1.19 MG/DL (ref 0.67–1.17)
EGFRCR SERPLBLD CKD-EPI 2021: 64 ML/MIN/1.73M2
EOSINOPHIL # BLD AUTO: 0.1 10E3/UL (ref 0–0.7)
EOSINOPHIL NFR BLD AUTO: 2 %
ERYTHROCYTE [DISTWIDTH] IN BLOOD BY AUTOMATED COUNT: 12.9 % (ref 10–15)
GLUCOSE SERPL-MCNC: 115 MG/DL (ref 70–99)
HCO3 SERPL-SCNC: 24 MMOL/L (ref 22–29)
HCT VFR BLD AUTO: 43.7 % (ref 40–53)
HGB BLD-MCNC: 14.7 G/DL (ref 13.3–17.7)
IMM GRANULOCYTES # BLD: 0 10E3/UL
IMM GRANULOCYTES NFR BLD: 0 %
LDH SERPL L TO P-CCNC: 189 U/L (ref 0–250)
LYMPHOCYTES # BLD AUTO: 1 10E3/UL (ref 0.8–5.3)
LYMPHOCYTES NFR BLD AUTO: 13 %
MCH RBC QN AUTO: 33.9 PG (ref 26.5–33)
MCHC RBC AUTO-ENTMCNC: 33.6 G/DL (ref 31.5–36.5)
MCV RBC AUTO: 101 FL (ref 78–100)
MONOCYTES # BLD AUTO: 0.7 10E3/UL (ref 0–1.3)
MONOCYTES NFR BLD AUTO: 10 %
NEUTROPHILS # BLD AUTO: 5.4 10E3/UL (ref 1.6–8.3)
NEUTROPHILS NFR BLD AUTO: 74 %
NRBC # BLD AUTO: 0 10E3/UL
NRBC BLD AUTO-RTO: 0 /100
PLATELET # BLD AUTO: 275 10E3/UL (ref 150–450)
POTASSIUM SERPL-SCNC: 4.5 MMOL/L (ref 3.4–5.3)
PROT SERPL-MCNC: 7 G/DL (ref 6.4–8.3)
RBC # BLD AUTO: 4.34 10E6/UL (ref 4.4–5.9)
SODIUM SERPL-SCNC: 137 MMOL/L (ref 135–145)
WBC # BLD AUTO: 7.3 10E3/UL (ref 4–11)

## 2024-10-30 PROCEDURE — 36415 COLL VENOUS BLD VENIPUNCTURE: CPT | Performed by: INTERNAL MEDICINE

## 2024-10-30 PROCEDURE — 83615 LACTATE (LD) (LDH) ENZYME: CPT | Performed by: INTERNAL MEDICINE

## 2024-10-30 PROCEDURE — 85014 HEMATOCRIT: CPT | Performed by: INTERNAL MEDICINE

## 2024-10-30 PROCEDURE — 99214 OFFICE O/P EST MOD 30 MIN: CPT | Mod: GC | Performed by: INTERNAL MEDICINE

## 2024-10-30 PROCEDURE — 82947 ASSAY GLUCOSE BLOOD QUANT: CPT | Performed by: INTERNAL MEDICINE

## 2024-10-30 PROCEDURE — 85004 AUTOMATED DIFF WBC COUNT: CPT | Performed by: INTERNAL MEDICINE

## 2024-10-30 PROCEDURE — G2211 COMPLEX E/M VISIT ADD ON: HCPCS | Performed by: INTERNAL MEDICINE

## 2024-10-30 PROCEDURE — 99213 OFFICE O/P EST LOW 20 MIN: CPT | Performed by: INTERNAL MEDICINE

## 2024-10-30 RX ORDER — LISINOPRIL 10 MG/1
1 TABLET ORAL
COMMUNITY
Start: 2024-07-19

## 2024-10-30 ASSESSMENT — PAIN SCALES - GENERAL: PAINLEVEL_OUTOF10: NO PAIN (0)

## 2024-10-30 NOTE — LETTER
10/30/2024      Moose Serrano  [address] [place] [address]      Dear Colleague,    Thank you for referring your patient, Moose Serrano, to the [facility]. Please see a copy of my visit note below.    REASON FOR VISIT:  Management of splenic marginal zone lymphoma.       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  To summarize his course, he was noted to have progressive leukocytosis on routine blood work in mid 2013.  PET/CT revealed splenomegaly and lymphadenopathy in the axilla, pretracheal area, and portacaval area.  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  He was initially monitored without treatment.  After developing abdominal pain with massive splenomegaly, weight loss, and night sweats he was started on rituximab in 7/2014.  He had difficulty tolerating rituximab due to severe infusion reaction, so he was treated with CVP alone and then rituximab was successfully added with later cycles.  Otherwise, he tolerated treatment very well and completed 6 cycles at the end of 10/2014.      Corbin presents by himself today. He reports having COVID followed by a stomach bug about 1 month ago. He had a cough and severe diarrhea at that time, but has been feeling well since then. 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 Energy level is good; he is eating and drinking well, weight stable. Denies fever, chills, N/V/D, SOB, chest pain, abdominal pain, unusual bleeding or bruising, new lumps/bumps/lymphadenopathy. He does continue to have night sweats a few times per week, but this has not changed in the past few years. Pt notes he was a little dehydrated today because he was fasting for bloodwork. He is working on garage renovations this winter.       MEDICATIONS:  Current Outpatient Medications   Medication Sig Dispense Refill     amLODIPine (NORVASC) 10 MG tablet Take 1 tablet (10 mg) by mouth daily 90 tablet 0     levothyroxine (SYNTHROID/LEVOTHROID) 112 MCG tablet Take 1 tablet (112 mcg) by mouth daily 90 tablet 0     lisinopril (ZESTRIL) 10 MG tablet Take 1 tablet by mouth daily at 2 pm.       multivitamin w/minerals (THERA-VIT-M) tablet Take 1 tablet by mouth daily       No current facility-administered medications for this visit.     PHYSICAL EXAMINATION:  BP (!) 136/90 (BP Location: Left arm, Patient Position: Sitting, Cuff Size: Adult Large)   Pulse 86   Temp 98.4  F (36.9  C) (Oral)   Resp 18   Wt 98.8 kg (217 lb 14.4 oz)   SpO2 95%   BMI 30.34 kg/m    Wt Readings from Last 5 Encounters:   10/30/24 98.8 kg (217 lb 14.4 oz)   09/27/23 90.4 kg (199 lb 4.8 oz)   09/28/22 101.6 kg (223 lb 14.4 oz)   04/18/22 96.2 kg (212 lb)   01/12/22 97.7 kg (215 lb 4.8 oz)     General: Well appearing. No distress.  HEENT: Sclerae anicteric. No OP lesions, masses, or tonsilar enlargement.  Lungs: Clear bilaterally without wheezing or crackles.  Heart: Regular rate and rhythm.  Gastrointestinal: Bowel sounds present, no tenderness to palpation, spleen tip palpable  Extremities: No lower extremity edema.  Lymph:  No cervical, clavicular, axillary, epitrochlear, or inguinal lymphadenopathy.  Skin: No rash on exposed skin   Performance status: ECOG 0    LABORATORY DATA:  Reviewed by me  Recent Labs   Lab Test 10/30/24  0751 09/27/23  0736 09/28/22  1222 01/12/22  0852 05/24/21  0629 11/17/20  0710 04/29/20  0809   WBC 7.3 5.2 6.4   < > 6.4 5.8 4.8   HGB 14.7 14.0 15.1   < > 14.6 15.6 14.3    241 227   < > 273 263 220   ANEU  --   --   --   --  4.4 3.6 2.9   ANEUTAUTO 5.4 3.2 4.4   < >  --   --   --    ALYM  --   --   --   --  1.1 1.2 1.1   ALYMPAUTO 1.0 1.1 1.1   < >  --   --   --     < > = values in this interval not displayed.     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      His lymphoma clinically remains in remission.  Blood counts WNL. There is nothing to suggest lymphoma recurrence or complications.  We will continue to monitor.       He has no active ID issues.  He is up to date pneumococcal vaccines.  Previously recommended the Shingrix series and he declined.  We reviewed the current COVID-19 vaccine guidelines.  Pt reports that he received an updated COVID-19 vaccine and flu shot locally this season.       He will continue to work with [facility] for his health maintenance and other medical issues including hypertension.        Plan to follow-up in about a year.  I reminded him to contact us in the interim if questions, concerns or new issues arise.     Total of 30 minutes on patient visit, reviewing records, interpreting test results, placing orders, and documentation on the day of service.    Pt was seen and discussed with Dr. Concepción Whalen MD   Hematology/Oncology Fellow PGY6  Pager: [phone]      ATTENDING ATTESTATION:  The patient was seen and evaluated by me.  I have reviewed the vital signs, medications, labs, and imaging results independently, and have discussed the patient and plan with the resident/fellow, and agree with the findings and plan outlined in this note.  The impression and plan were jointly made.    REASON FOR VISIT:  Management of splenic marginal zone lymphoma.       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  To summarize his course, he was noted  to have progressive leukocytosis on routine blood work in mid 2013.  PET/CT revealed splenomegaly and lymphadenopathy in the axilla, pretracheal area, and portacaval area.  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  He was initially monitored without treatment.  After developing abdominal pain with massive splenomegaly, weight loss, and night sweats he was started on rituximab in 7/2014.  He had difficulty tolerating rituximab due to severe infusion reaction, so he was treated with CVP alone and then rituximab was successfully added with later cycles.  Otherwise, he tolerated treatment very well and completed 6 cycles at the end of 10/2014.  Video visit for management of lymphoma.    His wife, Trevor, was not with him today.  Since his last visit he has been feeling well.  Energy level and appetite are good.  No abdominal pain and normal bowel function.  No bleeding or unusual bruising.  No lumps or bumps.  He continues to be physically active and working steadily with [employer].  Still considering custodial - not sure when.     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      His lymphoma clinically remains in remission.  Blood counts look great.  There is nothing to suggest lymphoma recurrence or complications.  We will continue to monitor.       He has no active ID issues.  He is up to date pneumococcal vaccines.  I have recommended the Shingrix series, but he declined.  He is up to date for the current flu shot and COVID-19 vaccine (got them locally).     He will continue to work with [facility] for his health maintenance and other medical issues including hypertension.        We will request labs and a visit in about 1 year.  I reminded him to contact us in the interim if questions, concerns or new issues  arise.     The longitudinal plan of care for the diagnosis(es)/condition(s) as documented were addressed during this visit. Due to the added complexity in care, I will continue to support ZOILA in the subsequent management and with ongoing continuity of care.    Carlos Beebe MD, PhD  Attending Physician, [facility]  [phone] clinic       Again, thank you for allowing me to participate in the care of your patient.        Sincerely,        Carlos Beebe MD

## 2024-10-30 NOTE — NURSING NOTE
Chief Complaint   Patient presents with    Blood Draw     Labs drawn via  by RN.      Labs drawn with  by RN. Vitals taken. Patient checked into next appointment.    Tiffanie Rehman RN

## 2024-10-30 NOTE — NURSING NOTE
"Oncology Rooming Note    October 30, 2024 8:46 AM   Moose Serrano is a 73 year old male who presents for:    Chief Complaint   Patient presents with    Blood Draw     Labs drawn via  by RN.     Oncology Clinic Visit     RTN Splenic marginal zone-b cell lymphoma      Initial Vitals: BP (!) 136/90 (BP Location: Left arm, Patient Position: Sitting, Cuff Size: Adult Large)   Pulse 86   Temp 98.4  F (36.9  C) (Oral)   Resp 18   Wt 98.8 kg (217 lb 14.4 oz)   SpO2 95%   BMI 30.34 kg/m   Estimated body mass index is 30.34 kg/m  as calculated from the following:    Height as of 9/27/23: 1.805 m (5' 11.06\").    Weight as of this encounter: 98.8 kg (217 lb 14.4 oz). Body surface area is 2.23 meters squared.  No Pain (0) Comment: Data Unavailable   No LMP for male patient.  Allergies reviewed: Yes  Medications reviewed: Yes    Medications: Medication refills not needed today.  Pharmacy name entered into EPIC:    [facility] [place] [address] [facility] - [place], [place] - [address] [id number] - [place], [place] - [address]  WRITTEN PRESCRIPTION REQUESTED    Frailty Screening:   Is the patient here for a new oncology consult visit in cancer care? 2. No      Clinical concerns: none      Alondra Major             "